# Patient Record
Sex: MALE | Race: WHITE | NOT HISPANIC OR LATINO
[De-identification: names, ages, dates, MRNs, and addresses within clinical notes are randomized per-mention and may not be internally consistent; named-entity substitution may affect disease eponyms.]

---

## 2017-09-20 ENCOUNTER — TRANSCRIPTION ENCOUNTER (OUTPATIENT)
Age: 57
End: 2017-09-20

## 2017-09-20 ENCOUNTER — INPATIENT (INPATIENT)
Facility: HOSPITAL | Age: 57
LOS: 0 days | Discharge: HOME | End: 2017-09-21
Attending: INTERNAL MEDICINE | Admitting: INTERNAL MEDICINE
Payer: MEDICARE

## 2017-09-20 VITALS
HEART RATE: 77 BPM | SYSTOLIC BLOOD PRESSURE: 158 MMHG | TEMPERATURE: 98 F | OXYGEN SATURATION: 98 % | DIASTOLIC BLOOD PRESSURE: 89 MMHG | RESPIRATION RATE: 17 BRPM

## 2017-09-20 DIAGNOSIS — Z95.1 PRESENCE OF AORTOCORONARY BYPASS GRAFT: Chronic | ICD-10-CM

## 2017-09-20 DIAGNOSIS — R94.39 ABNORMAL RESULT OF OTHER CARDIOVASCULAR FUNCTION STUDY: ICD-10-CM

## 2017-09-20 LAB
BUN SERPL-MCNC: 24 MG/DL — HIGH (ref 7–23)
CALCIUM SERPL-MCNC: 9.3 MG/DL — SIGNIFICANT CHANGE UP (ref 8.4–10.5)
CHLORIDE SERPL-SCNC: 97 MMOL/L — LOW (ref 98–107)
CO2 SERPL-SCNC: 23 MMOL/L — SIGNIFICANT CHANGE UP (ref 22–31)
CREAT SERPL-MCNC: 0.99 MG/DL — SIGNIFICANT CHANGE UP (ref 0.5–1.3)
GLUCOSE SERPL-MCNC: 475 MG/DL — CRITICAL HIGH (ref 70–99)
HBA1C BLD-MCNC: 6.8 % — HIGH (ref 4–5.6)
HCT VFR BLD CALC: 45.2 % — SIGNIFICANT CHANGE UP (ref 39–50)
HGB BLD-MCNC: 15.3 G/DL — SIGNIFICANT CHANGE UP (ref 13–17)
MCHC RBC-ENTMCNC: 30.3 PG — SIGNIFICANT CHANGE UP (ref 27–34)
MCHC RBC-ENTMCNC: 33.8 % — SIGNIFICANT CHANGE UP (ref 32–36)
MCV RBC AUTO: 89.5 FL — SIGNIFICANT CHANGE UP (ref 80–100)
NRBC # FLD: 0 — SIGNIFICANT CHANGE UP
PLATELET # BLD AUTO: 244 K/UL — SIGNIFICANT CHANGE UP (ref 150–400)
PMV BLD: 10.7 FL — SIGNIFICANT CHANGE UP (ref 7–13)
POTASSIUM SERPL-MCNC: 5.2 MMOL/L — SIGNIFICANT CHANGE UP (ref 3.5–5.3)
POTASSIUM SERPL-SCNC: 5.2 MMOL/L — SIGNIFICANT CHANGE UP (ref 3.5–5.3)
RBC # BLD: 5.05 M/UL — SIGNIFICANT CHANGE UP (ref 4.2–5.8)
RBC # FLD: 12.5 % — SIGNIFICANT CHANGE UP (ref 10.3–14.5)
SODIUM SERPL-SCNC: 136 MMOL/L — SIGNIFICANT CHANGE UP (ref 135–145)
WBC # BLD: 13.54 K/UL — HIGH (ref 3.8–10.5)
WBC # FLD AUTO: 13.54 K/UL — HIGH (ref 3.8–10.5)

## 2017-09-20 PROCEDURE — 93010 ELECTROCARDIOGRAM REPORT: CPT | Mod: 76

## 2017-09-20 RX ORDER — CLOPIDOGREL BISULFATE 75 MG/1
75 TABLET, FILM COATED ORAL DAILY
Qty: 0 | Refills: 0 | Status: DISCONTINUED | OUTPATIENT
Start: 2017-09-21 | End: 2017-09-21

## 2017-09-20 RX ORDER — CLONAZEPAM 1 MG
0.5 TABLET ORAL DAILY
Qty: 0 | Refills: 0 | Status: DISCONTINUED | OUTPATIENT
Start: 2017-09-20 | End: 2017-09-21

## 2017-09-20 RX ORDER — DEXTROSE 50 % IN WATER 50 %
25 SYRINGE (ML) INTRAVENOUS ONCE
Qty: 0 | Refills: 0 | Status: DISCONTINUED | OUTPATIENT
Start: 2017-09-20 | End: 2017-09-21

## 2017-09-20 RX ORDER — INSULIN LISPRO 100/ML
12 VIAL (ML) SUBCUTANEOUS ONCE
Qty: 0 | Refills: 0 | Status: COMPLETED | OUTPATIENT
Start: 2017-09-20 | End: 2017-09-20

## 2017-09-20 RX ORDER — LISINOPRIL 2.5 MG/1
40 TABLET ORAL
Qty: 0 | Refills: 0 | Status: DISCONTINUED | OUTPATIENT
Start: 2017-09-20 | End: 2017-09-21

## 2017-09-20 RX ORDER — SODIUM CHLORIDE 9 MG/ML
3 INJECTION INTRAMUSCULAR; INTRAVENOUS; SUBCUTANEOUS EVERY 8 HOURS
Qty: 0 | Refills: 0 | Status: DISCONTINUED | OUTPATIENT
Start: 2017-09-20 | End: 2017-09-21

## 2017-09-20 RX ORDER — ATORVASTATIN CALCIUM 80 MG/1
10 TABLET, FILM COATED ORAL AT BEDTIME
Qty: 0 | Refills: 0 | Status: DISCONTINUED | OUTPATIENT
Start: 2017-09-20 | End: 2017-09-21

## 2017-09-20 RX ORDER — INSULIN GLARGINE 100 [IU]/ML
17 INJECTION, SOLUTION SUBCUTANEOUS
Qty: 0 | Refills: 0 | Status: DISCONTINUED | OUTPATIENT
Start: 2017-09-20 | End: 2017-09-21

## 2017-09-20 RX ORDER — INSULIN LISPRO 100/ML
6 VIAL (ML) SUBCUTANEOUS ONCE
Qty: 0 | Refills: 0 | Status: COMPLETED | OUTPATIENT
Start: 2017-09-20 | End: 2017-09-20

## 2017-09-20 RX ORDER — CITALOPRAM 10 MG/1
10 TABLET, FILM COATED ORAL DAILY
Qty: 0 | Refills: 0 | Status: DISCONTINUED | OUTPATIENT
Start: 2017-09-20 | End: 2017-09-21

## 2017-09-20 RX ORDER — INSULIN LISPRO 100/ML
VIAL (ML) SUBCUTANEOUS
Qty: 0 | Refills: 0 | Status: DISCONTINUED | OUTPATIENT
Start: 2017-09-20 | End: 2017-09-21

## 2017-09-20 RX ORDER — GLUCAGON INJECTION, SOLUTION 0.5 MG/.1ML
1 INJECTION, SOLUTION SUBCUTANEOUS ONCE
Qty: 0 | Refills: 0 | Status: DISCONTINUED | OUTPATIENT
Start: 2017-09-20 | End: 2017-09-21

## 2017-09-20 RX ORDER — DEXTROSE 50 % IN WATER 50 %
12.5 SYRINGE (ML) INTRAVENOUS ONCE
Qty: 0 | Refills: 0 | Status: DISCONTINUED | OUTPATIENT
Start: 2017-09-20 | End: 2017-09-21

## 2017-09-20 RX ORDER — METOPROLOL TARTRATE 50 MG
100 TABLET ORAL DAILY
Qty: 0 | Refills: 0 | Status: DISCONTINUED | OUTPATIENT
Start: 2017-09-20 | End: 2017-09-21

## 2017-09-20 RX ORDER — INSULIN GLARGINE 100 [IU]/ML
19 INJECTION, SOLUTION SUBCUTANEOUS AT BEDTIME
Qty: 0 | Refills: 0 | Status: DISCONTINUED | OUTPATIENT
Start: 2017-09-20 | End: 2017-09-21

## 2017-09-20 RX ORDER — ASPIRIN/CALCIUM CARB/MAGNESIUM 324 MG
81 TABLET ORAL ONCE
Qty: 0 | Refills: 0 | Status: COMPLETED | OUTPATIENT
Start: 2017-09-20 | End: 2017-09-20

## 2017-09-20 RX ORDER — DEXTROSE 50 % IN WATER 50 %
1 SYRINGE (ML) INTRAVENOUS ONCE
Qty: 0 | Refills: 0 | Status: DISCONTINUED | OUTPATIENT
Start: 2017-09-20 | End: 2017-09-21

## 2017-09-20 RX ORDER — SODIUM CHLORIDE 9 MG/ML
1000 INJECTION, SOLUTION INTRAVENOUS
Qty: 0 | Refills: 0 | Status: DISCONTINUED | OUTPATIENT
Start: 2017-09-20 | End: 2017-09-21

## 2017-09-20 RX ORDER — NIFEDIPINE 30 MG
30 TABLET, EXTENDED RELEASE 24 HR ORAL DAILY
Qty: 0 | Refills: 0 | Status: DISCONTINUED | OUTPATIENT
Start: 2017-09-20 | End: 2017-09-21

## 2017-09-20 RX ORDER — INSULIN LISPRO 100/ML
5 VIAL (ML) SUBCUTANEOUS
Qty: 0 | Refills: 0 | Status: DISCONTINUED | OUTPATIENT
Start: 2017-09-20 | End: 2017-09-21

## 2017-09-20 RX ORDER — ASPIRIN/CALCIUM CARB/MAGNESIUM 324 MG
81 TABLET ORAL DAILY
Qty: 0 | Refills: 0 | Status: DISCONTINUED | OUTPATIENT
Start: 2017-09-20 | End: 2017-09-21

## 2017-09-20 RX ORDER — SODIUM CHLORIDE 9 MG/ML
500 INJECTION INTRAMUSCULAR; INTRAVENOUS; SUBCUTANEOUS
Qty: 0 | Refills: 0 | Status: COMPLETED | OUTPATIENT
Start: 2017-09-20 | End: 2017-09-20

## 2017-09-20 RX ADMIN — Medication 5 UNIT(S): at 17:49

## 2017-09-20 RX ADMIN — CITALOPRAM 10 MILLIGRAM(S): 10 TABLET, FILM COATED ORAL at 14:12

## 2017-09-20 RX ADMIN — LISINOPRIL 40 MILLIGRAM(S): 2.5 TABLET ORAL at 18:38

## 2017-09-20 RX ADMIN — SODIUM CHLORIDE 75 MILLILITER(S): 9 INJECTION INTRAMUSCULAR; INTRAVENOUS; SUBCUTANEOUS at 15:38

## 2017-09-20 RX ADMIN — SODIUM CHLORIDE 3 MILLILITER(S): 9 INJECTION INTRAMUSCULAR; INTRAVENOUS; SUBCUTANEOUS at 17:12

## 2017-09-20 RX ADMIN — INSULIN GLARGINE 19 UNIT(S): 100 INJECTION, SOLUTION SUBCUTANEOUS at 22:37

## 2017-09-20 RX ADMIN — Medication 81 MILLIGRAM(S): at 08:46

## 2017-09-20 RX ADMIN — Medication 4: at 17:49

## 2017-09-20 RX ADMIN — ATORVASTATIN CALCIUM 10 MILLIGRAM(S): 80 TABLET, FILM COATED ORAL at 22:37

## 2017-09-20 RX ADMIN — Medication 0.5 MILLIGRAM(S): at 14:11

## 2017-09-20 RX ADMIN — Medication 12 UNIT(S): at 08:46

## 2017-09-20 RX ADMIN — SODIUM CHLORIDE 3 MILLILITER(S): 9 INJECTION INTRAMUSCULAR; INTRAVENOUS; SUBCUTANEOUS at 22:38

## 2017-09-20 NOTE — DISCHARGE NOTE ADULT - CARE PLAN
Principal Discharge DX:	CAD (coronary artery disease)  Goal:	symptom resolution s/p cardiac catheterization with stent  Instructions for follow-up, activity and diet:	Low fat, Low cholesterol diet.  Take medications as prescribed. Follow up with PMD within 1 week of discharge.  Secondary Diagnosis:	DM (diabetes mellitus)  Goal:	Blood sugar control  Instructions for follow-up, activity and diet:	Monitor glucose levels daily with fingersticks, continue DM meds as prescribed, ADA diet, Follow up with PMD within 1 week of discharge.  Secondary Diagnosis:	HTN (hypertension)  Goal:	Blood pressure control  Instructions for follow-up, activity and diet:	Low salt, low fat diet.  Take blood pressure medications as prescribed.  Please follow up with your PMD in 1-2 weeks for further medical management  Secondary Diagnosis:	Hyperlipidemia  Goal:	maintain normal cholesterol levels  Instructions for follow-up, activity and diet:	Low fat, Low cholesterol diet.  Take medications as prescribed. Follow up with PMD within 1 week of discharge. Principal Discharge DX:	CAD (coronary artery disease)  Goal:	symptom resolution s/p cardiac catheterization with stent  Instructions for follow-up, activity and diet:	Low fat, Low cholesterol diet.  Take medications as prescribed. Follow up with PMD within 1 week of discharge. Follow up with Dr Price, please call an make an appointment  Secondary Diagnosis:	DM (diabetes mellitus)  Goal:	Blood sugar control  Instructions for follow-up, activity and diet:	Monitor glucose levels daily with fingersticks, continue diabetes medications as prescribed, ADA diet, Follow up with PMD within 1 week of discharge.  Secondary Diagnosis:	HTN (hypertension)  Goal:	Blood pressure control  Instructions for follow-up, activity and diet:	Low salt, low fat diet.  Take blood pressure medications as prescribed.  Please follow up with your PMD in 1-2 weeks for further medical management  Secondary Diagnosis:	Hyperlipidemia  Goal:	maintain normal cholesterol levels  Instructions for follow-up, activity and diet:	Low fat, Low cholesterol diet.  Take medications as prescribed. Follow up with PMD within 1 week of discharge. Principal Discharge DX:	CAD (coronary artery disease)  Goal:	symptom resolution s/p cardiac catheterization with stent  Instructions for follow-up, activity and diet:	Low fat, Low cholesterol diet. You need to take your medications as prescribed every day to prevent the stents in your coronary arteries from becoming blocked (Aspirin and Plavix). Please follow up with your cardiologist within 1 week.  Secondary Diagnosis:	DM (diabetes mellitus)  Goal:	Blood sugar control  Instructions for follow-up, activity and diet:	Monitor glucose levels daily with fingersticks, continue insulin regimen and oral medications as prescribed, to keep A1C <6. ADA diet, Follow up with PMD within 1 week of discharge.  Secondary Diagnosis:	HTN (hypertension)  Goal:	Blood pressure control, keep pressure <140/90  Instructions for follow-up, activity and diet:	Low salt, low fat diet.  Take blood pressure medications as prescribed.  Please follow up with your PMD in 1-2 weeks for further medical management  Secondary Diagnosis:	Hyperlipidemia  Goal:	maintain normal cholesterol levels  Instructions for follow-up, activity and diet:	Low fat, Low cholesterol diet. Take pravastatin as prescribed. Follow up with PMD within 1 week of discharge.

## 2017-09-20 NOTE — DISCHARGE NOTE ADULT - PATIENT PORTAL LINK FT
“You can access the Follow[facility] Patient Portal, offered by [facility], by registering with the following website: [email or web address]”

## 2017-09-20 NOTE — DISCHARGE NOTE ADULT - CARE PROVIDER_API CALL
Nicolas Price), Cardiovascular Disease; Internal Medicine; Nuclear Cardiology  [address] [place] [address]  Phone: [phone]  Fax: [phone]

## 2017-09-20 NOTE — H&P CARDIOLOGY - PSH
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 ago    History of Tonsillectomy    S/P CABG x 3  2014 with Dr Oshea

## 2017-09-20 NOTE — DISCHARGE NOTE ADULT - PLAN OF CARE
symptom resolution s/p cardiac catheterization with stent Low fat, Low cholesterol diet.  Take medications as prescribed. Follow up with PMD within 1 week of discharge. Blood sugar control Monitor glucose levels daily with fingersticks, continue DM meds as prescribed, ADA diet, Follow up with PMD within 1 week of discharge. Blood pressure control Low salt, low fat diet.  Take blood pressure medications as prescribed.  Please follow up with your PMD in 1-2 weeks for further medical management maintain normal cholesterol levels Low fat, Low cholesterol diet.  Take medications as prescribed. Follow up with PMD within 1 week of discharge. Follow up with Dr Price, please call an make an appointment Monitor glucose levels daily with fingersticks, continue diabetes medications as prescribed, ADA diet, Follow up with PMD within 1 week of discharge. Low fat, Low cholesterol diet. You need to take your medications as prescribed every day to prevent the stents in your coronary arteries from becoming blocked (Aspirin and Plavix). Please follow up with your cardiologist within 1 week. Monitor glucose levels daily with fingersticks, continue insulin regimen and oral medications as prescribed, to keep A1C <6. ADA diet, Follow up with PMD within 1 week of discharge. Blood pressure control, keep pressure <140/90 Low fat, Low cholesterol diet. Take pravastatin as prescribed. Follow up with PMD within 1 week of discharge.

## 2017-09-20 NOTE — H&P CARDIOLOGY - ATTENDING COMMENTS
pt seen and examined agree with plan above     1) CAD s/p CABG s/p PCI - s/p PCI of left mid LCX, ASHLEE, no complications, cont asa and plavix, d/c home tomorrow, all three grafts patent     2) DM2 - sugars elevated sec to prednisone, cont sliding scale    3) HTN -resume home meds

## 2017-09-20 NOTE — H&P CARDIOLOGY - FAMILY HISTORY
Father  Still living? No  Family history of acute myocardial infarction, Age at diagnosis: Age Unknown

## 2017-09-20 NOTE — DISCHARGE NOTE ADULT - ADDITIONAL INSTRUCTIONS
No heavy lifting greater than 5-10 pounds with wrist  procedure was performed on for one week after procedure. No strenuous activity x 3 weeks after procedure. Monitor site of procedure for any redness, swelling, discharge, if any of this occurs follow-up with PMD/Cardiologist or return to hospital immediately. No heavy lifting x one week after procedure. No strenuous activity x 3 weeks after procedure. No driving x 24 hours. You may shower but no baths or swimming x one week post procedure.  Monitor site of procedure for any redness, swelling, discharge, if any of this occurs follow-up with PMD/Cardiologist or return to hospital immediately.

## 2017-09-20 NOTE — DISCHARGE NOTE ADULT - MEDICATION SUMMARY - MEDICATIONS TO TAKE
I will START or STAY ON the medications listed below when I get home from the hospital:    Aspir 81 oral delayed release tablet  -- 1 tab(s) by mouth once a day  -- Indication: For CAD (coronary artery disease)    lisinopril 40 mg oral tablet  -- 1 tab(s) by mouth 2 times a day  -- Indication: For HTN (hypertension)    clonazePAM 0.5 mg oral tablet  -- 1 tab(s) by mouth once a day  -- Indication: For Anxiety    citalopram 10 mg oral tablet  -- 1 tab(s) by mouth once a day  -- Indication: For Anxiety    metFORMIN 500 mg oral tablet, extended release  -- 1 tab(s) by mouth once a day  -- Indication: For DM (diabetes mellitus)    HumaLOG 100 units/mL subcutaneous solution  -- 5 unit(s) subcutaneous 3 times a day (with meals) and sliding scale  -- Indication: For DM (diabetes mellitus)    Lantus 100 units/mL subcutaneous solution  -- 17 unit(s) subcutaneous once a day (in the morning)  -- Indication: For DM (diabetes mellitus)    Lantus 100 units/mL subcutaneous solution  -- 19 unit(s) subcutaneous once a day (at bedtime)  -- Indication: For DM (diabetes mellitus)    pravastatin 40 mg oral tablet  -- 1 tab(s) by mouth once a day  -- Indication: For Hyperlipidemia    clopidogrel 75 mg oral tablet  -- 1 tab(s) by mouth once a day  -- Indication: For CAD (coronary artery disease)    metoprolol succinate 100 mg oral tablet, extended release  -- 1 tab(s) by mouth once a day  -- Indication: For CAD (coronary artery disease)    Nifedical XL 30 mg oral tablet, extended release  -- 1 tab(s) by mouth once a day  -- Indication: For HTN (hypertension)    Multiple Vitamins oral capsule  -- 1 cap(s) by mouth once a day  -- Indication: For Nutirion    Vitamin D3  -- orally 2 times a week  -- Indication: For Nutrition    Vitamin C 250 mg oral tablet  -- 1 tab(s) by mouth once a day  -- Indication: For Nutrition

## 2017-09-20 NOTE — DISCHARGE NOTE ADULT - INSTRUCTIONS
Low cholesterol diet. Salt restriction. 1800Kcal diabetic diet with carb restriction. Take medication as prescribed, don't miss medication dose unless instructed by your provider. If you have an onset of chest pains, shortness of breath, dizziness, palpitations or other cardiac/respiratory symptoms please contact your provider or dial 911.

## 2017-09-20 NOTE — H&P CARDIOLOGY - COMMENTS
on arrival likely steroid induced hyperglycemia in the setting of DM-II  on arrival likely steroid induced hyperglycemia in the setting of DM-II as he took prednisone 50mg po [id number] for known idoine allergy (diabetes well controlled HgbA1c 6.8), will give Humalog 12units subcutaneously x1 and monitor FS closely   Will give ASA 81mg po x1 as patient did not take his morning dose today  patient advised of carotid bruit, admits to carotid duplex with PMD recently

## 2017-09-20 NOTE — H&P CARDIOLOGY - HISTORY OF PRESENT ILLNESS
57 year old male with HTN, hyperlipidemia, DM-II, known CAD with 3V CABG 2014 who presented to his Cardiologist for a routine follow up . Admits to increase in fatigue. Underwent a Nuclear Stress test   Admits to heaviness in his legs when walking, relieved with rest. Denies chest pain, dizziness, syncope, edema, orthopnea, PND, decrease in exercise tolerance.   In light of patients CAD, symptoms and abnormal noninvasive test findings there is high suspicion for CAD progression. Patient is now referred to [facility] for a cardiac catheterization with possible PTCA/stent. 57 year old male with HTN, hyperlipidemia, DM-II, known CAD with 3V CABG 2014 who presented to his Cardiologist for a routine follow up . Admits to increase in fatigue. Admits to heaviness in his legs when walking, relieved with rest. Denies chest pain, dizziness, syncope, edema, orthopnea, PND, decrease in exercise tolerance. Underwent a Nuclear Stress test 8/31/17 revealing EF 42%, moderate LV dilatation. , distal anteroseptal wall, apex and inferoapical region with mild jonn-infarct ischemia.   In light of patients CAD, symptoms and abnormal noninvasive test findings there is high suspicion for CAD progression. Patient is now referred to [facility] for a cardiac catheterization with possible PTCA/stent.

## 2017-09-20 NOTE — PATIENT PROFILE ADULT. - VISION (WITH CORRECTIVE LENSES IF THE PATIENT USUALLY WEARS THEM):
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

## 2017-09-20 NOTE — H&P CARDIOLOGY - PMH
Anxiety    BPH (benign prostatic hyperplasia)    CAD (coronary artery disease)    Coronary Atherosclerosis of Native Coronary Artery    DM (Diabetes Mellitus)    HTN (Hypertension)    Hyperlipidemia    Smoker

## 2017-09-20 NOTE — CHART NOTE - NSCHARTNOTEFT_GEN_A_CORE
Pt returned to floor s/p R angio. Right femoral site clean and dry, femoral pulse 2+, no evidence of active bleeding. DP and PT pulses intact.

## 2017-09-20 NOTE — DISCHARGE NOTE ADULT - HOSPITAL COURSE
57 year old male with HTN, hyperlipidemia, DM-II, known CAD with 3V CABG 2014 who presented to his Cardiologist for a routine follow up . Admits to increase in fatigue. Admits to heaviness in his legs when walking, relieved with rest. Denies chest pain, dizziness, syncope, edema, orthopnea, PND, decrease in exercise tolerance. Underwent a Nuclear Stress test 8/31/17 revealing EF 42%, moderate LV dilatation. , distal anteroseptal wall, apex and inferoapical region with mild jonn-infarct ischemia. In light of patients CAD, symptoms and abnormal noninvasive test findings there is high suspicion for CAD progression. Patient is now referred to [facility] for a cardiac catheterization with possible PTCA/stent.     On 9/20 patient underwent a cardiac catheterization via right radial access that revealed 90 % lesion in the mid circumflex, a Resolute Jose drug-eluting stent was placed. 57 year old male with HTN, hyperlipidemia, DM-II, known CAD with 3V CABG 2014 who presented to his Cardiologist for a routine follow up . Admits to increase in fatigue. Admits to heaviness in his legs when walking, relieved with rest. Denies chest pain, dizziness, syncope, edema, orthopnea, PND, decrease in exercise tolerance. Underwent a Nuclear Stress test 8/31/17 revealing EF 42%, moderate LV dilatation. , distal anteroseptal wall, apex and inferoapical region with mild jonn-infarct ischemia. In light of patients CAD, symptoms and abnormal noninvasive test findings there is high suspicion for CAD progression. Patient is now referred to [facility] for a cardiac catheterization with possible PTCA/stent.     On 9/20 patient underwent a cardiac catheterization via right femoral access that revealed 90 % lesion in the mid circumflex, a Resolute Jose drug-eluting stent was placed. 57 year old male with HTN, hyperlipidemia, DM-II, known CAD with 3V CABG 2014 who presented to his Cardiologist for a routine follow up. Admits to increase in fatigue. Admits to heaviness in his legs when walking, relieved with rest. Denies chest pain, dizziness, syncope, edema, orthopnea, PND, decrease in exercise tolerance. Underwent a Nuclear Stress test 8/31/17 revealing EF 42%, moderate LV dilatation. , distal anteroseptal wall, apex and inferoapical region with mild jonn-infarct ischemia. In light of patients CAD, symptoms and abnormal noninvasive test findings there is high suspicion for CAD progression. Patient is now referred to [facility] for a cardiac catheterization with possible PTCA/stent.     On 9/20 patient underwent a cardiac catheterization via right femoral access that revealed 90 % lesion in the mid circumflex, a Resolute Jose drug-eluting stent was placed. FS on admission was 423 likely secondary to prednisone which was given for iodine allergy. A16 is 6.8. 57 year old male with HTN, hyperlipidemia, DM-II, known CAD with 3V CABG 2014 who presented to his Cardiologist for a routine follow up. Admits to increase in fatigue. Admits to heaviness in his legs when walking, relieved with rest. Denies chest pain, dizziness, syncope, edema, orthopnea, PND, decrease in exercise tolerance. Underwent a Nuclear Stress test 8/31/17 revealing EF 42%, moderate LV dilatation. , distal anteroseptal wall, apex and inferoapical region with mild jonn-infarct ischemia. In light of patients CAD, symptoms and abnormal noninvasive test findings there is high suspicion for CAD progression. Patient is now referred to [facility] for a cardiac catheterization with possible PTCA/stent.     On 9/20 patient underwent a cardiac catheterization via right femoral access that revealed 90 % lesion in the mid circumflex, a Resolute Jose drug-eluting stent was placed. FS on admission was 423 likely secondary to prednisone which was given for iodine allergy. A16 is 6.8. Pt to continue with ASA and plavix. Case discussed with Albert Davis, pt is cleared for discharge with outpatient follow up. 57 year old male with HTN, hyperlipidemia, DM-II, known CAD with 3V CABG 2014 who presented to his Cardiologist for a routine follow up. Admits to increase in fatigue. Admits to heaviness in his legs when walking, relieved with rest. Denies chest pain, dizziness, syncope, edema, orthopnea, PND, decrease in exercise tolerance. Underwent a Nuclear Stress test 8/31/17 revealing EF 42%, moderate LV dilatation. , distal anteroseptal wall, apex and inferoapical region with mild jonn-infarct ischemia. In light of patients CAD, symptoms and abnormal noninvasive test findings there is high suspicion for CAD progression. Patient is now referred to [facility] for a cardiac catheterization with possible PTCA/stent.     On 9/20 patient underwent a cardiac catheterization via right femoral access that revealed 90 % lesion in the mid circumflex, a Resolute Jose drug-eluting stent was placed. FS on admission was 423 likely secondary to prednisone which was given for iodine allergy. A16 is 6.8. Pt to continue with ASA and plavix. Labs and vitals reviewed and discussed with Albert Davis, pt is cleared for discharge with outpatient follow up.

## 2017-09-21 VITALS
OXYGEN SATURATION: 98 % | SYSTOLIC BLOOD PRESSURE: 127 MMHG | RESPIRATION RATE: 18 BRPM | DIASTOLIC BLOOD PRESSURE: 63 MMHG | TEMPERATURE: 98 F | HEART RATE: 63 BPM

## 2017-09-21 LAB
BUN SERPL-MCNC: 25 MG/DL — HIGH (ref 7–23)
CALCIUM SERPL-MCNC: 8.8 MG/DL — SIGNIFICANT CHANGE UP (ref 8.4–10.5)
CHLORIDE SERPL-SCNC: 100 MMOL/L — SIGNIFICANT CHANGE UP (ref 98–107)
CO2 SERPL-SCNC: 21 MMOL/L — LOW (ref 22–31)
CREAT SERPL-MCNC: 0.92 MG/DL — SIGNIFICANT CHANGE UP (ref 0.5–1.3)
GLUCOSE SERPL-MCNC: 340 MG/DL — HIGH (ref 70–99)
HCT VFR BLD CALC: 43.3 % — SIGNIFICANT CHANGE UP (ref 39–50)
HGB BLD-MCNC: 15.1 G/DL — SIGNIFICANT CHANGE UP (ref 13–17)
MCHC RBC-ENTMCNC: 31.3 PG — SIGNIFICANT CHANGE UP (ref 27–34)
MCHC RBC-ENTMCNC: 34.9 % — SIGNIFICANT CHANGE UP (ref 32–36)
MCV RBC AUTO: 89.8 FL — SIGNIFICANT CHANGE UP (ref 80–100)
NRBC # FLD: 0 — SIGNIFICANT CHANGE UP
PLATELET # BLD AUTO: 258 K/UL — SIGNIFICANT CHANGE UP (ref 150–400)
PMV BLD: 10.7 FL — SIGNIFICANT CHANGE UP (ref 7–13)
POTASSIUM SERPL-MCNC: 4.4 MMOL/L — SIGNIFICANT CHANGE UP (ref 3.5–5.3)
POTASSIUM SERPL-SCNC: 4.4 MMOL/L — SIGNIFICANT CHANGE UP (ref 3.5–5.3)
RBC # BLD: 4.82 M/UL — SIGNIFICANT CHANGE UP (ref 4.2–5.8)
RBC # FLD: 12.7 % — SIGNIFICANT CHANGE UP (ref 10.3–14.5)
SODIUM SERPL-SCNC: 138 MMOL/L — SIGNIFICANT CHANGE UP (ref 135–145)
WBC # BLD: 22.33 K/UL — HIGH (ref 3.8–10.5)
WBC # FLD AUTO: 22.33 K/UL — HIGH (ref 3.8–10.5)

## 2017-09-21 RX ORDER — ASPIRIN/CALCIUM CARB/MAGNESIUM 324 MG
1 TABLET ORAL
Qty: 90 | Refills: 0 | OUTPATIENT
Start: 2017-09-21 | End: 2017-12-20

## 2017-09-21 RX ORDER — CLOPIDOGREL BISULFATE 75 MG/1
1 TABLET, FILM COATED ORAL
Qty: 90 | Refills: 3
Start: 2017-09-21 | End: 2018-09-15

## 2017-09-21 RX ORDER — ASPIRIN/CALCIUM CARB/MAGNESIUM 324 MG
1 TABLET ORAL
Qty: 0 | Refills: 0 | COMMUNITY

## 2017-09-21 RX ORDER — CLOPIDOGREL BISULFATE 75 MG/1
1 TABLET, FILM COATED ORAL
Qty: 90 | Refills: 3 | OUTPATIENT
Start: 2017-09-21 | End: 2018-09-15

## 2017-09-21 RX ORDER — CLOPIDOGREL BISULFATE 75 MG/1
1 TABLET, FILM COATED ORAL
Qty: 0 | Refills: 0 | COMMUNITY
Start: 2017-09-21

## 2017-09-21 RX ORDER — ASPIRIN/CALCIUM CARB/MAGNESIUM 324 MG
1 TABLET ORAL
Qty: 90 | Refills: 0
Start: 2017-09-21 | End: 2017-12-20

## 2017-09-21 RX ADMIN — Medication 81 MILLIGRAM(S): at 11:41

## 2017-09-21 RX ADMIN — Medication 5 UNIT(S): at 08:39

## 2017-09-21 RX ADMIN — SODIUM CHLORIDE 3 MILLILITER(S): 9 INJECTION INTRAMUSCULAR; INTRAVENOUS; SUBCUTANEOUS at 06:07

## 2017-09-21 RX ADMIN — Medication 5 UNIT(S): at 12:44

## 2017-09-21 RX ADMIN — INSULIN GLARGINE 17 UNIT(S): 100 INJECTION, SOLUTION SUBCUTANEOUS at 08:39

## 2017-09-21 RX ADMIN — CITALOPRAM 10 MILLIGRAM(S): 10 TABLET, FILM COATED ORAL at 11:41

## 2017-09-21 RX ADMIN — Medication 5: at 08:40

## 2017-09-21 RX ADMIN — SODIUM CHLORIDE 3 MILLILITER(S): 9 INJECTION INTRAMUSCULAR; INTRAVENOUS; SUBCUTANEOUS at 14:28

## 2017-09-21 RX ADMIN — Medication 100 MILLIGRAM(S): at 06:07

## 2017-09-21 RX ADMIN — CLOPIDOGREL BISULFATE 75 MILLIGRAM(S): 75 TABLET, FILM COATED ORAL at 11:41

## 2017-09-21 RX ADMIN — Medication 0.5 MILLIGRAM(S): at 11:41

## 2017-09-21 RX ADMIN — Medication 30 MILLIGRAM(S): at 06:07

## 2017-09-21 RX ADMIN — LISINOPRIL 40 MILLIGRAM(S): 2.5 TABLET ORAL at 06:07

## 2017-09-21 NOTE — PROGRESS NOTE ADULT - ASSESSMENT
pt seen and examined agree with plan above     1) CAD s/p CABG s/p PCI - s/p PCI of left mid LCX, ASHLEE, no complications, cont asa and plavix, d/c home today, all three grafts patent     2) DM2 - sugars elevated sec to prednisone, cont sliding scale    3) HTN -resume home meds

## 2017-09-21 NOTE — PROGRESS NOTE ADULT - SUBJECTIVE AND OBJECTIVE BOX
INTERVAL HISTORY: pt seen in am lying in bed comfortable, not in distress, no cp no SOB  	  MEDICATIONS:  aspirin enteric coated 81 milliGRAM(s) Oral daily  lisinopril 40 milliGRAM(s) Oral two times a day  metoprolol succinate  milliGRAM(s) Oral daily  NIFEdipine XL 30 milliGRAM(s) Oral daily  clopidogrel Tablet 75 milliGRAM(s) Oral daily        clonazePAM Tablet 0.5 milliGRAM(s) Oral daily  citalopram 10 milliGRAM(s) Oral daily      insulin lispro (HumaLOG) corrective regimen sliding scale   SubCutaneous three times a day before meals  dextrose Gel 1 Dose(s) Oral once PRN  dextrose 50% Injectable 12.5 Gram(s) IV Push once  dextrose 50% Injectable 25 Gram(s) IV Push once  dextrose 50% Injectable 25 Gram(s) IV Push once  glucagon  Injectable 1 milliGRAM(s) IntraMuscular once PRN  insulin lispro Injectable (HumaLOG) 5 Unit(s) SubCutaneous three times a day with meals  insulin glargine Injectable (LANTUS) 19 Unit(s) SubCutaneous at bedtime  insulin glargine Injectable (LANTUS) 17 Unit(s) SubCutaneous <User Schedule>  atorvastatin 10 milliGRAM(s) Oral at bedtime    dextrose 5%. 1000 milliLiter(s) IV Continuous <Continuous>      PHYSICAL EXAM:  T(C): 36.8 (09-21-17 @ 12:15), Max: 37.1 (09-20-17 @ 22:34)  HR: 63 (09-21-17 @ 12:15) (63 - 77)  BP: 127/63 (09-21-17 @ 12:15) (127/63 - 160/79)  RR: 18 (09-21-17 @ 12:15) (17 - 18)  SpO2: 98% (09-21-17 @ 12:15) (97% - 98%)  Wt(kg): --  I&O's Summary        Appearance: Normal	  HEENT:   Normal oral mucosa, PERRL, EOMI	  Cardiovascular: Normal S1 S2, No JVD, No murmurs, No edema  Respiratory: Lungs clear to auscultation	  Gastrointestinal:  Soft, Non-tender, + BS	  Extremities: Normal range of motion, No clubbing, cyanosis or edema                                    15.1   22.33 )-----------( 258      ( 21 Sep 2017 06:45 )             43.3     09-21    138  |  100  |  25<H>  ----------------------------<  340<H>  4.4   |  21<L>  |  0.92    Ca    8.8      21 Sep 2017 06:45      proBNP:   Lipid Profile:   HgA1c:   TSH:

## 2018-09-28 PROBLEM — I25.10 ATHEROSCLEROTIC HEART DISEASE OF NATIVE CORONARY ARTERY WITHOUT ANGINA PECTORIS: Chronic | Status: ACTIVE | Noted: 2017-09-20

## 2018-09-28 PROBLEM — N40.0 BENIGN PROSTATIC HYPERPLASIA WITHOUT LOWER URINARY TRACT SYMPTOMS: Chronic | Status: ACTIVE | Noted: 2017-09-20

## 2018-09-28 PROBLEM — F17.200 NICOTINE DEPENDENCE, UNSPECIFIED, UNCOMPLICATED: Chronic | Status: ACTIVE | Noted: 2017-09-20

## 2018-09-28 PROBLEM — F41.9 ANXIETY DISORDER, UNSPECIFIED: Chronic | Status: ACTIVE | Noted: 2017-09-20

## 2018-10-02 ENCOUNTER — OUTPATIENT (OUTPATIENT)
Dept: OUTPATIENT SERVICES | Facility: HOSPITAL | Age: 58
LOS: 1 days | Discharge: HOME | End: 2018-10-02

## 2018-10-02 DIAGNOSIS — Z95.1 PRESENCE OF AORTOCORONARY BYPASS GRAFT: Chronic | ICD-10-CM

## 2018-10-02 DIAGNOSIS — I70.718: ICD-10-CM

## 2018-10-02 LAB
BUN SERPL-MCNC: 15 MG/DL — SIGNIFICANT CHANGE UP (ref 7–23)
CALCIUM SERPL-MCNC: 8.7 MG/DL — SIGNIFICANT CHANGE UP (ref 8.4–10.5)
CHLORIDE SERPL-SCNC: 101 MMOL/L — SIGNIFICANT CHANGE UP (ref 98–107)
CO2 SERPL-SCNC: 22 MMOL/L — SIGNIFICANT CHANGE UP (ref 22–31)
CREAT SERPL-MCNC: 0.84 MG/DL — SIGNIFICANT CHANGE UP (ref 0.5–1.3)
GLUCOSE SERPL-MCNC: 265 MG/DL — HIGH (ref 70–99)
HCT VFR BLD CALC: 42.2 % — SIGNIFICANT CHANGE UP (ref 39–50)
HGB BLD-MCNC: 14.2 G/DL — SIGNIFICANT CHANGE UP (ref 13–17)
MCHC RBC-ENTMCNC: 30.7 PG — SIGNIFICANT CHANGE UP (ref 27–34)
MCHC RBC-ENTMCNC: 33.6 % — SIGNIFICANT CHANGE UP (ref 32–36)
MCV RBC AUTO: 91.3 FL — SIGNIFICANT CHANGE UP (ref 80–100)
NRBC # FLD: 0 — SIGNIFICANT CHANGE UP
PLATELET # BLD AUTO: 235 K/UL — SIGNIFICANT CHANGE UP (ref 150–400)
PMV BLD: 10.3 FL — SIGNIFICANT CHANGE UP (ref 7–13)
POTASSIUM SERPL-MCNC: 4.3 MMOL/L — SIGNIFICANT CHANGE UP (ref 3.5–5.3)
POTASSIUM SERPL-SCNC: 4.3 MMOL/L — SIGNIFICANT CHANGE UP (ref 3.5–5.3)
RBC # BLD: 4.62 M/UL — SIGNIFICANT CHANGE UP (ref 4.2–5.8)
RBC # FLD: 12.8 % — SIGNIFICANT CHANGE UP (ref 10.3–14.5)
SODIUM SERPL-SCNC: 137 MMOL/L — SIGNIFICANT CHANGE UP (ref 135–145)
WBC # BLD: 10.46 K/UL — SIGNIFICANT CHANGE UP (ref 3.8–10.5)
WBC # FLD AUTO: 10.46 K/UL — SIGNIFICANT CHANGE UP (ref 3.8–10.5)

## 2018-10-02 RX ORDER — DEXTROSE 50 % IN WATER 50 %
12.5 SYRINGE (ML) INTRAVENOUS ONCE
Qty: 0 | Refills: 0 | Status: DISCONTINUED | OUTPATIENT
Start: 2018-10-02 | End: 2018-10-17

## 2018-10-02 RX ORDER — SODIUM CHLORIDE 9 MG/ML
3 INJECTION INTRAMUSCULAR; INTRAVENOUS; SUBCUTANEOUS EVERY 8 HOURS
Qty: 0 | Refills: 0 | Status: DISCONTINUED | OUTPATIENT
Start: 2018-10-02 | End: 2018-10-17

## 2018-10-02 RX ORDER — INSULIN GLARGINE 100 [IU]/ML
17 INJECTION, SOLUTION SUBCUTANEOUS
Qty: 0 | Refills: 0 | COMMUNITY

## 2018-10-02 RX ORDER — DIPHENHYDRAMINE HCL 50 MG
50 CAPSULE ORAL ONCE
Qty: 0 | Refills: 0 | Status: COMPLETED | OUTPATIENT
Start: 2018-10-02 | End: 2018-10-02

## 2018-10-02 RX ORDER — DEXTROSE 50 % IN WATER 50 %
25 SYRINGE (ML) INTRAVENOUS ONCE
Qty: 0 | Refills: 0 | Status: DISCONTINUED | OUTPATIENT
Start: 2018-10-02 | End: 2018-10-17

## 2018-10-02 RX ORDER — SODIUM CHLORIDE 9 MG/ML
1000 INJECTION, SOLUTION INTRAVENOUS
Qty: 0 | Refills: 0 | Status: DISCONTINUED | OUTPATIENT
Start: 2018-10-02 | End: 2018-10-17

## 2018-10-02 RX ORDER — SODIUM CHLORIDE 9 MG/ML
500 INJECTION INTRAMUSCULAR; INTRAVENOUS; SUBCUTANEOUS ONCE
Qty: 0 | Refills: 0 | Status: DISCONTINUED | OUTPATIENT
Start: 2018-10-02 | End: 2018-10-17

## 2018-10-02 RX ORDER — GLUCAGON INJECTION, SOLUTION 0.5 MG/.1ML
1 INJECTION, SOLUTION SUBCUTANEOUS ONCE
Qty: 0 | Refills: 0 | Status: DISCONTINUED | OUTPATIENT
Start: 2018-10-02 | End: 2018-10-17

## 2018-10-02 RX ORDER — NIFEDIPINE 30 MG
1 TABLET, EXTENDED RELEASE 24 HR ORAL
Qty: 0 | Refills: 0 | COMMUNITY

## 2018-10-02 RX ORDER — INSULIN GLARGINE 100 [IU]/ML
19 INJECTION, SOLUTION SUBCUTANEOUS
Qty: 0 | Refills: 0 | COMMUNITY

## 2018-10-02 RX ORDER — CLONAZEPAM 1 MG
1 TABLET ORAL
Qty: 0 | Refills: 0 | COMMUNITY

## 2018-10-02 RX ORDER — DEXTROSE 50 % IN WATER 50 %
15 SYRINGE (ML) INTRAVENOUS ONCE
Qty: 0 | Refills: 0 | Status: DISCONTINUED | OUTPATIENT
Start: 2018-10-02 | End: 2018-10-17

## 2018-10-02 RX ORDER — METFORMIN HYDROCHLORIDE 850 MG/1
1 TABLET ORAL
Qty: 0 | Refills: 0 | COMMUNITY

## 2018-10-02 RX ORDER — CITALOPRAM 10 MG/1
1 TABLET, FILM COATED ORAL
Qty: 0 | Refills: 0 | COMMUNITY

## 2018-10-02 RX ORDER — ASCORBIC ACID 60 MG
1 TABLET,CHEWABLE ORAL
Qty: 0 | Refills: 0 | COMMUNITY

## 2018-10-02 RX ORDER — INSULIN LISPRO 100/ML
VIAL (ML) SUBCUTANEOUS
Qty: 0 | Refills: 0 | Status: DISCONTINUED | OUTPATIENT
Start: 2018-10-02 | End: 2018-10-17

## 2018-10-02 RX ADMIN — SODIUM CHLORIDE 3 MILLILITER(S): 9 INJECTION INTRAMUSCULAR; INTRAVENOUS; SUBCUTANEOUS at 14:23

## 2018-10-02 RX ADMIN — Medication 50 MILLIGRAM(S): at 14:23

## 2018-10-02 RX ADMIN — Medication 125 MILLIGRAM(S): at 14:22

## 2018-10-02 NOTE — H&P CARDIOLOGY - HISTORY OF PRESENT ILLNESS
59 y/o M M w/ HTN, hyperlipidemia, DM-II, known CAD with 3V CABG 2014 and Smoker presents for bilateral lower extremity angiogram. 59 y/o M w/ HTN, hyperlipidemia, DM-II, known CAD with 3V CABG 2014 and Smoker presents for elective bilateral lower extremity angiogram. Pt states that for the past 6-8 months he has been experiencing bilateral lower extremity claudication when walking. Of note patient has documented IV contrast allergy but was not premedicated with Prednisone. Pt will be given 125mg IV Solu-cortef and 50mg IV Benadryl prior to procedure. Pt denies N/V/D, fevers, chills, cough, palpitations, chest pain, substernal distress, syncope, dyspnea on exertion, orthopnea, nocturnal paroxysmal dyspnea, edema, cyanosis, heart murmurs.

## 2018-10-02 NOTE — H&P CARDIOLOGY - RS GEN PE MLT RESP DETAILS PC
no chest wall tenderness/breath sounds equal/clear to auscultation bilaterally/good air movement/respirations non-labored/airway patent

## 2018-10-02 NOTE — H&P CARDIOLOGY - NEGATIVE CARDIOVASCULAR SYMPTOMS
no paroxysmal nocturnal dyspnea/no peripheral edema/no chest pain/no orthopnea/no palpitations/no dyspnea on exertion

## 2018-10-02 NOTE — H&P CARDIOLOGY - ATTENDING COMMENTS
Pt seen and examined at bedside:    S/P peripheral angio   Lt. % in the mid vessel and reconstitutes at the LVL of adductor canal. AT patent with obstructive ds in the AT.  2 vessel infra POP runoff     Rt. SFA 80% Long segment calcified stenosis 3 vessel runoff,     C/W asa lipitor, will plan to stage anf fix both SFA     500 cc fluid over 2 hrs   D/C home today   F/U as outpatient

## 2018-10-16 ENCOUNTER — TRANSCRIPTION ENCOUNTER (OUTPATIENT)
Age: 58
End: 2018-10-16

## 2018-10-26 ENCOUNTER — OUTPATIENT (OUTPATIENT)
Dept: OUTPATIENT SERVICES | Facility: HOSPITAL | Age: 58
LOS: 1 days | Discharge: HOME | End: 2018-10-26

## 2018-10-26 DIAGNOSIS — I73.9 PERIPHERAL VASCULAR DISEASE, UNSPECIFIED: ICD-10-CM

## 2018-10-26 DIAGNOSIS — Z95.1 PRESENCE OF AORTOCORONARY BYPASS GRAFT: Chronic | ICD-10-CM

## 2018-10-26 LAB
BUN SERPL-MCNC: 11 MG/DL — SIGNIFICANT CHANGE UP (ref 7–23)
CALCIUM SERPL-MCNC: 9 MG/DL — SIGNIFICANT CHANGE UP (ref 8.4–10.5)
CHLORIDE SERPL-SCNC: 104 MMOL/L — SIGNIFICANT CHANGE UP (ref 98–107)
CO2 SERPL-SCNC: 24 MMOL/L — SIGNIFICANT CHANGE UP (ref 22–31)
CREAT SERPL-MCNC: 0.76 MG/DL — SIGNIFICANT CHANGE UP (ref 0.5–1.3)
GLUCOSE SERPL-MCNC: 162 MG/DL — HIGH (ref 70–99)
HCT VFR BLD CALC: 43.3 % — SIGNIFICANT CHANGE UP (ref 39–50)
HGB BLD-MCNC: 14.9 G/DL — SIGNIFICANT CHANGE UP (ref 13–17)
MCHC RBC-ENTMCNC: 31.4 PG — SIGNIFICANT CHANGE UP (ref 27–34)
MCHC RBC-ENTMCNC: 34.4 % — SIGNIFICANT CHANGE UP (ref 32–36)
MCV RBC AUTO: 91.4 FL — SIGNIFICANT CHANGE UP (ref 80–100)
NRBC # FLD: 0 — SIGNIFICANT CHANGE UP
PLATELET # BLD AUTO: 240 K/UL — SIGNIFICANT CHANGE UP (ref 150–400)
PMV BLD: 10.3 FL — SIGNIFICANT CHANGE UP (ref 7–13)
POTASSIUM SERPL-MCNC: 4.2 MMOL/L — SIGNIFICANT CHANGE UP (ref 3.5–5.3)
POTASSIUM SERPL-SCNC: 4.2 MMOL/L — SIGNIFICANT CHANGE UP (ref 3.5–5.3)
RBC # BLD: 4.74 M/UL — SIGNIFICANT CHANGE UP (ref 4.2–5.8)
RBC # FLD: 12.4 % — SIGNIFICANT CHANGE UP (ref 10.3–14.5)
SODIUM SERPL-SCNC: 142 MMOL/L — SIGNIFICANT CHANGE UP (ref 135–145)
WBC # BLD: 11.38 K/UL — HIGH (ref 3.8–10.5)
WBC # FLD AUTO: 11.38 K/UL — HIGH (ref 3.8–10.5)

## 2018-10-26 RX ORDER — SODIUM CHLORIDE 9 MG/ML
3 INJECTION INTRAMUSCULAR; INTRAVENOUS; SUBCUTANEOUS EVERY 8 HOURS
Qty: 0 | Refills: 0 | Status: DISCONTINUED | OUTPATIENT
Start: 2018-10-26 | End: 2018-11-10

## 2018-10-26 RX ORDER — HYDROCORTISONE 20 MG
125 TABLET ORAL ONCE
Qty: 0 | Refills: 0 | Status: DISCONTINUED | OUTPATIENT
Start: 2018-10-26 | End: 2018-10-26

## 2018-10-26 RX ORDER — HYDROCORTISONE 20 MG
100 TABLET ORAL ONCE
Qty: 0 | Refills: 0 | Status: COMPLETED | OUTPATIENT
Start: 2018-10-26 | End: 2018-10-26

## 2018-10-26 RX ORDER — DIPHENHYDRAMINE HCL 50 MG
50 CAPSULE ORAL ONCE
Qty: 0 | Refills: 0 | Status: COMPLETED | OUTPATIENT
Start: 2018-10-26 | End: 2018-10-26

## 2018-10-26 RX ORDER — SODIUM CHLORIDE 9 MG/ML
500 INJECTION INTRAMUSCULAR; INTRAVENOUS; SUBCUTANEOUS
Qty: 0 | Refills: 0 | Status: DISCONTINUED | OUTPATIENT
Start: 2018-10-26 | End: 2018-11-10

## 2018-10-26 RX ADMIN — SODIUM CHLORIDE 3 MILLILITER(S): 9 INJECTION INTRAMUSCULAR; INTRAVENOUS; SUBCUTANEOUS at 13:50

## 2018-10-26 RX ADMIN — Medication 100 MILLIGRAM(S): at 07:52

## 2018-10-26 RX ADMIN — SODIUM CHLORIDE 100 MILLILITER(S): 9 INJECTION INTRAMUSCULAR; INTRAVENOUS; SUBCUTANEOUS at 12:11

## 2018-10-26 RX ADMIN — Medication 50 MILLIGRAM(S): at 08:07

## 2018-10-26 NOTE — H&P CARDIOLOGY - NEGATIVE CARDIOVASCULAR SYMPTOMS
no chest pain/no orthopnea/no peripheral edema/no palpitations/no paroxysmal nocturnal dyspnea/no dyspnea on exertion

## 2018-10-26 NOTE — H&P CARDIOLOGY - HISTORY OF PRESENT ILLNESS
57 y/o M w/ HTN, hyperlipidemia, PAD, DM-II, known CAD with 3V CABG 2014 and Smoker presents for elective left lower extremity angiogram. Pt states that for the past 6-8 months he has been experiencing bilateral lower extremity claudication when walking. Pt found to have severe left SFA disease on 10/2/18 with plan for staged intervention. Of note patient has documented IV contrast allergy but was not premedicated with Prednisone. Pt will be given 125mg IV Solu-cortef and 50mg IV Benadryl prior to procedure. Pt denies N/V/D, fevers, chills, cough, palpitations, chest pain, substernal distress, syncope, dyspnea on exertion, orthopnea, nocturnal paroxysmal dyspnea, edema, cyanosis, heart murmurs.

## 2018-10-26 NOTE — H&P CARDIOLOGY - RS GEN PE MLT RESP DETAILS PC
airway patent/good air movement/clear to auscultation bilaterally/breath sounds equal/respirations non-labored/no chest wall tenderness

## 2018-10-26 NOTE — H&P CARDIOLOGY - ATTENDING COMMENTS
Assessment and Plan:  Pt presented for elective LLE SFA  angioplasty . Continues to have Hernando Class III symptoms of intermittent claudication.   S/P POBA/ Cutting Balloon/ DCB of Lt. SFA   Loaded with asa and Plavix  C/W asa and Plavix  Resume metformin tomorrow   F/u in the clinic Tuesday Oct 30th , for B/L LE Doppler and Plan for Rt. SFA angioplasty

## 2018-10-26 NOTE — H&P CARDIOLOGY - PMH
Anxiety    BPH (benign prostatic hyperplasia)    CAD (coronary artery disease)    Coronary Atherosclerosis of Native Coronary Artery    DM (Diabetes Mellitus)    HTN (Hypertension)    Hyperlipidemia    PAD (peripheral artery disease)    Smoker

## 2019-01-25 ENCOUNTER — OUTPATIENT (OUTPATIENT)
Dept: OUTPATIENT SERVICES | Facility: HOSPITAL | Age: 59
LOS: 1 days | Discharge: HOME | End: 2019-01-25

## 2019-01-25 DIAGNOSIS — Z95.1 PRESENCE OF AORTOCORONARY BYPASS GRAFT: Chronic | ICD-10-CM

## 2019-01-25 PROBLEM — I73.9 PERIPHERAL VASCULAR DISEASE, UNSPECIFIED: Chronic | Status: ACTIVE | Noted: 2018-10-26

## 2019-01-25 LAB
ANION GAP SERPL CALC-SCNC: 12 MMO/L — SIGNIFICANT CHANGE UP (ref 7–14)
BUN SERPL-MCNC: 16 MG/DL — SIGNIFICANT CHANGE UP (ref 7–23)
CALCIUM SERPL-MCNC: 9.6 MG/DL — SIGNIFICANT CHANGE UP (ref 8.4–10.5)
CHLORIDE SERPL-SCNC: 106 MMOL/L — SIGNIFICANT CHANGE UP (ref 98–107)
CO2 SERPL-SCNC: 27 MMOL/L — SIGNIFICANT CHANGE UP (ref 22–31)
CREAT SERPL-MCNC: 0.88 MG/DL — SIGNIFICANT CHANGE UP (ref 0.5–1.3)
GLUCOSE SERPL-MCNC: 66 MG/DL — LOW (ref 70–99)
HCT VFR BLD CALC: 46.4 % — SIGNIFICANT CHANGE UP (ref 39–50)
HGB BLD-MCNC: 15.1 G/DL — SIGNIFICANT CHANGE UP (ref 13–17)
MCHC RBC-ENTMCNC: 30.1 PG — SIGNIFICANT CHANGE UP (ref 27–34)
MCHC RBC-ENTMCNC: 32.5 % — SIGNIFICANT CHANGE UP (ref 32–36)
MCV RBC AUTO: 92.6 FL — SIGNIFICANT CHANGE UP (ref 80–100)
NRBC # FLD: 0 K/UL — LOW (ref 25–125)
PLATELET # BLD AUTO: 251 K/UL — SIGNIFICANT CHANGE UP (ref 150–400)
PMV BLD: 10.2 FL — SIGNIFICANT CHANGE UP (ref 7–13)
POTASSIUM SERPL-MCNC: 4.3 MMOL/L — SIGNIFICANT CHANGE UP (ref 3.5–5.3)
POTASSIUM SERPL-SCNC: 4.3 MMOL/L — SIGNIFICANT CHANGE UP (ref 3.5–5.3)
RBC # BLD: 5.01 M/UL — SIGNIFICANT CHANGE UP (ref 4.2–5.8)
RBC # FLD: 12.5 % — SIGNIFICANT CHANGE UP (ref 10.3–14.5)
SODIUM SERPL-SCNC: 145 MMOL/L — SIGNIFICANT CHANGE UP (ref 135–145)
WBC # BLD: 12.21 K/UL — HIGH (ref 3.8–10.5)
WBC # FLD AUTO: 12.21 K/UL — HIGH (ref 3.8–10.5)

## 2019-01-25 RX ORDER — DEXTROSE 50 % IN WATER 50 %
12.5 SYRINGE (ML) INTRAVENOUS ONCE
Qty: 0 | Refills: 0 | Status: DISCONTINUED | OUTPATIENT
Start: 2019-01-25 | End: 2019-02-09

## 2019-01-25 RX ORDER — SODIUM CHLORIDE 9 MG/ML
3 INJECTION INTRAMUSCULAR; INTRAVENOUS; SUBCUTANEOUS EVERY 8 HOURS
Qty: 0 | Refills: 0 | Status: DISCONTINUED | OUTPATIENT
Start: 2019-01-25 | End: 2019-02-09

## 2019-01-25 RX ORDER — INSULIN LISPRO 100/ML
VIAL (ML) SUBCUTANEOUS
Qty: 0 | Refills: 0 | Status: DISCONTINUED | OUTPATIENT
Start: 2019-01-25 | End: 2019-02-09

## 2019-01-25 RX ORDER — SODIUM CHLORIDE 9 MG/ML
500 INJECTION INTRAMUSCULAR; INTRAVENOUS; SUBCUTANEOUS
Qty: 0 | Refills: 0 | Status: DISCONTINUED | OUTPATIENT
Start: 2019-01-25 | End: 2019-02-09

## 2019-01-25 RX ORDER — DEXTROSE 50 % IN WATER 50 %
25 SYRINGE (ML) INTRAVENOUS ONCE
Qty: 0 | Refills: 0 | Status: DISCONTINUED | OUTPATIENT
Start: 2019-01-25 | End: 2019-02-09

## 2019-01-25 RX ORDER — DEXTROSE 50 % IN WATER 50 %
15 SYRINGE (ML) INTRAVENOUS ONCE
Qty: 0 | Refills: 0 | Status: DISCONTINUED | OUTPATIENT
Start: 2019-01-25 | End: 2019-02-09

## 2019-01-25 RX ORDER — SODIUM CHLORIDE 9 MG/ML
1000 INJECTION, SOLUTION INTRAVENOUS
Qty: 0 | Refills: 0 | Status: DISCONTINUED | OUTPATIENT
Start: 2019-01-25 | End: 2019-02-09

## 2019-01-25 RX ORDER — DEXTROSE 50 % IN WATER 50 %
25 SYRINGE (ML) INTRAVENOUS ONCE
Qty: 0 | Refills: 0 | Status: COMPLETED | OUTPATIENT
Start: 2019-01-25 | End: 2019-01-25

## 2019-01-25 RX ORDER — AMLODIPINE BESYLATE 2.5 MG/1
1 TABLET ORAL
Qty: 0 | Refills: 0 | COMMUNITY

## 2019-01-25 RX ORDER — GLUCAGON INJECTION, SOLUTION 0.5 MG/.1ML
1 INJECTION, SOLUTION SUBCUTANEOUS ONCE
Qty: 0 | Refills: 0 | Status: DISCONTINUED | OUTPATIENT
Start: 2019-01-25 | End: 2019-02-09

## 2019-01-25 RX ORDER — DIPHENHYDRAMINE HCL 50 MG
50 CAPSULE ORAL ONCE
Qty: 0 | Refills: 0 | Status: COMPLETED | OUTPATIENT
Start: 2019-01-25 | End: 2019-01-25

## 2019-01-25 RX ORDER — ISOSORBIDE MONONITRATE 60 MG/1
1 TABLET, EXTENDED RELEASE ORAL
Qty: 0 | Refills: 0 | COMMUNITY

## 2019-01-25 RX ORDER — HYDROCORTISONE 20 MG
100 TABLET ORAL ONCE
Qty: 0 | Refills: 0 | Status: COMPLETED | OUTPATIENT
Start: 2019-01-25 | End: 2019-01-25

## 2019-01-25 RX ADMIN — SODIUM CHLORIDE 100 MILLILITER(S): 9 INJECTION INTRAMUSCULAR; INTRAVENOUS; SUBCUTANEOUS at 12:39

## 2019-01-25 RX ADMIN — Medication 50 MILLIGRAM(S): at 07:44

## 2019-01-25 RX ADMIN — Medication 25 GRAM(S): at 07:37

## 2019-01-25 RX ADMIN — Medication 100 MILLIGRAM(S): at 07:55

## 2019-01-25 NOTE — H&P CARDIOLOGY - HISTORY OF PRESENT ILLNESS
59 y/o M w/ PMH of HTN, hyperlipidemia, PAD, DM-II, known CAD with 3V CABG 2014 and Smoker presents for right lower extremity angiogram. Pt was here in October and had an angioplasty to the left distal SFA. Pt was found to have severe right lower extremity SFA disease in October and is returning for a planned intervention. 59 y/o M w/ PMH of HTN, hyperlipidemia, PAD, DM-II, known CAD with 3V CABG 2014 and Smoker presents for right lower extremity angiogram. Pt was here in October and had an angioplasty to the left distal SFA. Pt was found to have severe right lower extremity SFA disease in October and is returning for a planned intervention. Pt states that his right leg claudication has improved but his right leg still feels heavy on exertion. Pt will be given 100mg IV Solu-cortef and 50mg IV Benadryl prior to procedure. Pt denies N/V/D, fevers, chills, cough, palpitations, chest pain, substernal distress, syncope, dyspnea on exertion, orthopnea, nocturnal paroxysmal dyspnea, edema, cyanosis, heart murmurs.

## 2019-01-25 NOTE — H&P CARDIOLOGY - NEGATIVE CARDIOVASCULAR SYMPTOMS
no palpitations/no chest pain/no dyspnea on exertion/no orthopnea/no paroxysmal nocturnal dyspnea/no peripheral edema

## 2019-01-25 NOTE — H&P CARDIOLOGY - ATTENDING COMMENTS
Pt seen and examined at bedside; agree with above assessment     Assessment and Plan     S/P B/L LE angiography, LT SFA patent   Rt SFA angioplasty and Zilver ASHLEE x 2   C/W  Asa / plavix   Counseled regarding smoking cessation   Mynx device   D/C later today

## 2021-02-02 ENCOUNTER — TRANSCRIPTION ENCOUNTER (OUTPATIENT)
Age: 61
End: 2021-02-02

## 2021-08-08 ENCOUNTER — INPATIENT (INPATIENT)
Facility: HOSPITAL | Age: 61
LOS: 2 days | Discharge: ANOTHER HOSPITAL | DRG: 305 | End: 2021-08-11
Attending: INTERNAL MEDICINE | Admitting: INTERNAL MEDICINE
Payer: COMMERCIAL

## 2021-08-08 VITALS
RESPIRATION RATE: 26 BRPM | HEART RATE: 124 BPM | DIASTOLIC BLOOD PRESSURE: 70 MMHG | HEIGHT: 69 IN | TEMPERATURE: 98 F | WEIGHT: 175.05 LBS | SYSTOLIC BLOOD PRESSURE: 120 MMHG | OXYGEN SATURATION: 93 %

## 2021-08-08 DIAGNOSIS — J96.01 ACUTE RESPIRATORY FAILURE WITH HYPOXIA: ICD-10-CM

## 2021-08-08 DIAGNOSIS — J18.9 PNEUMONIA, UNSPECIFIED ORGANISM: ICD-10-CM

## 2021-08-08 DIAGNOSIS — E78.5 HYPERLIPIDEMIA, UNSPECIFIED: ICD-10-CM

## 2021-08-08 DIAGNOSIS — I25.10 ATHEROSCLEROTIC HEART DISEASE OF NATIVE CORONARY ARTERY WITHOUT ANGINA PECTORIS: ICD-10-CM

## 2021-08-08 DIAGNOSIS — I10 ESSENTIAL (PRIMARY) HYPERTENSION: ICD-10-CM

## 2021-08-08 DIAGNOSIS — Z29.9 ENCOUNTER FOR PROPHYLACTIC MEASURES, UNSPECIFIED: ICD-10-CM

## 2021-08-08 DIAGNOSIS — Z95.1 PRESENCE OF AORTOCORONARY BYPASS GRAFT: Chronic | ICD-10-CM

## 2021-08-08 DIAGNOSIS — E11.9 TYPE 2 DIABETES MELLITUS WITHOUT COMPLICATIONS: ICD-10-CM

## 2021-08-08 LAB
ALBUMIN SERPL ELPH-MCNC: 3.6 G/DL — SIGNIFICANT CHANGE UP (ref 3.5–5)
ALP SERPL-CCNC: 84 U/L — SIGNIFICANT CHANGE UP (ref 40–120)
ALT FLD-CCNC: 44 U/L DA — SIGNIFICANT CHANGE UP (ref 10–60)
ANION GAP SERPL CALC-SCNC: 8 MMOL/L — SIGNIFICANT CHANGE UP (ref 5–17)
AST SERPL-CCNC: 38 U/L — SIGNIFICANT CHANGE UP (ref 10–40)
BASOPHILS # BLD AUTO: 0 K/UL — SIGNIFICANT CHANGE UP (ref 0–0.2)
BASOPHILS NFR BLD AUTO: 0 % — SIGNIFICANT CHANGE UP (ref 0–2)
BILIRUB SERPL-MCNC: 0.6 MG/DL — SIGNIFICANT CHANGE UP (ref 0.2–1.2)
BUN SERPL-MCNC: 20 MG/DL — HIGH (ref 7–18)
CALCIUM SERPL-MCNC: 9.9 MG/DL — SIGNIFICANT CHANGE UP (ref 8.4–10.5)
CHLORIDE SERPL-SCNC: 105 MMOL/L — SIGNIFICANT CHANGE UP (ref 96–108)
CO2 SERPL-SCNC: 25 MMOL/L — SIGNIFICANT CHANGE UP (ref 22–31)
CREAT SERPL-MCNC: 1.03 MG/DL — SIGNIFICANT CHANGE UP (ref 0.5–1.3)
D DIMER BLD IA.RAPID-MCNC: 288 NG/ML DDU — HIGH
EOSINOPHIL # BLD AUTO: 0.15 K/UL — SIGNIFICANT CHANGE UP (ref 0–0.5)
EOSINOPHIL NFR BLD AUTO: 1 % — SIGNIFICANT CHANGE UP (ref 0–6)
ETHANOL SERPL-MCNC: <3 MG/DL — SIGNIFICANT CHANGE UP (ref 0–10)
GLUCOSE SERPL-MCNC: 300 MG/DL — HIGH (ref 70–99)
HCT VFR BLD CALC: 44.5 % — SIGNIFICANT CHANGE UP (ref 39–50)
HGB BLD-MCNC: 15 G/DL — SIGNIFICANT CHANGE UP (ref 13–17)
HYPOSEGMENTATION: PRESENT — SIGNIFICANT CHANGE UP
LYMPHOCYTES # BLD AUTO: 20 % — SIGNIFICANT CHANGE UP (ref 13–44)
LYMPHOCYTES # BLD AUTO: 3.06 K/UL — SIGNIFICANT CHANGE UP (ref 1–3.3)
MANUAL SMEAR VERIFICATION: SIGNIFICANT CHANGE UP
MCHC RBC-ENTMCNC: 31.2 PG — SIGNIFICANT CHANGE UP (ref 27–34)
MCHC RBC-ENTMCNC: 33.7 GM/DL — SIGNIFICANT CHANGE UP (ref 32–36)
MCV RBC AUTO: 92.5 FL — SIGNIFICANT CHANGE UP (ref 80–100)
METAMYELOCYTES # FLD: 1 % — HIGH (ref 0–0)
MONOCYTES # BLD AUTO: 0.61 K/UL — SIGNIFICANT CHANGE UP (ref 0–0.9)
MONOCYTES NFR BLD AUTO: 4 % — SIGNIFICANT CHANGE UP (ref 2–14)
MYELOCYTES NFR BLD: 1 % — HIGH (ref 0–0)
NEUTROPHILS # BLD AUTO: 10.26 K/UL — HIGH (ref 1.8–7.4)
NEUTROPHILS NFR BLD AUTO: 66 % — SIGNIFICANT CHANGE UP (ref 43–77)
NEUTS BAND # BLD: 1 % — SIGNIFICANT CHANGE UP (ref 0–8)
NEUTS VAC BLD QL SMEAR: SLIGHT — SIGNIFICANT CHANGE UP
NRBC # BLD: 0 /100 — SIGNIFICANT CHANGE UP (ref 0–0)
NT-PROBNP SERPL-SCNC: 3195 PG/ML — HIGH (ref 0–125)
PLAT MORPH BLD: NORMAL — SIGNIFICANT CHANGE UP
PLATELET # BLD AUTO: 251 K/UL — SIGNIFICANT CHANGE UP (ref 150–400)
POTASSIUM SERPL-MCNC: 4.5 MMOL/L — SIGNIFICANT CHANGE UP (ref 3.5–5.3)
POTASSIUM SERPL-SCNC: 4.5 MMOL/L — SIGNIFICANT CHANGE UP (ref 3.5–5.3)
PROT SERPL-MCNC: 7.1 G/DL — SIGNIFICANT CHANGE UP (ref 6–8.3)
RBC # BLD: 4.81 M/UL — SIGNIFICANT CHANGE UP (ref 4.2–5.8)
RBC # FLD: 12.7 % — SIGNIFICANT CHANGE UP (ref 10.3–14.5)
RBC BLD AUTO: NORMAL — SIGNIFICANT CHANGE UP
SARS-COV-2 RNA SPEC QL NAA+PROBE: SIGNIFICANT CHANGE UP
SODIUM SERPL-SCNC: 138 MMOL/L — SIGNIFICANT CHANGE UP (ref 135–145)
TOXIC GRANULES BLD QL SMEAR: PRESENT — SIGNIFICANT CHANGE UP
TROPONIN I SERPL-MCNC: 0.97 NG/ML — HIGH (ref 0–0.04)
TROPONIN I SERPL-MCNC: 2.04 NG/ML — HIGH (ref 0–0.04)
TROPONIN I SERPL-MCNC: 3.59 NG/ML — HIGH (ref 0–0.04)
VARIANT LYMPHS # BLD: 6 % — SIGNIFICANT CHANGE UP (ref 0–6)
WBC # BLD: 15.31 K/UL — HIGH (ref 3.8–10.5)
WBC # FLD AUTO: 15.31 K/UL — HIGH (ref 3.8–10.5)

## 2021-08-08 PROCEDURE — 93010 ELECTROCARDIOGRAM REPORT: CPT | Mod: 76

## 2021-08-08 PROCEDURE — 99291 CRITICAL CARE FIRST HOUR: CPT | Mod: CS

## 2021-08-08 RX ORDER — IPRATROPIUM/ALBUTEROL SULFATE 18-103MCG
3 AEROSOL WITH ADAPTER (GRAM) INHALATION
Refills: 0 | Status: DISCONTINUED | OUTPATIENT
Start: 2021-08-08 | End: 2021-08-09

## 2021-08-08 RX ORDER — CLOPIDOGREL BISULFATE 75 MG/1
75 TABLET, FILM COATED ORAL DAILY
Refills: 0 | Status: DISCONTINUED | OUTPATIENT
Start: 2021-08-08 | End: 2021-08-09

## 2021-08-08 RX ORDER — BUDESONIDE AND FORMOTEROL FUMARATE DIHYDRATE 160; 4.5 UG/1; UG/1
2 AEROSOL RESPIRATORY (INHALATION)
Refills: 0 | Status: DISCONTINUED | OUTPATIENT
Start: 2021-08-08 | End: 2021-08-11

## 2021-08-08 RX ORDER — AZITHROMYCIN 500 MG/1
500 TABLET, FILM COATED ORAL EVERY 24 HOURS
Refills: 0 | Status: DISCONTINUED | OUTPATIENT
Start: 2021-08-08 | End: 2021-08-09

## 2021-08-08 RX ORDER — FUROSEMIDE 40 MG
40 TABLET ORAL ONCE
Refills: 0 | Status: COMPLETED | OUTPATIENT
Start: 2021-08-08 | End: 2021-08-08

## 2021-08-08 RX ORDER — METOPROLOL TARTRATE 50 MG
50 TABLET ORAL
Refills: 0 | Status: DISCONTINUED | OUTPATIENT
Start: 2021-08-08 | End: 2021-08-09

## 2021-08-08 RX ORDER — AZITHROMYCIN 500 MG/1
500 TABLET, FILM COATED ORAL ONCE
Refills: 0 | Status: COMPLETED | OUTPATIENT
Start: 2021-08-08 | End: 2021-08-08

## 2021-08-08 RX ORDER — ENOXAPARIN SODIUM 100 MG/ML
100 INJECTION SUBCUTANEOUS
Refills: 0 | Status: DISCONTINUED | OUTPATIENT
Start: 2021-08-08 | End: 2021-08-08

## 2021-08-08 RX ORDER — ASPIRIN/CALCIUM CARB/MAGNESIUM 324 MG
162 TABLET ORAL ONCE
Refills: 0 | Status: COMPLETED | OUTPATIENT
Start: 2021-08-08 | End: 2021-08-08

## 2021-08-08 RX ORDER — ENOXAPARIN SODIUM 100 MG/ML
100 INJECTION SUBCUTANEOUS ONCE
Refills: 0 | Status: COMPLETED | OUTPATIENT
Start: 2021-08-08 | End: 2021-08-08

## 2021-08-08 RX ORDER — LISINOPRIL 2.5 MG/1
40 TABLET ORAL DAILY
Refills: 0 | Status: DISCONTINUED | OUTPATIENT
Start: 2021-08-08 | End: 2021-08-11

## 2021-08-08 RX ORDER — ASPIRIN/CALCIUM CARB/MAGNESIUM 324 MG
81 TABLET ORAL DAILY
Refills: 0 | Status: DISCONTINUED | OUTPATIENT
Start: 2021-08-08 | End: 2021-08-11

## 2021-08-08 RX ORDER — INSULIN LISPRO 100/ML
VIAL (ML) SUBCUTANEOUS
Refills: 0 | Status: DISCONTINUED | OUTPATIENT
Start: 2021-08-08 | End: 2021-08-11

## 2021-08-08 RX ORDER — CEFTRIAXONE 500 MG/1
1000 INJECTION, POWDER, FOR SOLUTION INTRAMUSCULAR; INTRAVENOUS EVERY 24 HOURS
Refills: 0 | Status: DISCONTINUED | OUTPATIENT
Start: 2021-08-08 | End: 2021-08-11

## 2021-08-08 RX ORDER — ENOXAPARIN SODIUM 100 MG/ML
80 INJECTION SUBCUTANEOUS
Refills: 0 | Status: DISCONTINUED | OUTPATIENT
Start: 2021-08-08 | End: 2021-08-09

## 2021-08-08 RX ORDER — CEFTRIAXONE 500 MG/1
1000 INJECTION, POWDER, FOR SOLUTION INTRAMUSCULAR; INTRAVENOUS ONCE
Refills: 0 | Status: COMPLETED | OUTPATIENT
Start: 2021-08-08 | End: 2021-08-08

## 2021-08-08 RX ORDER — ALBUTEROL 90 UG/1
2 AEROSOL, METERED ORAL EVERY 6 HOURS
Refills: 0 | Status: DISCONTINUED | OUTPATIENT
Start: 2021-08-08 | End: 2021-08-11

## 2021-08-08 RX ADMIN — ENOXAPARIN SODIUM 80 MILLIGRAM(S): 100 INJECTION SUBCUTANEOUS at 20:11

## 2021-08-08 RX ADMIN — ENOXAPARIN SODIUM 100 MILLIGRAM(S): 100 INJECTION SUBCUTANEOUS at 10:48

## 2021-08-08 RX ADMIN — CEFTRIAXONE 100 MILLIGRAM(S): 500 INJECTION, POWDER, FOR SOLUTION INTRAMUSCULAR; INTRAVENOUS at 08:02

## 2021-08-08 RX ADMIN — Medication 40 MILLIGRAM(S): at 20:53

## 2021-08-08 RX ADMIN — Medication 162 MILLIGRAM(S): at 08:02

## 2021-08-08 RX ADMIN — AZITHROMYCIN 255 MILLIGRAM(S): 500 TABLET, FILM COATED ORAL at 08:10

## 2021-08-08 RX ADMIN — Medication 3 MILLILITER(S): at 14:03

## 2021-08-08 RX ADMIN — BUDESONIDE AND FORMOTEROL FUMARATE DIHYDRATE 2 PUFF(S): 160; 4.5 AEROSOL RESPIRATORY (INHALATION) at 22:07

## 2021-08-08 RX ADMIN — Medication 10: at 22:06

## 2021-08-08 NOTE — H&P ADULT - PROBLEM SELECTOR PLAN 4
Pt has hx of DM, takes Humalog as per sliding scale premeals and Toujeo as per sliding scale at bedtime Pt has hx of HLD, takes Pravastatin at home  C/w Atorvastatin 40 mg in patient

## 2021-08-08 NOTE — H&P ADULT - PROBLEM SELECTOR PLAN 1
Pt p/w AHRF    Dr Huerta consulted  Trop Pt p/w AHRF, saturating 87% RA--> was on 3 L NC now on 6 L NRB, with elevated BNP on labs  -Clinically, pt has decreased breathe sounds bilaterally, no crackles or lower extremity swelling  -Cxray showing RLL opacity, s/p 1 dose of Azithromycin and Rocephin, continuing with IV Rocephin and Azithromycin  -S/p 1 dose of IV Lasix given elevated BNP, interstitial pulm edema noted on CT Angio  -Trop elevated from 0.973-> 2.040, EKG no ischemic changes. CT Angio no pulm embolus  -Trend troponins, continue with full dose Lovenox 80 mg BID  -F/u Transthoracic echo, blood cultures, procalcitonin  Dr. Huerta consulted

## 2021-08-08 NOTE — H&P ADULT - PROBLEM SELECTOR PLAN 2
Pt has hx of HTN, takes Isosorbide mononitrate 60 mg daily, Metoprolol succinate 100 mg daily, Amlodipine 10 mg daily, Lisinopril 40 mg  Resumed Lopressor 50 mg BID, Lisinopril 40 mg BID, holding Isosorbide mononitrate 60 mg and Amlodipine 10 mg given soft BP 120s  Resume medications as necessary  Hold medications with parameter

## 2021-08-08 NOTE — H&P ADULT - PROBLEM SELECTOR PLAN 3
Pt has hx of HLD, takes Pravastatin at home  C/w Atorvastatin 40 mg in patient Pt has hx of CABG, takes ASA, Statin and Plavix daily  C/w home medications

## 2021-08-08 NOTE — ED ADULT TRIAGE NOTE - CHIEF COMPLAINT QUOTE
Patient states " I woke up I can't breath and pressure on my chest "  patient admits to having couple of beers and smoke last night.  per EMS 87% RA to 94% after combivent neb. X3  and 3liters/nasal

## 2021-08-08 NOTE — H&P ADULT - ASSESSMENT
62 y/o Male with medical hx significant for HTN, HLD, DM, CAD with triple vessel bypass, and active smoker presenting to the ED due to shortness of breath since early this morning, admitted for Acute Hypoxic Resp Failure 2/2 Pulm edema vs ?COPD exacerbation due Pneumonia 60 y/o Male with medical hx significant for HTN, HLD, DM, CAD with triple vessel bypass, and active smoker presenting to the ED due to shortness of breath since early this morning, admitted for Acute Hypoxic Resp Failure 2/2 Pulm edema vs underlying Pneumonia

## 2021-08-08 NOTE — ED PROVIDER NOTE - PROGRESS NOTE DETAILS
canas: cxr possible right lower lobe pna. pt sat 995 3 L NC. pt currently asx. pending labs but will cover with azithsheila and rocephin and admit. s/o to day team Ayanna- not able to lie flat during CTA. placed on NRB with improvement of sats. CTA done now with no evidence of PE. Stable with improved vitals. Will admit for PNA with desat.

## 2021-08-08 NOTE — ED PROVIDER NOTE - CLINICAL SUMMARY MEDICAL DECISION MAKING FREE TEXT BOX
61 yr old male with hx of HTN, HLD, DM, CABG and active smoker presents to ed c/o sob and substernal cp pta. pt was sleeping and woke up with it. no n/v, no fever, no cough, no diaphoresis, no drug use. pt did drink and smoke cigarettes throughout the day but last intake 10p. no syncope, no abd pain or dysuria    cp and sob high cardiac risk r/o pe vs acs vs pna (smoker)- pt without wheezing unlikely copd exacerbation. labs, cxr, ekg, cardiac monitor, asa, covid swab, admit

## 2021-08-08 NOTE — ED PROVIDER NOTE - NEUROLOGICAL, MLM
Alert and oriented, no focal deficits, no motor or sensory deficits.
You can access the FollowMyHealth Patient Portal offered by [facility] by registering at the following website: [email or web address]. By joining [employer]’s FollowMyHealth portal, you will also be able to view your health information using other applications (apps) compatible with our system.

## 2021-08-08 NOTE — PROGRESS NOTE ADULT - ASSESSMENT
seen and examined  brought in sob  worsening from 3-4 days   was able to walk 2 blocks  used to get tired and sob.  no cp  had cabg in 2012   and being followed up by cardiologist periodically  and recently was okayed    with sob pt had lt sided mild tightness of chest but non radiating  no palp, no sob, no sweating   no back pain  dm, htn, hld, cabg  vsstable   on VM  comfortable not in any distress  Sat over 90  lung a/e slightly decreased b/l   no added sounds  heart abd benign  cns alert awake non focal             15.0   15.31 )-----------( 251      ( 08 Aug 2021 07:21 )             44.5     08-08    138  |  105  |  20<H>  ----------------------------<  300<H>  4.5   |  25  |  1.03  trop 2.1    a/p sob possible mixed of CAD/ CHF AND ?COPD   COVID NEG  AND PT HAD NO EXPOSURE TO COVID POS PERSON/PATIENT  CXR MILD FAILURE    SOB  chf/ CAD / COPD    LASIX, BRONCHODILATERS.  CARDIOLOGY, PULMONARY  2 D ECHO  lovenox or iv heparin  presently pain free if recures  or Trop going very high call ICU

## 2021-08-08 NOTE — H&P ADULT - NSHPPHYSICALEXAM_GEN_ALL_CORE
PHYSICAL EXAM:  GENERAL: Middle aged man sitting upright, using NRB, mildly tachypneic  HEAD:  Atraumatic, Normocephalic  EYES: EOMI, PERRLA, conjunctiva and sclera clear  NECK: Supple, No JVD  CHEST/LUNG: Decreased breath sounds bilateral bases, no wheezing or crackles  HEART: Regular rate and rhythm; S1+ S2+  ABDOMEN: Soft, Nontender, Nondistended; Bowel sounds present  EXTREMITIES:, No clubbing, cyanosis, or edema  NEUROLOGY: AAOx3 non-focal  SKIN: No rashes or lesions

## 2021-08-08 NOTE — H&P ADULT - PROBLEM SELECTOR PLAN 5
Pt has hx of DM, takes Humalog as per sliding scale premeals and Toujeo as per sliding scale at bedtime  Started on moderate dose sliding scale  Monitor blood sugars

## 2021-08-08 NOTE — ED PROVIDER NOTE - CARE PLAN
Principal Discharge DX:	Pneumonia of right lung due to infectious organism, unspecified part of lung

## 2021-08-08 NOTE — ED PROVIDER NOTE - OBJECTIVE STATEMENT
61 yr old male with hx of HTN, HLD, DM, CABG and active smoker presents to ed c/o sob and substernal cp pta. pt was sleeping and woke up with it. no n/v, no fever, no cough, no diaphoresis, no drug use. pt did drink and smoke cigarettes throughout the day but last intake 10p. no syncope, no abd pain or dysuria

## 2021-08-08 NOTE — H&P ADULT - HISTORY OF PRESENT ILLNESS
Patient is a 60 y/o Male with medical hx significant for HTN, HLD, DM, CAD with triple vessel bypass, and active smoker presenting to the ED due to shortness of breath since early this morning. He had an episode of shortness of breath on Tuesday morning, pt and pt's wife attributed it to pt's hx of Anxiety. However, today morning, he woke up around 4 am saying "I can't breath" prompting ED admission. Associated with cough and minimal sputum production  Positive cardiac hx in pt's father who passed away due to MI.    In the ED, pt's Cxray showed evidence of increased markings in RLL  CT Angio showing moderate emphysema, moderate pulm HTN   No pulm embolus   Trop 0.973-> 2.040, no ischemic changes  S/p Full dose Lovenox Patient is a 60 y/o Male with medical hx significant for HTN, HLD, DM, CAD with triple vessel bypass, PAD s/o Right LE stent, and Active smoker presenting to the ED due to shortness of breath since early this morning. He had an episode of shortness of breath on Tuesday morning, pt and pt's wife attributed it to pt's hx of Anxiety. However, today morning, he woke up around 4 am saying "I can't breath" prompting ED admission. Pt also associated with cough and minimal sputum production (clear or white in color, not blood tinged or streaked). Pt complains of chest tightness, not chest pain. Pt and pt's wife attended a gathering with about 30 people in close proximity yesterday, both pt and pt's wife are vaccinated. No hx of sick contacts, fevers, leg swelling, palpitations.  Positive cardiac hx in pt's father who passed away due to MI. Pt is able to ambulate about 4-5 blocks at baseline, however, now able to walk around the house but with difficulty.    In the ED, pt's Cxray showed evidence of increased markings in RLL  CT Angio showing moderate emphysema, moderate pulm HTN   No pulm embolus   Trop 0.973-> 2.040, no ischemic changes  S/p Full dose Lovenox

## 2021-08-08 NOTE — PROGRESS NOTE ADULT - SUBJECTIVE AND OBJECTIVE BOX
HPI:  · Chief Complaint: The patient is a 61y Male complaining of shortness of breath.  · HPI Objective Statement: 61 yr old male with hx of HTN, HLD, DM, CABG and active smoker presents to ed c/o sob and substernal cp pta. pt was sleeping and woke up with it. no n/v, no fever, no cough, no diaphoresis, no drug use. pt did drink and smoke cigarettes throughout the day but last intake 10p. no syncope, no abd pain or dysuria        Patient is a 61y old  Male who presents with a chief complaint of     INTERVAL HPI/OVERNIGHT EVENTS:  T(C): 36.8 (08-08-21 @ 15:47), Max: 36.8 (08-08-21 @ 06:01)  HR: 81 (08-08-21 @ 15:47) (74 - 124)  BP: 120/58 (08-08-21 @ 15:47) (101/61 - 124/62)  RR: 25 (08-08-21 @ 15:47) (20 - 26)  SpO2: 99% (08-08-21 @ 15:47) (93% - 100%)  Wt(kg): --  I&O's Summary      REVIEW OF SYSTEMS: denies fever, chills, SOB, palpitations, chest pain, abdominal pain, nausea, vomitting, diarrhea, constipation, dizziness    MEDICATIONS  (STANDING):  albuterol/ipratropium for Nebulization.. 3 milliLiter(s) Nebulizer every 20 minutes  aspirin enteric coated 81 milliGRAM(s) Oral daily  clopidogrel Tablet 75 milliGRAM(s) Oral daily  insulin lispro (ADMELOG) corrective regimen sliding scale   SubCutaneous Before meals and at bedtime  lisinopril 40 milliGRAM(s) Oral daily    MEDICATIONS  (PRN):      PHYSICAL EXAM:  GENERAL: NAD, well-groomed, well-developed  HEAD:  Atraumatic, Normocephalic  EYES: EOMI, PERRLA, conjunctiva and sclera clear  ENMT: No tonsillar erythema, exudates, or enlargement; Moist mucous membranes, Good dentition, No lesions  NECK: Supple, No JVD, Normal thyroid  NERVOUS SYSTEM:  Alert & Oriented X3, Good concentration; Motor Strength 5/5 B/L upper and lower extremities; DTRs 2+ intact and symmetric  CHEST/LUNG: Clear to percussion bilaterally; No rales, rhonchi, wheezing, or rubs  HEART: Regular rate and rhythm; No murmurs, rubs, or gallops  ABDOMEN: Soft, Nontender, Nondistended; Bowel sounds present  EXTREMITIES:  2+ Peripheral Pulses, No clubbing, cyanosis, or edema  LYMPH: No lymphadenopathy noted  SKIN: No rashes or lesions  LABS:                        15.0   15.31 )-----------( 251      ( 08 Aug 2021 07:21 )             44.5     08-08    138  |  105  |  20<H>  ----------------------------<  300<H>  4.5   |  25  |  1.03    Ca    9.9      08 Aug 2021 07:21    TPro  7.1  /  Alb  3.6  /  TBili  0.6  /  DBili  x   /  AST  38  /  ALT  44  /  AlkPhos  84  08-08        CAPILLARY BLOOD GLUCOSE      POCT Blood Glucose.: 305 mg/dL (08 Aug 2021 06:09)

## 2021-08-09 DIAGNOSIS — I10 ESSENTIAL (PRIMARY) HYPERTENSION: ICD-10-CM

## 2021-08-09 DIAGNOSIS — F17.200 NICOTINE DEPENDENCE, UNSPECIFIED, UNCOMPLICATED: ICD-10-CM

## 2021-08-09 DIAGNOSIS — J18.9 PNEUMONIA, UNSPECIFIED ORGANISM: ICD-10-CM

## 2021-08-09 DIAGNOSIS — J44.1 CHRONIC OBSTRUCTIVE PULMONARY DISEASE WITH (ACUTE) EXACERBATION: ICD-10-CM

## 2021-08-09 DIAGNOSIS — R77.8 OTHER SPECIFIED ABNORMALITIES OF PLASMA PROTEINS: ICD-10-CM

## 2021-08-09 DIAGNOSIS — E11.9 TYPE 2 DIABETES MELLITUS WITHOUT COMPLICATIONS: ICD-10-CM

## 2021-08-09 LAB
A1C WITH ESTIMATED AVERAGE GLUCOSE RESULT: 7.7 % — HIGH (ref 4–5.6)
ANION GAP SERPL CALC-SCNC: 10 MMOL/L — SIGNIFICANT CHANGE UP (ref 5–17)
BASE EXCESS BLDA CALC-SCNC: -1.6 MMOL/L — SIGNIFICANT CHANGE UP (ref -2–3)
BASOPHILS # BLD AUTO: 0.06 K/UL — SIGNIFICANT CHANGE UP (ref 0–0.2)
BASOPHILS NFR BLD AUTO: 0.4 % — SIGNIFICANT CHANGE UP (ref 0–2)
BLOOD GAS COMMENTS ARTERIAL: SIGNIFICANT CHANGE UP
BUN SERPL-MCNC: 26 MG/DL — HIGH (ref 7–18)
CALCIUM SERPL-MCNC: 9.4 MG/DL — SIGNIFICANT CHANGE UP (ref 8.4–10.5)
CHLORIDE SERPL-SCNC: 104 MMOL/L — SIGNIFICANT CHANGE UP (ref 96–108)
CK MB BLD-MCNC: 18.1 % — HIGH (ref 0–3.5)
CK MB BLD-MCNC: 21.4 % — HIGH (ref 0–3.5)
CK MB CFR SERPL CALC: 17.2 NG/ML — HIGH (ref 0–3.6)
CK MB CFR SERPL CALC: 17.3 NG/ML — HIGH (ref 0–3.6)
CK SERPL-CCNC: 81 U/L — SIGNIFICANT CHANGE UP (ref 35–232)
CK SERPL-CCNC: 95 U/L — SIGNIFICANT CHANGE UP (ref 35–232)
CO2 SERPL-SCNC: 25 MMOL/L — SIGNIFICANT CHANGE UP (ref 22–31)
COVID-19 SPIKE DOMAIN AB INTERP: POSITIVE
COVID-19 SPIKE DOMAIN ANTIBODY RESULT: >250 U/ML — HIGH
CREAT SERPL-MCNC: 1.15 MG/DL — SIGNIFICANT CHANGE UP (ref 0.5–1.3)
EOSINOPHIL # BLD AUTO: 0.02 K/UL — SIGNIFICANT CHANGE UP (ref 0–0.5)
EOSINOPHIL NFR BLD AUTO: 0.1 % — SIGNIFICANT CHANGE UP (ref 0–6)
ESTIMATED AVERAGE GLUCOSE: 174 MG/DL — HIGH (ref 68–114)
GLUCOSE BLDC GLUCOMTR-MCNC: 260 MG/DL — HIGH (ref 70–99)
GLUCOSE BLDC GLUCOMTR-MCNC: 266 MG/DL — HIGH (ref 70–99)
GLUCOSE BLDC GLUCOMTR-MCNC: 303 MG/DL — HIGH (ref 70–99)
GLUCOSE BLDC GLUCOMTR-MCNC: 310 MG/DL — HIGH (ref 70–99)
GLUCOSE BLDC GLUCOMTR-MCNC: 378 MG/DL — HIGH (ref 70–99)
GLUCOSE SERPL-MCNC: 294 MG/DL — HIGH (ref 70–99)
GRAM STN FLD: SIGNIFICANT CHANGE UP
HCO3 BLDA-SCNC: 22 MMOL/L — SIGNIFICANT CHANGE UP (ref 21–28)
HCT VFR BLD CALC: 44.3 % — SIGNIFICANT CHANGE UP (ref 39–50)
HCV AB S/CO SERPL IA: 0.11 S/CO — SIGNIFICANT CHANGE UP (ref 0–0.99)
HCV AB SERPL-IMP: SIGNIFICANT CHANGE UP
HGB BLD-MCNC: 14.7 G/DL — SIGNIFICANT CHANGE UP (ref 13–17)
HOROWITZ INDEX BLDA+IHG-RTO: 28 — SIGNIFICANT CHANGE UP
IMM GRANULOCYTES NFR BLD AUTO: 0.6 % — SIGNIFICANT CHANGE UP (ref 0–1.5)
LYMPHOCYTES # BLD AUTO: 21.7 % — SIGNIFICANT CHANGE UP (ref 13–44)
LYMPHOCYTES # BLD AUTO: 3.37 K/UL — HIGH (ref 1–3.3)
MCHC RBC-ENTMCNC: 30.8 PG — SIGNIFICANT CHANGE UP (ref 27–34)
MCHC RBC-ENTMCNC: 33.2 GM/DL — SIGNIFICANT CHANGE UP (ref 32–36)
MCV RBC AUTO: 92.7 FL — SIGNIFICANT CHANGE UP (ref 80–100)
MONOCYTES # BLD AUTO: 1.02 K/UL — HIGH (ref 0–0.9)
MONOCYTES NFR BLD AUTO: 6.6 % — SIGNIFICANT CHANGE UP (ref 2–14)
NEUTROPHILS # BLD AUTO: 10.96 K/UL — HIGH (ref 1.8–7.4)
NEUTROPHILS NFR BLD AUTO: 70.6 % — SIGNIFICANT CHANGE UP (ref 43–77)
NRBC # BLD: 0 /100 WBCS — SIGNIFICANT CHANGE UP (ref 0–0)
PCO2 BLDA: 31 MMHG — LOW (ref 35–48)
PH BLDA: 7.45 — SIGNIFICANT CHANGE UP (ref 7.35–7.45)
PLATELET # BLD AUTO: 259 K/UL — SIGNIFICANT CHANGE UP (ref 150–400)
PO2 BLDA: 62 MMHG — LOW (ref 83–108)
POTASSIUM SERPL-MCNC: 4.9 MMOL/L — SIGNIFICANT CHANGE UP (ref 3.5–5.3)
POTASSIUM SERPL-SCNC: 4.9 MMOL/L — SIGNIFICANT CHANGE UP (ref 3.5–5.3)
PROCALCITONIN SERPL-MCNC: 0.11 NG/ML — HIGH (ref 0.02–0.1)
RBC # BLD: 4.78 M/UL — SIGNIFICANT CHANGE UP (ref 4.2–5.8)
RBC # FLD: 12.9 % — SIGNIFICANT CHANGE UP (ref 10.3–14.5)
SAO2 % BLDA: 94 % — SIGNIFICANT CHANGE UP
SARS-COV-2 IGG+IGM SERPL QL IA: >250 U/ML — HIGH
SARS-COV-2 IGG+IGM SERPL QL IA: POSITIVE
SODIUM SERPL-SCNC: 139 MMOL/L — SIGNIFICANT CHANGE UP (ref 135–145)
SPECIMEN SOURCE: SIGNIFICANT CHANGE UP
TROPONIN I SERPL-MCNC: 4.91 NG/ML — HIGH (ref 0–0.04)
TROPONIN I SERPL-MCNC: 5.36 NG/ML — HIGH (ref 0–0.04)
TROPONIN I SERPL-MCNC: 5.37 NG/ML — HIGH (ref 0–0.04)
WBC # BLD: 15.53 K/UL — HIGH (ref 3.8–10.5)
WBC # FLD AUTO: 15.53 K/UL — HIGH (ref 3.8–10.5)

## 2021-08-09 RX ORDER — CLOPIDOGREL BISULFATE 75 MG/1
75 TABLET, FILM COATED ORAL DAILY
Refills: 0 | Status: DISCONTINUED | OUTPATIENT
Start: 2021-08-09 | End: 2021-08-11

## 2021-08-09 RX ORDER — CEFTRIAXONE 500 MG/1
1 INJECTION, POWDER, FOR SOLUTION INTRAMUSCULAR; INTRAVENOUS
Qty: 0 | Refills: 0 | DISCHARGE
Start: 2021-08-09 | End: 2021-08-13

## 2021-08-09 RX ORDER — METOPROLOL TARTRATE 50 MG
25 TABLET ORAL
Refills: 0 | Status: DISCONTINUED | OUTPATIENT
Start: 2021-08-09 | End: 2021-08-11

## 2021-08-09 RX ORDER — HEPARIN SODIUM 5000 [USP'U]/ML
25000 INJECTION INTRAVENOUS; SUBCUTANEOUS
Qty: 0 | Refills: 0 | DISCHARGE
Start: 2021-08-09

## 2021-08-09 RX ORDER — BUDESONIDE AND FORMOTEROL FUMARATE DIHYDRATE 160; 4.5 UG/1; UG/1
4.5 AEROSOL RESPIRATORY (INHALATION)
Qty: 0 | Refills: 0 | DISCHARGE
Start: 2021-08-09

## 2021-08-09 RX ORDER — AZITHROMYCIN 500 MG/1
500 TABLET, FILM COATED ORAL
Qty: 0 | Refills: 0 | DISCHARGE
Start: 2021-08-09 | End: 2021-08-15

## 2021-08-09 RX ORDER — INSULIN GLARGINE 100 [IU]/ML
10 INJECTION, SOLUTION SUBCUTANEOUS AT BEDTIME
Refills: 0 | Status: DISCONTINUED | OUTPATIENT
Start: 2021-08-09 | End: 2021-08-10

## 2021-08-09 RX ORDER — NICOTINE POLACRILEX 2 MG
1 GUM BUCCAL
Qty: 0 | Refills: 0 | DISCHARGE
Start: 2021-08-09

## 2021-08-09 RX ORDER — INSULIN GLARGINE 100 [IU]/ML
0 INJECTION, SOLUTION SUBCUTANEOUS
Qty: 0 | Refills: 0 | DISCHARGE

## 2021-08-09 RX ORDER — ALBUTEROL 90 UG/1
1 AEROSOL, METERED ORAL EVERY 4 HOURS
Refills: 0 | Status: DISCONTINUED | OUTPATIENT
Start: 2021-08-09 | End: 2021-08-09

## 2021-08-09 RX ORDER — HEPARIN SODIUM 5000 [USP'U]/ML
INJECTION INTRAVENOUS; SUBCUTANEOUS
Qty: 25000 | Refills: 0 | Status: DISCONTINUED | OUTPATIENT
Start: 2021-08-09 | End: 2021-08-11

## 2021-08-09 RX ORDER — INSULIN LISPRO 100/ML
0 VIAL (ML) SUBCUTANEOUS
Qty: 0 | Refills: 0 | DISCHARGE

## 2021-08-09 RX ORDER — AZITHROMYCIN 500 MG/1
500 TABLET, FILM COATED ORAL EVERY 24 HOURS
Refills: 0 | Status: DISCONTINUED | OUTPATIENT
Start: 2021-08-09 | End: 2021-08-11

## 2021-08-09 RX ORDER — PANTOPRAZOLE SODIUM 20 MG/1
40 TABLET, DELAYED RELEASE ORAL
Refills: 0 | Status: DISCONTINUED | OUTPATIENT
Start: 2021-08-09 | End: 2021-08-11

## 2021-08-09 RX ORDER — METOPROLOL TARTRATE 50 MG
1 TABLET ORAL
Qty: 0 | Refills: 0 | DISCHARGE

## 2021-08-09 RX ORDER — LISINOPRIL 2.5 MG/1
1 TABLET ORAL
Qty: 0 | Refills: 0 | DISCHARGE

## 2021-08-09 RX ORDER — IPRATROPIUM/ALBUTEROL SULFATE 18-103MCG
3 AEROSOL WITH ADAPTER (GRAM) INHALATION EVERY 6 HOURS
Refills: 0 | Status: DISCONTINUED | OUTPATIENT
Start: 2021-08-09 | End: 2021-08-11

## 2021-08-09 RX ORDER — AZITHROMYCIN 500 MG/1
500 TABLET, FILM COATED ORAL
Qty: 0 | Refills: 0 | DISCHARGE
Start: 2021-08-09

## 2021-08-09 RX ORDER — CHOLECALCIFEROL (VITAMIN D3) 125 MCG
1 CAPSULE ORAL
Qty: 0 | Refills: 0 | DISCHARGE

## 2021-08-09 RX ORDER — INSULIN LISPRO 100/ML
3 VIAL (ML) SUBCUTANEOUS
Refills: 0 | Status: DISCONTINUED | OUTPATIENT
Start: 2021-08-09 | End: 2021-08-10

## 2021-08-09 RX ORDER — INSULIN LISPRO 100/ML
VIAL (ML) SUBCUTANEOUS AT BEDTIME
Refills: 0 | Status: DISCONTINUED | OUTPATIENT
Start: 2021-08-09 | End: 2021-08-11

## 2021-08-09 RX ORDER — ISOSORBIDE MONONITRATE 60 MG/1
30 TABLET, EXTENDED RELEASE ORAL DAILY
Refills: 0 | Status: DISCONTINUED | OUTPATIENT
Start: 2021-08-09 | End: 2021-08-11

## 2021-08-09 RX ORDER — NICOTINE POLACRILEX 2 MG
1 GUM BUCCAL DAILY
Refills: 0 | Status: DISCONTINUED | OUTPATIENT
Start: 2021-08-09 | End: 2021-08-11

## 2021-08-09 RX ORDER — AMLODIPINE BESYLATE 2.5 MG/1
1 TABLET ORAL
Qty: 0 | Refills: 0 | DISCHARGE

## 2021-08-09 RX ADMIN — Medication 40 MILLIGRAM(S): at 17:13

## 2021-08-09 RX ADMIN — Medication 3 MILLILITER(S): at 20:09

## 2021-08-09 RX ADMIN — CEFTRIAXONE 100 MILLIGRAM(S): 500 INJECTION, POWDER, FOR SOLUTION INTRAMUSCULAR; INTRAVENOUS at 10:49

## 2021-08-09 RX ADMIN — CLOPIDOGREL BISULFATE 75 MILLIGRAM(S): 75 TABLET, FILM COATED ORAL at 12:01

## 2021-08-09 RX ADMIN — Medication 1 PATCH: at 12:42

## 2021-08-09 RX ADMIN — ISOSORBIDE MONONITRATE 30 MILLIGRAM(S): 60 TABLET, EXTENDED RELEASE ORAL at 12:01

## 2021-08-09 RX ADMIN — AZITHROMYCIN 255 MILLIGRAM(S): 500 TABLET, FILM COATED ORAL at 10:49

## 2021-08-09 RX ADMIN — Medication 1 PATCH: at 21:24

## 2021-08-09 RX ADMIN — BUDESONIDE AND FORMOTEROL FUMARATE DIHYDRATE 2 PUFF(S): 160; 4.5 AEROSOL RESPIRATORY (INHALATION) at 10:49

## 2021-08-09 RX ADMIN — Medication 3 UNIT(S): at 12:02

## 2021-08-09 RX ADMIN — Medication 6: at 17:13

## 2021-08-09 RX ADMIN — BUDESONIDE AND FORMOTEROL FUMARATE DIHYDRATE 2 PUFF(S): 160; 4.5 AEROSOL RESPIRATORY (INHALATION) at 21:44

## 2021-08-09 RX ADMIN — Medication 3 UNIT(S): at 17:14

## 2021-08-09 RX ADMIN — Medication 50 MILLIGRAM(S): at 06:11

## 2021-08-09 RX ADMIN — HEPARIN SODIUM 950 UNIT(S)/HR: 5000 INJECTION INTRAVENOUS; SUBCUTANEOUS at 17:14

## 2021-08-09 RX ADMIN — Medication 8: at 08:07

## 2021-08-09 RX ADMIN — Medication 25 MILLIGRAM(S): at 17:14

## 2021-08-09 RX ADMIN — Medication 10: at 21:44

## 2021-08-09 RX ADMIN — Medication 3 MILLILITER(S): at 14:31

## 2021-08-09 RX ADMIN — Medication 81 MILLIGRAM(S): at 12:01

## 2021-08-09 RX ADMIN — Medication 6: at 12:02

## 2021-08-09 RX ADMIN — ENOXAPARIN SODIUM 80 MILLIGRAM(S): 100 INJECTION SUBCUTANEOUS at 06:11

## 2021-08-09 RX ADMIN — LISINOPRIL 40 MILLIGRAM(S): 2.5 TABLET ORAL at 06:11

## 2021-08-09 RX ADMIN — INSULIN GLARGINE 10 UNIT(S): 100 INJECTION, SOLUTION SUBCUTANEOUS at 21:43

## 2021-08-09 RX ADMIN — Medication 3 MILLILITER(S): at 09:38

## 2021-08-09 NOTE — DISCHARGE NOTE PROVIDER - CARE PROVIDER_API CALL
Oswald Pulido)  Internal Medicine  [address], [address]  [place] [address]  Phone: [phone]  Fax: [phone]  Follow Up Time:    Oswald Pulido)  Internal Medicine  [address] [facility], [address]  [place] [address]  Phone: [phone]  Fax: [phone]  Follow Up Time:     Albert Valenzuela  Phone: [phone]  Fax: (   )    -  Follow Up Time:     albert valenzuela  [address]  Phone: [phone]  Fax: (   )    -  Follow Up Time:

## 2021-08-09 NOTE — DISCHARGE NOTE PROVIDER - NSDCMRMEDTOKEN_GEN_ALL_CORE_FT
amLODIPine 10 mg oral tablet: 1 tab(s) orally once a day  amLODIPine 5 mg oral tablet: 1 tab(s) orally once a day  Aspir 81 oral delayed release tablet: 1 tab(s) orally once a day  Aspir 81 oral delayed release tablet: 1 tab(s) orally once a day  clopidogrel 75 mg oral tablet: 1 tab(s) orally once a day  Discharge Instructions: Follow up with Dr. Price in 1-2 weeks  HumaLOG 100 units/mL subcutaneous solution: 5 unit(s) subcutaneous 3 times a day (with meals) and sliding scale  HumaLOG 100 units/mL subcutaneous solution:   isosorbide mononitrate 60 mg oral tablet, extended release: 1 tab(s) orally once a day (in the morning)  lisinopril 40 mg oral tablet: 1 tab(s) orally 2 times a day  lisinopril 40 mg oral tablet: 1 tab(s) orally once a day  metoprolol succinate 100 mg oral tablet, extended release: 1 tab(s) orally once a day  metoprolol succinate 100 mg oral tablet, extended release: 1 tab(s) orally once a day  Plavix 75 mg oral tablet: 1 tab(s) orally once a day  pravastatin 40 mg oral tablet: 1 tab(s) orally once a day  pravastatin 40 mg oral tablet: 1 tab(s) orally once a day  Toujeo SoloStar 300 units/mL subcutaneous solution: 18 unit(s) subcutaneous once a day (at bedtime)  Toujeo SoloStar 300 units/mL subcutaneous solution:   Vitamin C 1000 mg oral tablet: 1 tab(s) orally once a day  Vitamin D3: orally 2 times a week  Vitamin D3 125 mcg (5000 intl units) oral tablet: 1 tab(s) orally once a day   amLODIPine 10 mg oral tablet: 1 tab(s) orally once a day  amLODIPine 5 mg oral tablet: 1 tab(s) orally once a day  Aspir 81 oral delayed release tablet: 1 tab(s) orally once a day  Aspir 81 oral delayed release tablet: 1 tab(s) orally once a day  azithromycin 500 mg intravenous injection: 500 milligram(s) intravenous every 24 hours  budesonide-formoterol 80 mcg-4.5 mcg/inh inhalation aerosol: 4.5 microgram(s) inhaled prn  cefTRIAXone: 1 gram(s) intravenous once a day  clopidogrel 75 mg oral tablet: 1 tab(s) orally once a day  Discharge Instructions: Follow up with Dr. Price in 1-2 weeks  heparin 100 units/mL-D5% intravenous solution: 14081 unit(s) by continuous intravenous infusion    9.5ml/h  HumaLOG 100 units/mL subcutaneous solution:   HumaLOG 100 units/mL subcutaneous solution: 5 unit(s) subcutaneous 3 times a day (with meals) and sliding scale  isosorbide mononitrate 60 mg oral tablet, extended release: 1 tab(s) orally once a day (in the morning)  lisinopril 40 mg oral tablet: 1 tab(s) orally 2 times a day  lisinopril 40 mg oral tablet: 1 tab(s) orally once a day  metoprolol succinate 100 mg oral tablet, extended release: 1 tab(s) orally once a day  metoprolol succinate 100 mg oral tablet, extended release: 1 tab(s) orally once a day  nicotine 14 mg/24 hr transdermal film, extended release: 1 patch transdermal once a day  Plavix 75 mg oral tablet: 1 tab(s) orally once a day  pravastatin 40 mg oral tablet: 1 tab(s) orally once a day  pravastatin 40 mg oral tablet: 1 tab(s) orally once a day  Toujeo SoloStar 300 units/mL subcutaneous solution: 18 unit(s) subcutaneous once a day (at bedtime)  Toujeo SoloStar 300 units/mL subcutaneous solution:   Vitamin C 1000 mg oral tablet: 1 tab(s) orally once a day  Vitamin D3: orally 2 times a week  Vitamin D3 125 mcg (5000 intl units) oral tablet: 1 tab(s) orally once a day   amLODIPine 10 mg oral tablet: 1 tab(s) orally once a day  Aspir 81 oral delayed release tablet: 1 tab(s) orally once a day  azithromycin 500 mg intravenous injection: 500 milligram(s) intravenous every 24 hours  azithromycin 500 mg intravenous injection: 500 milligram(s) intravenous every 24 hours  budesonide-formoterol 80 mcg-4.5 mcg/inh inhalation aerosol: 4.5 microgram(s) inhaled prn  cefTRIAXone: 1 gram(s) intravenous once a day  clopidogrel 75 mg oral tablet: 1 tab(s) orally once a day  Discharge Instructions: Follow up with Dr. Price in 1-2 weeks  heparin 100 units/mL-D5% intravenous solution: 45580 unit(s) by continuous intravenous infusion    9.5ml/h  HumaLOG 100 units/mL subcutaneous solution: 5 unit(s) subcutaneous 3 times a day (with meals) and sliding scale  isosorbide mononitrate 60 mg oral tablet, extended release: 1 tab(s) orally once a day (in the morning)  lisinopril 40 mg oral tablet: 1 tab(s) orally once a day  metoprolol succinate 100 mg oral tablet, extended release: 1 tab(s) orally once a day  nicotine 14 mg/24 hr transdermal film, extended release: 1 patch transdermal once a day  Plavix 75 mg oral tablet: 1 tab(s) orally once a day  pravastatin 40 mg oral tablet: 1 tab(s) orally once a day  Toujeo SoloStar 300 units/mL subcutaneous solution: 18 unit(s) subcutaneous once a day (at bedtime)  Vitamin C 1000 mg oral tablet: 1 tab(s) orally once a day  Vitamin D3 125 mcg (5000 intl units) oral tablet: 1 tab(s) orally once a day

## 2021-08-09 NOTE — CONSULT NOTE ADULT - SUBJECTIVE AND OBJECTIVE BOX
CHIEF COMPLAINT:Patient is a 61y old  Male who presents with a chief complaint of sob,chest tightness.    HPI:  Patient is a 60 y/o Male with medical hx significant for HTN, HLD, DM, CAD with triple vessel bypass, PAD s/o Right LE stent, and Active smoker presenting to the ED due to shortness of breath since early this morning. He had an episode of shortness of breath on Tuesday morning, pt and pt's wife attributed it to pt's hx of Anxiety. However, today morning, he woke up around 4 am saying "I can't breath" prompting ED admission. Pt also associated with cough and minimal sputum production (clear or white in color, not blood tinged or streaked). Pt complains of chest tightness, not chest pain. Pt and pt's wife attended a gathering with about 30 people in close proximity yesterday, both pt and pt's wife are vaccinated. No hx of sick contacts, fevers, leg swelling, palpitations.  Positive cardiac hx in pt's father who passed away due to MI. Pt is able to ambulate about 4-5 blocks at baseline, however, now able to walk around the house but with difficulty.    In the ED, pt's Cxray showed evidence of increased markings in RLL  CT Angio showing moderate emphysema, moderate pulm HTN   No pulm embolus   Trop 0.973-> 2.040, no ischemic changes  S/p Full dose Lovenox (08 Aug 2021 20:08)      PAST MEDICAL & SURGICAL HISTORY:  HTN (hypertension)  CAD  S?P CABG  PAD  DM  HTN  Lipid d/o  Emphysema      MEDICATIONS  (STANDING):  albuterol/ipratropium for Nebulization.. 3 milliLiter(s) Nebulizer every 20 minutes  aspirin enteric coated 81 milliGRAM(s) Oral daily  azithromycin  IVPB 500 milliGRAM(s) IV Intermittent every 24 hours  budesonide  80 MICROgram(s)/formoterol 4.5 MICROgram(s) Inhaler 2 Puff(s) Inhalation two times a day  cefTRIAXone   IVPB 1000 milliGRAM(s) IV Intermittent every 24 hours  clopidogrel Tablet 75 milliGRAM(s) Oral daily  enoxaparin Injectable 80 milliGRAM(s) SubCutaneous two times a day  insulin lispro (ADMELOG) corrective regimen sliding scale   SubCutaneous Before meals and at bedtime  lisinopril 40 milliGRAM(s) Oral daily  metoprolol tartrate 50 milliGRAM(s) Oral two times a day  pantoprazole    Tablet 40 milliGRAM(s) Oral before breakfast    MEDICATIONS  (PRN):  ALBUTerol    90 MICROgram(s) HFA Inhaler 2 Puff(s) Inhalation every 6 hours PRN Shortness of Breath and/or Wheezing      FAMILY HISTORY:No hx of CAD      SOCIAL HISTORY:    [x ] Smoker-active  [ x] Alcohol-denies    Allergies    penicillin (Short breath; Rash)    Intolerances    	    REVIEW OF SYSTEMS:  CONSTITUTIONAL: No fever, weight loss, or fatigue  EYES: No eye pain, visual disturbances, or discharge  ENT:  No difficulty hearing, tinnitus, vertigo; No sinus or throat pain  NECK: No pain or stiffness  RESPIRATORY: No cough, wheezing, chills or hemoptysis; + Shortness of Breath  CARDIOVASCULAR: No chest pain, palpitations, passing out, dizziness, or leg swelling  GASTROINTESTINAL: No abdominal or epigastric pain. No nausea, vomiting, or hematemesis; No diarrhea or constipation. No melena or hematochezia.  GENITOURINARY: No dysuria, frequency, hematuria, or incontinence  NEUROLOGICAL: No headaches, memory loss, loss of strength, numbness, or tremors  SKIN: No itching, burning, rashes, or lesions   LYMPH Nodes: No enlarged glands  ENDOCRINE: No heat or cold intolerance; No hair loss  MUSCULOSKELETAL: No joint pain or swelling; No muscle, back, or extremity pain  PSYCHIATRIC: No depression, anxiety, mood swings, or difficulty sleeping  HEME/LYMPH: No easy bruising, or bleeding gums  ALLERGY AND IMMUNOLOGIC: No hives or eczema	      PHYSICAL EXAM:  T(C): 37 (08-09-21 @ 04:40), Max: 37.2 (08-08-21 @ 23:00)  HR: 90 (08-09-21 @ 04:40) (74 - 92)  BP: 132/59 (08-09-21 @ 04:40) (101/61 - 132/59)  RR: 22 (08-09-21 @ 04:40) (20 - 26)  SpO2: 95% (08-09-21 @ 07:27) (95% - 100%)  Wt(kg): --  I&O's Summary    08 Aug 2021 07:01  -  09 Aug 2021 07:00  --------------------------------------------------------  IN: 0 mL / OUT: 125 mL / NET: -125 mL        Appearance: Normal	  HEENT:   Normal oral mucosa, PERRL, EOMI	  Lymphatic: No lymphadenopathy  Cardiovascular: Normal S1 S2, No JVD, No murmurs, No edema  Respiratory: Lungs clear to auscultation	  Psychiatry: A & O x 3, Mood & affect appropriate  Gastrointestinal:  Soft, Non-tender, + BS	  Skin: No rashes, No ecchymoses, No cyanosis	  Neurologic: Non-focal  Extremities: Normal range of motion, No clubbing, cyanosis or edema  Vascular: Peripheral pulses palpable 2+ bilaterally      ECG:  	NSR with frequent pvc's.IVCD,poor r wave progression  	  	  LABS:	 	    CARDIAC MARKERS ( 09 Aug 2021 01:04 )  5.370 ng/mL / x     / 81 U/L / x     / 17.3 ng/mL  CARDIAC MARKERS ( 08 Aug 2021 20:23 )  3.590 ng/mL / x     / x     / x     / x      CARDIAC MARKERS ( 08 Aug 2021 14:16 )  2.040 ng/mL / x     / x     / x     / x      CARDIAC MARKERS ( 08 Aug 2021 07:21 )  0.973 ng/mL / x     / x     / x     / x                                  15.0   15.31 )-----------( 251      ( 08 Aug 2021 07:21 )             44.5     08-08    138  |  105  |  20<H>  ----------------------------<  300<H>  4.5   |  25  |  1.03    Ca    9.9      08 Aug 2021 07:21    TPro  7.1  /  Alb  3.6  /  TBili  0.6  /  DBili  x   /  AST  38  /  ALT  44  /  AlkPhos  84  08-08      r< from: CT Angio Chest PE Protocol w/ IV Cont (08.08.21 @ 13:23) >  EXAM:  CT ANGIO CHEST PULM [facility]                            PROCEDURE DATE:  08/08/2021          INTERPRETATION:  CLINICAL INFORMATION: 61-year-old man with shortness of breath and substernal chest pain. History of CABG and smoking.    COMPARISON: None.    CONTRAST/COMPLICATIONS:  IV Contrast: Omnipaque 350  50 cc administered   50 cc discarded  Oral Contrast: NONE  Complications: None reported at time of study completion    PROCEDURE:  CT Angiography of the Chest.  Sagittal and coronal reformats were performed as well as 3D (MIP) reconstructions.    FINDINGS:    LUNGS AND AIRWAYS: Patent central airways.  Moderately severe emphysema. Moderately extensive right lower lobe atelectasis. Subsegmental left lower lobe atelectasis. Interlobular septal thickening which may be secondary to pulmonary interstitial edema  PLEURA: Small bilateral pleural effusions.  MEDIASTINUM AND TREY: AP window lymph node 19 x 11 mm. Several additional small lymph nodes.  VESSELS: No pulmonary embolus. Main pulmonary artery dilated to 3.6 cm suggesting pulmonary arterial hypertension.  HEART: Heart size is borderline. No pericardial effusion. Coronary artery calcification.  CHEST WALL AND LOWER NECK: Within normal limits.  VISUALIZED UPPER ABDOMEN: 12 mmirregular hypodensity posterior liver dome.  BONES: Within normal limits.    IMPRESSION:  No pulmonary embolus.  Moderately severe emphysema.  Enlarged main pulmonary artery suggesting pulmonary arterial hypertension.  Appearance suggesting pulmonaryinterstitial edema          VANIA ARAYA MD; Attending Radiologist  This document has been electronically signed. Aug  8 2021  2:02PM

## 2021-08-09 NOTE — CHART NOTE - NSCHARTNOTEFT_GEN_A_CORE
Patient had elevated troponin levels, repeat EKG was similar to previous. Likely demand ischemia, follow up repeat troponin in AM. Currently on full dose Lovenox.

## 2021-08-09 NOTE — CONSULT NOTE ADULT - ASSESSMENT
60 y/o Male with medical hx significant for HTN, HLD, DM, CAD with triple vessel bypass, PAD s/o Right LE stent, and Active smoker presenting to the ED due to shortness of breath.  1.Tele monitoring.  2.Echocardiogram.  3.CE q 8 until peak.  4.NSTEMI-asa,plavix,lovenox.  5.DM-Insulin.  6.CAD-asa,plavix,b blocker,add imdur 30mg qd.  7.HTN-Ace.  8.COPD-MDI.  9.Smoking cessation.  10.GI and DVT prophylaxis.   62 y/o Male with medical hx significant for HTN, HLD, DM, CAD with triple vessel bypass, PAD s/o Right LE stent, and Active smoker presenting to the ED due to shortness of breath.  1.Tele monitoring.  2.Echocardiogram.  3.CE q 8 until peak.  4.NSTEMI-asa,plavix,lovenox to be changed to heparin drip.D/W outpatient cardiologist Dr. Nicolas Price will set up cardiac cath for AM at [facility].  5.DM-Insulin.  6.CAD-asa,plavix,b blocker, imdur 30mg qd.  7.HTN-Ace.  8.COPD-MDI.  9.Smoking cessation.  10.GI and DVT prophylaxis.

## 2021-08-09 NOTE — PROGRESS NOTE ADULT - ASSESSMENT
seen and examined  on bed with nasal canula  denies cp or palp.  sob is unchanged rr 20  no abd pain  lungs a/e slightly decreased b/l  no added sounds  labs noted  trop over 5  a/p NON STT MI vs demand ischemia sec to copd excerbration  pt is on lovenox, , asa plavix b blockers  imdur  to be transferred for angiogram in am HPI:  Patient is a 62 y/o Male with medical hx significant for HTN, HLD, DM, CAD with triple vessel bypass, PAD s/o Right LE stent, and Active smoker presenting to the ED due to shortness of breath since early this morning. Pt also associated with cough and minimal sputum production (clear or white in color, not blood tinged or streaked). Pt complains of chest tightness, not chest pain. No hx of sick contacts, fevers, leg swelling, palpitations.      In the ED, pt's Cxray showed evidence of increased markings in RLL  CT Angio showing moderate emphysema, moderate pulm HTN   Trop 0.973->5 Possible ischemia in inferior leads     Overnight situation unchanged    Exam: 90% sats on 5 L, expiratory rhonchi and accessory muscle use, Trop uptrended to 5.3

## 2021-08-09 NOTE — DISCHARGE NOTE PROVIDER - HOSPITAL COURSE
Patient is  a 61 Y o male hx if HTN, HLD, DM, CAD tripple vessel  CABG, PAD LE stents, and a current smoker who came in to the ED with SOB, cough and white sputum and some chest tightness.     He was Afebrile with, however his O2 sats were 87% and required NRB mask   CXR showed  some RLL interstitial infiltrates   CT- showed emphysema and some pulm htn  Trops trended up from 0.9-5 with negative EKG findings for any ST elevations or ischemia  Labs had elevated BNP    He was admitted 8/8/2021 for respiratory failure 2/2 to RLL PNA, and ACS rule out     Overnight he has had no sleep due to active work ups  Has had some nausea and post prandial emesis of stomach contents  Exam   HR 90, BP 130s/59 RR 22 and Sats 96% on 5L NC  Using accessory muscles of respiration  Coarse expiratory rhonchi    Labs  ABG    He is therefore being treated with rocephin and Azithromycin for PNA    And pending Echo   and possible cath in [facility] Patient is  a 61 Y o male hx if HTN, HLD, DM, CAD tripple vessel  CABG, PAD LE stents, and a current smoker who came in to the ED with SOB, cough and white sputum and some chest tightness.     He was Afebrile with, however his O2 sats were 87% and required NRB mask   CXR showed  some RLL interstitial infiltrates   CT- showed emphysema and some pulm htn  Trops trended up from 0.9-5 with negative EKG findings for any ST elevations or but possible depressions in inferior leads  Labs had elevated BNP    He was admitted 8/8/2021 for respiratory failure 2/2 to RLL PNA, and ACS rule out     Overnight he has had no sleep due to active work ups  Has had some nausea and post prandial emesis of stomach contents  Exam   HR 90, BP 130s/59 RR 22 and Sats 96% on 5L NC  Using accessory muscles of respiration  Coarse expiratory rhonchi    Labs  ABG    He is therefore being treated with rocephin and Azithromycin for PNA    And pending Echo     Cardiac cath in [facility]   Patient is  a 61 Y o male hx if HTN, HLD, DM, CAD tripple vessel  CABG, PAD LE stents, and a current smoker who came in to the ED with SOB, cough and white sputum and some chest tightness.     He was Afebrile with, however his O2 sats were 87% and required NRB mask   CXR showed  some RLL interstitial infiltrates   CT- showed emphysema and some pulm htn  Trops trended up from 0.9-5 with negative EKG findings for any ST elevations or but possible depressions in inferior leads  Labs had elevated BNP    He was admitted 8/8/2021 for respiratory failure 2/2 to RLL PNA, and ACS rule out     Overnight he has had no sleep due to active work ups  Has had some nausea and post prandial emesis of stomach contents  Exam   HR 90, BP 130s/59 RR 22 and Sats 96% on 5L NC  Using accessory muscles of respiration  Coarse expiratory rhonchi    Labs unremarkable other than trop  ABG STAT 7.45 CO2 30 PO2 60s now satting 94 on 4 L    He is therefore being treated with rocephin stop date 9/13 and Azithromycin to end on 9/15 for PNA  Given Duoneb q6 for the rhonchi has emphysema. Also given solumedrol   And pending Echo   Cardiac cath in [facility]

## 2021-08-09 NOTE — DISCHARGE NOTE PROVIDER - PROVIDER TOKENS
PROVIDER:[TOKEN:[[id number]]] PROVIDER:[TOKEN:[[id number]]],FREE:[LAST:[Nicolas],FIRST:[Albert],PHONE:[phone]:[(   )    -]],FREE:[LAST:[nicolas],FIRST:[albert],PHONE:[phone]],FAX:[(   )    -],ADDRESS:[[address]]]

## 2021-08-09 NOTE — PROGRESS NOTE ADULT - SUBJECTIVE AND OBJECTIVE BOX
HPI:  Patient is a 62 y/o Male with medical hx significant for HTN, HLD, DM, CAD with triple vessel bypass, PAD s/o Right LE stent, and Active smoker presenting to the ED due to shortness of breath since early this morning. He had an episode of shortness of breath on Tuesday morning, pt and pt's wife attributed it to pt's hx of Anxiety. However, today morning, he woke up around 4 am saying "I can't breath" prompting ED admission. Pt also associated with cough and minimal sputum production (clear or white in color, not blood tinged or streaked). Pt complains of chest tightness, not chest pain. Pt and pt's wife attended a gathering with about 30 people in close proximity yesterday, both pt and pt's wife are vaccinated. No hx of sick contacts, fevers, leg swelling, palpitations.  Positive cardiac hx in pt's father who passed away due to MI. Pt is able to ambulate about 4-5 blocks at baseline, however, now able to walk around the house but with difficulty.    In the ED, pt's Cxray showed evidence of increased markings in RLL  CT Angio showing moderate emphysema, moderate pulm HTN   No pulm embolus   Trop 0.973-> 2.040, no ischemic changes  S/p Full dose Lovenox (08 Aug 2021 20:08)      Patient is a 61y old  Male who presents with a chief complaint of SOB (09 Aug 2021 12:10)      INTERVAL HPI/OVERNIGHT EVENTS:  T(C): 37.2 (08-09-21 @ 15:17), Max: 37.7 (08-09-21 @ 07:28)  HR: 89 (08-09-21 @ 15:17) (72 - 90)  BP: 124/67 (08-09-21 @ 15:17) (114/59 - 134/60)  RR: 18 (08-09-21 @ 15:17) (18 - 24)  SpO2: 95% (08-09-21 @ 15:17) (94% - 99%)  Wt(kg): --  I&O's Summary    08 Aug 2021 07:01  -  09 Aug 2021 07:00  --------------------------------------------------------  IN: 0 mL / OUT: 125 mL / NET: -125 mL        REVIEW OF SYSTEMS: denies fever, chills, SOB, palpitations, chest pain, abdominal pain, nausea, vomitting, diarrhea, constipation, dizziness    MEDICATIONS  (STANDING):  albuterol/ipratropium for Nebulization 3 milliLiter(s) Nebulizer every 6 hours  aspirin enteric coated 81 milliGRAM(s) Oral daily  azithromycin  IVPB 500 milliGRAM(s) IV Intermittent every 24 hours  budesonide  80 MICROgram(s)/formoterol 4.5 MICROgram(s) Inhaler 2 Puff(s) Inhalation two times a day  cefTRIAXone   IVPB 1000 milliGRAM(s) IV Intermittent every 24 hours  clopidogrel Tablet 75 milliGRAM(s) Oral daily  heparin  Infusion.  Unit(s)/Hr (9.5 mL/Hr) IV Continuous <Continuous>  insulin glargine Injectable (LANTUS) 10 Unit(s) SubCutaneous at bedtime  insulin lispro (ADMELOG) corrective regimen sliding scale   SubCutaneous at bedtime  insulin lispro (ADMELOG) corrective regimen sliding scale   SubCutaneous Before meals and at bedtime  insulin lispro Injectable (ADMELOG) 3 Unit(s) SubCutaneous three times a day before meals  isosorbide   mononitrate ER Tablet (IMDUR) 30 milliGRAM(s) Oral daily  lisinopril 40 milliGRAM(s) Oral daily  methylPREDNISolone sodium succinate Injectable 40 milliGRAM(s) IV Push two times a day  metoprolol tartrate 25 milliGRAM(s) Oral two times a day  nicotine -  14 mG/24Hr(s) Patch 1 patch Transdermal daily  pantoprazole    Tablet 40 milliGRAM(s) Oral before breakfast    MEDICATIONS  (PRN):  ALBUTerol    90 MICROgram(s) HFA Inhaler 2 Puff(s) Inhalation every 6 hours PRN Shortness of Breath and/or Wheezing      PHYSICAL EXAM:  GENERAL: NAD, well-groomed, well-developed  HEAD:  Atraumatic, Normocephalic  EYES: EOMI, PERRLA, conjunctiva and sclera clear  ENMT: No tonsillar erythema, exudates, or enlargement; Moist mucous membranes, Good dentition, No lesions  NECK: Supple, No JVD, Normal thyroid  NERVOUS SYSTEM:  Alert & Oriented X3, Good concentration; Motor Strength 5/5 B/L upper and lower extremities; DTRs 2+ intact and symmetric  CHEST/LUNG: Clear to percussion bilaterally; No rales, rhonchi, wheezing, or rubs  HEART: Regular rate and rhythm; No murmurs, rubs, or gallops  ABDOMEN: Soft, Nontender, Nondistended; Bowel sounds present  EXTREMITIES:  2+ Peripheral Pulses, No clubbing, cyanosis, or edema  LYMPH: No lymphadenopathy noted  SKIN: No rashes or lesions  LABS:                        14.7   15.53 )-----------( 259      ( 09 Aug 2021 08:47 )             44.3     08-09    139  |  104  |  26<H>  ----------------------------<  294<H>  4.9   |  25  |  1.15    Ca    9.4      09 Aug 2021 08:47    TPro  7.1  /  Alb  3.6  /  TBili  0.6  /  DBili  x   /  AST  38  /  ALT  44  /  AlkPhos  84  08-08        CAPILLARY BLOOD GLUCOSE  373 (08 Aug 2021 20:16)      POCT Blood Glucose.: 260 mg/dL (09 Aug 2021 16:39)  POCT Blood Glucose.: 303 mg/dL (09 Aug 2021 14:04)  POCT Blood Glucose.: 266 mg/dL (09 Aug 2021 11:52)  POCT Blood Glucose.: 310 mg/dL (09 Aug 2021 07:30)      ABG - ( 09 Aug 2021 11:34 )  pH, Arterial: 7.45  pH, Blood: x     /  pCO2: 31    /  pO2: 62    / HCO3: 22    / Base Excess: -1.6  /  SaO2: 94                 HPI:  Patient is a 60 y/o Male with medical hx significant for HTN, HLD, DM, CAD with triple vessel bypass, PAD s/o Right LE stent, and Active smoker presenting to the ED due to shortness of breath since early this morning. Pt also associated with cough and minimal sputum production (clear or white in color, not blood tinged or streaked). Pt complains of chest tightness, not chest pain. No hx of sick contacts, fevers, leg swelling, palpitations.      In the ED, pt's Cxray showed evidence of increased markings in RLL  CT Angio showing moderate emphysema, moderate pulm HTN   Trop 0.973->5 Possible ischemia in inferior leads   S/p Full dose Lovenox (08 Aug 2021 20:08)      Patient is a 61y old  Male who presents with a chief complaint of SOB (09 Aug 2021 12:10)      INTERVAL HPI/OVERNIGHT EVENTS:    Situation is unchanged    T(C): 37.2 (08-09-21 @ 15:17), Max: 37.7 (08-09-21 @ 07:28)  HR: 89 (08-09-21 @ 15:17) (72 - 90)  BP: 124/67 (08-09-21 @ 15:17) (114/59 - 134/60)  RR: 18 (08-09-21 @ 15:17) (18 - 24)  SpO2: 95% (08-09-21 @ 15:17) (94% - 99%)  Wt(kg): --  I&O's Summary    08 Aug 2021 07:01  -  09 Aug 2021 07:00  --------------------------------------------------------  IN: 0 mL / OUT: 125 mL / NET: -125 mL        REVIEW OF SYSTEMS: denies fever, chills, SOB, palpitations, chest pain, abdominal pain, nausea, vomitting, diarrhea, constipation, dizziness    MEDICATIONS  (STANDING):  albuterol/ipratropium for Nebulization 3 milliLiter(s) Nebulizer every 6 hours  aspirin enteric coated 81 milliGRAM(s) Oral daily  azithromycin  IVPB 500 milliGRAM(s) IV Intermittent every 24 hours  budesonide  80 MICROgram(s)/formoterol 4.5 MICROgram(s) Inhaler 2 Puff(s) Inhalation two times a day  cefTRIAXone   IVPB 1000 milliGRAM(s) IV Intermittent every 24 hours  clopidogrel Tablet 75 milliGRAM(s) Oral daily  heparin  Infusion.  Unit(s)/Hr (9.5 mL/Hr) IV Continuous <Continuous>  insulin glargine Injectable (LANTUS) 10 Unit(s) SubCutaneous at bedtime  insulin lispro (ADMELOG) corrective regimen sliding scale   SubCutaneous at bedtime  insulin lispro (ADMELOG) corrective regimen sliding scale   SubCutaneous Before meals and at bedtime  insulin lispro Injectable (ADMELOG) 3 Unit(s) SubCutaneous three times a day before meals  isosorbide   mononitrate ER Tablet (IMDUR) 30 milliGRAM(s) Oral daily  lisinopril 40 milliGRAM(s) Oral daily  methylPREDNISolone sodium succinate Injectable 40 milliGRAM(s) IV Push two times a day  metoprolol tartrate 25 milliGRAM(s) Oral two times a day  nicotine -  14 mG/24Hr(s) Patch 1 patch Transdermal daily  pantoprazole    Tablet 40 milliGRAM(s) Oral before breakfast    MEDICATIONS  (PRN):  ALBUTerol    90 MICROgram(s) HFA Inhaler 2 Puff(s) Inhalation every 6 hours PRN Shortness of Breath and/or Wheezing      PHYSICAL EXAM:  GENERAL: NAD, well-groomed, well-developed  HEAD:  Atraumatic, Normocephalic  NECK: Supple, No JVD, Normal thyroid  NERVOUS SYSTEM:  Alert & Oriented X3, Good concentration; Motor Strength 5/5 B/L upper and lower extremities; DTRs 2+ intact and symmetric  CHEST/LUNG: wheezing and expiratory rhonchi with accessory muscle use  HEART: Regular rate and rhythm; No murmurs, rubs, or gallops  ABDOMEN: Soft, Nontender, Nondistended; Bowel sounds present  EXTREMITIES:  2+ Peripheral Pulses, No clubbing, cyanosis, or edema    LABS:                        14.7   15.53 )-----------( 259      ( 09 Aug 2021 08:47 )             44.3     08-09    139  |  104  |  26<H>  ----------------------------<  294<H>  4.9   |  25  |  1.15    Ca    9.4      09 Aug 2021 08:47    TPro  7.1  /  Alb  3.6  /  TBili  0.6  /  DBili  x   /  AST  38  /  ALT  44  /  AlkPhos  84  08-08        CAPILLARY BLOOD GLUCOSE  373 (08 Aug 2021 20:16)      POCT Blood Glucose.: 260 mg/dL (09 Aug 2021 16:39)  POCT Blood Glucose.: 303 mg/dL (09 Aug 2021 14:04)  POCT Blood Glucose.: 266 mg/dL (09 Aug 2021 11:52)  POCT Blood Glucose.: 310 mg/dL (09 Aug 2021 07:30)      ABG - ( 09 Aug 2021 11:34 )  pH, Arterial: 7.45  pH, Blood: x     /  pCO2: 31    /  pO2: 62    / HCO3: 22    / Base Excess: -1.6  /  SaO2: 94

## 2021-08-09 NOTE — DISCHARGE NOTE PROVIDER - NSDCCPCAREPLAN_GEN_ALL_CORE_FT
PRINCIPAL DISCHARGE DIAGNOSIS  Diagnosis: Pneumonia of right lung due to infectious organism, unspecified part of lung  Assessment and Plan of Treatment: You came in with SOB at rest with some swelling in the legs, CXR  RLL  so for that you are on antibiotics for   -ceftriaxone 5 days  -Azythromycin 7 days   -ending on 9/13/ 21 and 9/15 respectively  -They can continue at [facility]      SECONDARY DISCHARGE DIAGNOSES  Diagnosis: COPD exacerbation  Assessment and Plan of Treatment: You are 61y o male, who is currently smoking. You came in with shortness of breath at rest, your oxygen was low on exam and your ABGs have low o2 content. Also you have a super imposed Pneumonia. You also have emphysema in the CT.   In addition you have some chest tightness with elevated tropoins and possible inferior ischemia on EKG, Meaning your right heart is straining. The CT also showed some pulmonary HTN.   -You have used Duonebs every 6h  - You need an inhaler of albuterol or symbicort  -You need to go to pulmonology  -you need to stop smoking

## 2021-08-10 LAB
-  COAGULASE NEGATIVE STAPHYLOCOCCUS: SIGNIFICANT CHANGE UP
A1C WITH ESTIMATED AVERAGE GLUCOSE RESULT: 7.7 % — HIGH (ref 4–5.6)
APTT BLD: 40.2 SEC — HIGH (ref 27.5–35.5)
APTT BLD: 43.1 SEC — HIGH (ref 27.5–35.5)
APTT BLD: 51.9 SEC — HIGH (ref 27.5–35.5)
CULTURE RESULTS: SIGNIFICANT CHANGE UP
ESTIMATED AVERAGE GLUCOSE: 174 MG/DL — HIGH (ref 68–114)
GLUCOSE BLDC GLUCOMTR-MCNC: 331 MG/DL — HIGH (ref 70–99)
GLUCOSE BLDC GLUCOMTR-MCNC: 353 MG/DL — HIGH (ref 70–99)
GLUCOSE BLDC GLUCOMTR-MCNC: 373 MG/DL — HIGH (ref 70–99)
GLUCOSE BLDC GLUCOMTR-MCNC: 397 MG/DL — HIGH (ref 70–99)
GLUCOSE BLDC GLUCOMTR-MCNC: 431 MG/DL — HIGH (ref 70–99)
GLUCOSE BLDC GLUCOMTR-MCNC: 435 MG/DL — HIGH (ref 70–99)
GLUCOSE BLDC GLUCOMTR-MCNC: 477 MG/DL — CRITICAL HIGH (ref 70–99)
HCT VFR BLD CALC: 38.3 % — LOW (ref 39–50)
HCT VFR BLD CALC: 41.1 % — SIGNIFICANT CHANGE UP (ref 39–50)
HGB BLD-MCNC: 13.2 G/DL — SIGNIFICANT CHANGE UP (ref 13–17)
HGB BLD-MCNC: 13.7 G/DL — SIGNIFICANT CHANGE UP (ref 13–17)
MCHC RBC-ENTMCNC: 30.6 PG — SIGNIFICANT CHANGE UP (ref 27–34)
MCHC RBC-ENTMCNC: 31.5 PG — SIGNIFICANT CHANGE UP (ref 27–34)
MCHC RBC-ENTMCNC: 33.3 GM/DL — SIGNIFICANT CHANGE UP (ref 32–36)
MCHC RBC-ENTMCNC: 34.5 GM/DL — SIGNIFICANT CHANGE UP (ref 32–36)
MCV RBC AUTO: 91.4 FL — SIGNIFICANT CHANGE UP (ref 80–100)
MCV RBC AUTO: 91.9 FL — SIGNIFICANT CHANGE UP (ref 80–100)
METHOD TYPE: SIGNIFICANT CHANGE UP
NRBC # BLD: 0 /100 WBCS — SIGNIFICANT CHANGE UP (ref 0–0)
NRBC # BLD: 0 /100 WBCS — SIGNIFICANT CHANGE UP (ref 0–0)
ORGANISM # SPEC MICROSCOPIC CNT: SIGNIFICANT CHANGE UP
ORGANISM # SPEC MICROSCOPIC CNT: SIGNIFICANT CHANGE UP
PLATELET # BLD AUTO: 209 K/UL — SIGNIFICANT CHANGE UP (ref 150–400)
PLATELET # BLD AUTO: 238 K/UL — SIGNIFICANT CHANGE UP (ref 150–400)
RBC # BLD: 4.19 M/UL — LOW (ref 4.2–5.8)
RBC # BLD: 4.47 M/UL — SIGNIFICANT CHANGE UP (ref 4.2–5.8)
RBC # FLD: 13 % — SIGNIFICANT CHANGE UP (ref 10.3–14.5)
RBC # FLD: 13.1 % — SIGNIFICANT CHANGE UP (ref 10.3–14.5)
SPECIMEN SOURCE: SIGNIFICANT CHANGE UP
TROPONIN I SERPL-MCNC: 3.35 NG/ML — HIGH (ref 0–0.04)
WBC # BLD: 11.35 K/UL — HIGH (ref 3.8–10.5)
WBC # BLD: 8.55 K/UL — SIGNIFICANT CHANGE UP (ref 3.8–10.5)
WBC # FLD AUTO: 11.35 K/UL — HIGH (ref 3.8–10.5)
WBC # FLD AUTO: 8.55 K/UL — SIGNIFICANT CHANGE UP (ref 3.8–10.5)

## 2021-08-10 RX ORDER — INSULIN GLARGINE 100 [IU]/ML
22 INJECTION, SOLUTION SUBCUTANEOUS AT BEDTIME
Refills: 0 | Status: DISCONTINUED | OUTPATIENT
Start: 2021-08-10 | End: 2021-08-11

## 2021-08-10 RX ORDER — DIPHENHYDRAMINE HCL 50 MG
50 CAPSULE ORAL ONCE
Refills: 0 | Status: COMPLETED | OUTPATIENT
Start: 2021-08-11 | End: 2021-08-11

## 2021-08-10 RX ORDER — FUROSEMIDE 40 MG
20 TABLET ORAL ONCE
Refills: 0 | Status: COMPLETED | OUTPATIENT
Start: 2021-08-10 | End: 2021-08-10

## 2021-08-10 RX ORDER — INSULIN LISPRO 100/ML
10 VIAL (ML) SUBCUTANEOUS
Refills: 0 | Status: DISCONTINUED | OUTPATIENT
Start: 2021-08-10 | End: 2021-08-11

## 2021-08-10 RX ADMIN — Medication 50 MILLIGRAM(S): at 18:03

## 2021-08-10 RX ADMIN — Medication 10 UNIT(S): at 17:25

## 2021-08-10 RX ADMIN — BUDESONIDE AND FORMOTEROL FUMARATE DIHYDRATE 2 PUFF(S): 160; 4.5 AEROSOL RESPIRATORY (INHALATION) at 21:09

## 2021-08-10 RX ADMIN — Medication 3 MILLILITER(S): at 14:29

## 2021-08-10 RX ADMIN — Medication 1 PATCH: at 12:59

## 2021-08-10 RX ADMIN — Medication 12: at 08:17

## 2021-08-10 RX ADMIN — Medication 4: at 21:09

## 2021-08-10 RX ADMIN — HEPARIN SODIUM 1250 UNIT(S)/HR: 5000 INJECTION INTRAVENOUS; SUBCUTANEOUS at 07:49

## 2021-08-10 RX ADMIN — Medication 12: at 17:25

## 2021-08-10 RX ADMIN — Medication 25 MILLIGRAM(S): at 17:26

## 2021-08-10 RX ADMIN — Medication 1 PATCH: at 19:33

## 2021-08-10 RX ADMIN — PANTOPRAZOLE SODIUM 40 MILLIGRAM(S): 20 TABLET, DELAYED RELEASE ORAL at 06:06

## 2021-08-10 RX ADMIN — Medication 25 MILLIGRAM(S): at 06:06

## 2021-08-10 RX ADMIN — CEFTRIAXONE 100 MILLIGRAM(S): 500 INJECTION, POWDER, FOR SOLUTION INTRAMUSCULAR; INTRAVENOUS at 12:10

## 2021-08-10 RX ADMIN — Medication 10 UNIT(S): at 12:09

## 2021-08-10 RX ADMIN — LISINOPRIL 40 MILLIGRAM(S): 2.5 TABLET ORAL at 06:06

## 2021-08-10 RX ADMIN — CLOPIDOGREL BISULFATE 75 MILLIGRAM(S): 75 TABLET, FILM COATED ORAL at 12:08

## 2021-08-10 RX ADMIN — BUDESONIDE AND FORMOTEROL FUMARATE DIHYDRATE 2 PUFF(S): 160; 4.5 AEROSOL RESPIRATORY (INHALATION) at 12:58

## 2021-08-10 RX ADMIN — Medication 40 MILLIGRAM(S): at 06:06

## 2021-08-10 RX ADMIN — Medication 3 MILLILITER(S): at 08:44

## 2021-08-10 RX ADMIN — Medication 3 UNIT(S): at 08:18

## 2021-08-10 RX ADMIN — Medication 10: at 12:09

## 2021-08-10 RX ADMIN — Medication 20 MILLIGRAM(S): at 08:46

## 2021-08-10 RX ADMIN — INSULIN GLARGINE 22 UNIT(S): 100 INJECTION, SOLUTION SUBCUTANEOUS at 21:04

## 2021-08-10 RX ADMIN — AZITHROMYCIN 255 MILLIGRAM(S): 500 TABLET, FILM COATED ORAL at 12:10

## 2021-08-10 RX ADMIN — ALBUTEROL 2 PUFF(S): 90 AEROSOL, METERED ORAL at 12:09

## 2021-08-10 RX ADMIN — Medication 1 PATCH: at 13:10

## 2021-08-10 RX ADMIN — Medication 1 PATCH: at 06:06

## 2021-08-10 RX ADMIN — ISOSORBIDE MONONITRATE 30 MILLIGRAM(S): 60 TABLET, EXTENDED RELEASE ORAL at 12:08

## 2021-08-10 RX ADMIN — Medication 81 MILLIGRAM(S): at 12:08

## 2021-08-10 RX ADMIN — HEPARIN SODIUM 1400 UNIT(S)/HR: 5000 INJECTION INTRAVENOUS; SUBCUTANEOUS at 21:20

## 2021-08-10 RX ADMIN — Medication 3 MILLILITER(S): at 20:31

## 2021-08-10 RX ADMIN — HEPARIN SODIUM 1100 UNIT(S)/HR: 5000 INJECTION INTRAVENOUS; SUBCUTANEOUS at 00:42

## 2021-08-10 NOTE — PROGRESS NOTE ADULT - PROBLEM SELECTOR PLAN 3
Patient is a 60 y/o Male  and Active smoker presenting to the ED due to shortness of breath since early this morning. Pt also associated with cough and minimal sputum production  Exam: 90% sats on 5 L, expiratory rhonchi and accessory muscle use, Trop uptrended to 5.3    Situation improved after duoneb and steroid    -treat COPD and PNA
Patient is a 60 y/o Male  and Active smoker presenting to the ED due to shortness of breath since early this morning. Pt also associated with cough and minimal sputum production  Exam: 90% sats on 5 L, expiratory rhonchi and accessory muscle use, Trop uptrended to 5.3    Situation improved after duoneb and steroid    -treat COPD and PNA

## 2021-08-10 NOTE — PROGRESS NOTE ADULT - PROBLEM SELECTOR PLAN 7
Uses insulin at home, on insulin in patient    -c/w sliding scale  -may need more per steroid use
Uses insulin at home, on insulin in patient    -c/w sliding scale  -may need more per steroid use  -Sliding scale readjusted

## 2021-08-10 NOTE — PROGRESS NOTE ADULT - PROBLEM SELECTOR PLAN 1
Patient is a 60 y/o Male , and Active smoker presenting to the ED due to shortness of breath since early this morning. Pt also associated with cough and minimal sputum production. CT Angio showing moderate emphysema, moderate pulm HTN   Trop 0.973->5 Possible ischemia in inferior leads  Exam: 90% sats on 5 L, expiratory rhonchi and accessory muscle use, Trop uptrended to 5.3 abG has CO2 30 and PO2 of 60    -started duoneb q6  -Solumedrol  -Will need symbicort and steroid taper  -F/u with pulm  -May need desat study to determine 02 for home if needed
Patient is a 62 y/o Male , and Active smoker presenting to the ED due to shortness of breath since early this morning. Pt also associated with cough and minimal sputum production. CT Angio showing moderate emphysema, moderate pulm HTN   Trop 0.973->5 this admission and now down trended to 3. Possible ischemia in inferior leads on EKG on admission.  Exam: 95% sats on 3 L, expiratory rhonchi-decreased, Trop uptrended to 5.3 now down trended     -c/w duoneb q6  -PO pred  -Will need symbicort and steroid taper for home  -F/u with pulm  -May need desat study to determine 02 for home if needed

## 2021-08-10 NOTE — PROGRESS NOTE ADULT - ASSESSMENT
seen and examined  on chair comfortable  not in any distress.  no cp or sobor palp.    as per him breathing much better  vsstable afebrile  lungs  a/e little better  labs noted  trop trend down wbc 11.35  a/p  NSTTMI  was supposed to be transferred to [facility] today  but pt had some minimal issues with iv dye   so being medicated   on iv heparin  transfer in am Patient is a 62 y/o Male with medical hx significant for HTN, HLD, DM, CAD with triple vessel bypass, PAD s/o Right LE stent, and Active smoker who came in for SOB on ADLs. Cxray showed evidence of increased markings in RLL, CT Angio showing moderate emphysema, moderate pulm HTN. Patient is being treated for new COPD with exacerbation, and Respiratory status has improved with Duoneb and steroids.  Better, now on sats now 95 on 3 L Physical exam shows some mild wheezes, Patient has MDI.  He is aggreable to smoking cessation  No new complaints     Trop on admission 0.973-5 now downtrended to 3, no ST elevations on admission.  S/p Full dose Lovenox  and then heparin drip with the plan to transfer to [facility] for cath        However, BCx positve for gram positive cocci in clusters- coagulase negative.

## 2021-08-10 NOTE — PROGRESS NOTE ADULT - PROBLEM SELECTOR PLAN 2
Patient is a 60 y/o Male  and Active smoker presenting to the ED due to shortness of breath since early this morning. Pt also associated with cough and minimal sputum production  In the ED, pt's Cxray showed evidence of increased markings in RLL. Respiratory status has improved, however BCx 8/8/2021 on admission, now positive for gram positive cocci in clusters. coagulase negative- likely contaminated.    -ID consulted to rule out true bacteremia  - Azithro for 7 days finish 9/15  -Ceftriaxone 5 days finish 9/13  -repeat Bcx
Patient is a 60 y/o Male  and Active smoker presenting to the ED due to shortness of breath since early this morning. Pt also associated with cough and minimal sputum production  In the ED, pt's Cxray showed evidence of increased markings in RLL  Exam: 90% sats on 5 L, expiratory rhonchi and accessory muscle use, Trop uptrended to 5.3    - Azithro for 7 days finish 9/15  -Ceftriaxone 5 days finish 9/13

## 2021-08-10 NOTE — ACUTE INTERFACILITY TRANSFER NOTE - PLAN OF CARE
This is a 61 y o male who came in for SOB and some "chest tightness" EKG is shows possible inferior ischemia but now ST elevations, the Troponin trended up to from 0.9- 5 this admission. CT showed ephysema and possible pHTN, while CXR showed RLL infiltrate. You came in with SOB and some "chest tightness" EKG showed possible inferior ischemia but no ST elevations. Your cardiac enzymes were trended up  from 0.9- 5 this admission.   You are getting transferred to [facility] for cardiac cath.

## 2021-08-10 NOTE — PROGRESS NOTE ADULT - PROBLEM SELECTOR PLAN 8
RISK                                                          Points  [] Previous VTE                                           3  [] Thrombophilia                                        2  [] Lower limb paralysis                              2   [] Current Cancer                                       2   [x] Immobilization > 24 hrs                        1  [] ICU/CCU stay > 24 hours                       1  [x] Age > 60                                                   1
RISK                                                          Points  [] Previous VTE                                           3  [] Thrombophilia                                        2  [] Lower limb paralysis                              2   [] Current Cancer                                       2   [x] Immobilization > 24 hrs                        1  [] ICU/CCU stay > 24 hours                       1  [x] Age > 60                                                   1    on heparin

## 2021-08-10 NOTE — CONSULT NOTE ADULT - SUBJECTIVE AND OBJECTIVE BOX
Patient is a 61y old  Male who presents wit Male with medical hx significant for HTN, HLD, DM, CAD with triple vessel bypass, PAD s/o Right LE stent, and Active smoker,  presents to the ER for evaluation of chest tightness and shortness of breath. Initially Patient attributed it to his hx of Anxiety. He and his wife attended a gathering with about 30 people in close proximity , both are vaccinated. No hx of sick contacts, fevers, leg swelling, palpitations. In the ER, the Cxray shows evidence of increased markings in RLL, and CT Angio shows moderate emphysema, pulm HTN and elevated Trop 0.973-> 2.040, no ischemic changes. He is S/p Full dose Lovenox  and then heparin drip with the plan to transfer to [facility] for cardiac cath. Today, The ID consult requested after blood culture came back positive, to assist with further evaluation and antibiotic management.       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 ago  S/P CABG x 3  2014 with Dr Oshea        SOCIAL HISTORY  Alcohol: Does not drink  Tobacco: Does not smoke  Illicit substance use: None      FAMILY HISTORY: Non contributory to the present illness        iodinated radiocontrast agents (Rhinitis)  penicillin (Short breath; Rash)  penicillins (Anaphylaxis)        Vital Signs Last 24 Hrs  T(C): 36.8 (10 Aug 2021 15:48), Max: 37.8 (09 Aug 2021 19:23)  T(F): 98.3 (10 Aug 2021 15:48), Max: 100.1 (09 Aug 2021 19:23)  HR: 83 (10 Aug 2021 15:48) (74 - 92)  BP: 120/47 (10 Aug 2021 15:48) (120/47 - 132/63)  BP(mean): --  RR: 18 (10 Aug 2021 15:48) (17 - 18)  SpO2: 97% (10 Aug 2021 15:48) (92% - 100%)      PHYSICAL EXAM:  GENERAL: Not in distress   CHEST/LUNG: Not using accessory muscles   HEART: s1 and s2 present  ABDOMEN:  Nontender and  Nondistended  EXTREMITIES: No pedal  edema  CNS: Awake and Alert      LABS:                        13.7   11.35 )-----------( 238      ( 10 Aug 2021 07:21 )             41.1       08-09    139  |  104  |  26<H>  ----------------------------<  294<H>  4.9   |  25  |  1.15    Ca    9.4      09 Aug 2021 08:47    PTT - ( 10 Aug 2021 14:14 )  PTT:51.9 sec        CAPILLARY BLOOD GLUCOSE  POCT Blood Glucose.: 477 mg/dL (10 Aug 2021 16:30)  POCT Blood Glucose.: 397 mg/dL (10 Aug 2021 11:53)  POCT Blood Glucose.: 431 mg/dL (10 Aug 2021 08:01)  POCT Blood Glucose.: 435 mg/dL (10 Aug 2021 05:20)  POCT Blood Glucose.: 378 mg/dL (09 Aug 2021 21:00)    ABG - ( 09 Aug 2021 11:34 )  pH, Arterial: 7.45  pH, Blood: x     /  pCO2: 31    /  pO2: 62    / HCO3: 22    / Base Excess: -1.6  /  SaO2: 94          MEDICATIONS  (STANDING):  albuterol/ipratropium for Nebulization 3 milliLiter(s) Nebulizer every 6 hours  aspirin enteric coated 81 milliGRAM(s) Oral daily  azithromycin  IVPB 500 milliGRAM(s) IV Intermittent every 24 hours  budesonide  80 MICROgram(s)/formoterol 4.5 MICROgram(s) Inhaler 2 Puff(s) Inhalation two times a day  cefTRIAXone   IVPB 1000 milliGRAM(s) IV Intermittent every 24 hours  clopidogrel Tablet 75 milliGRAM(s) Oral daily  heparin  Infusion.  Unit(s)/Hr (9.5 mL/Hr) IV Continuous <Continuous>  insulin glargine Injectable (LANTUS) 22 Unit(s) SubCutaneous at bedtime  insulin lispro (ADMELOG) corrective regimen sliding scale   SubCutaneous at bedtime  insulin lispro (ADMELOG) corrective regimen sliding scale   SubCutaneous Before meals and at bedtime  insulin lispro Injectable (ADMELOG) 10 Unit(s) SubCutaneous three times a day before meals  isosorbide   mononitrate ER Tablet (IMDUR) 30 milliGRAM(s) Oral daily  lisinopril 40 milliGRAM(s) Oral daily  metoprolol tartrate 25 milliGRAM(s) Oral two times a day  nicotine -  14 mG/24Hr(s) Patch 1 patch Transdermal daily  pantoprazole    Tablet 40 milliGRAM(s) Oral before breakfast  predniSONE   Tablet 50 milliGRAM(s) Oral once  predniSONE   Tablet 50 milliGRAM(s) Oral once    MEDICATIONS  (PRN):  ALBUTerol    90 MICROgram(s) HFA Inhaler 2 Puff(s) Inhalation every 6 hours PRN Shortness of Breath and/or Wheezing        RADIOLOGY & ADDITIONAL TESTS:    8/8/21: CT Angio Chest PE Protocol w/ IV Cont (08.08.21 @ 13:23) No pulmonary embolus. Moderately severe emphysema.  Enlarged main pulmonary artery suggesting pulmonary arterial hypertension.  Appearance suggesting pulmonaryinterstitial edema        MICROBIOLOGY DATA:    COVID-19 Hamilton Domain Antibody (08.09.21 @ 13:59)   COVID-19 Hamilton Domain Antibody Result: >250.00:    Culture - Blood (08.09.21 @ 06:35)   Specimen Source: .Blood Blood-Peripheral   Culture Results:   No growth to date.     Culture - Blood (08.09.21 @ 06:35)   Gram Stain:   Growth in aerobic bottle: Gram Positive Cocci in Clusters   - Coagulase negative Staphylococcus: Detec   Specimen Source: .Blood Blood-Peripheral   Organism: Blood Culture PCR   Culture Results:   Growth in aerobic bottle: Gram Positive Cocci in Clusters                Patient is a 61y old  Male who presents wit Male with medical hx significant for HTN, HLD, DM, CAD with triple vessel bypass, PAD s/o Right LE stent, and Active smoker,  presents to the ER for evaluation of chest tightness and shortness of breath. Initially Patient attributed it to his hx of Anxiety. He and his wife attended a gathering with about 30 people in close proximity , both are vaccinated. No hx of sick contacts, fevers, leg swelling, palpitations. In the ER, the Cxray shows evidence of increased markings in RLL, and CT Angio shows moderate emphysema, pulm HTN and elevated Trop 0.973-> 2.040, no ischemic changes. He is S/p Full dose Lovenox  and then heparin drip with the plan to transfer to [facility] for cardiac cath. Today, The ID consult requested after blood culture came back positive, to assist with further evaluation and antibiotic management.       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 ago  S/P CABG x 3  2014 with Dr Oshea        SOCIAL HISTORY  Alcohol: Does not drink  Tobacco: Does not smoke  Illicit substance use: None      FAMILY HISTORY: Non contributory to the present illness      iodinated radiocontrast agents (Rhinitis)  penicillin (Short breath; Rash)  penicillins (Anaphylaxis)      Vital Signs Last 24 Hrs  T(C): 36.8 (10 Aug 2021 15:48), Max: 37.8 (09 Aug 2021 19:23)  T(F): 98.3 (10 Aug 2021 15:48), Max: 100.1 (09 Aug 2021 19:23)  HR: 83 (10 Aug 2021 15:48) (74 - 92)  BP: 120/47 (10 Aug 2021 15:48) (120/47 - 132/63)  BP(mean): --  RR: 18 (10 Aug 2021 15:48) (17 - 18)  SpO2: 97% (10 Aug 2021 15:48) (92% - 100%)      PHYSICAL EXAM:  GENERAL: Not in distress, on oxygen via NC  CHEST/LUNG: Not using accessory muscles   HEART: s1 and s2 present  ABDOMEN:  Nontender and  Nondistended  EXTREMITIES: No pedal  edema  CNS: Awake and Alert      LABS:                        13.7   11.35 )-----------( 238      ( 10 Aug 2021 07:21 )             41.1       08-09    139  |  104  |  26<H>  ----------------------------<  294<H>  4.9   |  25  |  1.15    Ca    9.4      09 Aug 2021 08:47    PTT - ( 10 Aug 2021 14:14 )  PTT:51.9 sec        CAPILLARY BLOOD GLUCOSE  POCT Blood Glucose.: 477 mg/dL (10 Aug 2021 16:30)  POCT Blood Glucose.: 397 mg/dL (10 Aug 2021 11:53)  POCT Blood Glucose.: 431 mg/dL (10 Aug 2021 08:01)  POCT Blood Glucose.: 435 mg/dL (10 Aug 2021 05:20)  POCT Blood Glucose.: 378 mg/dL (09 Aug 2021 21:00)    ABG - ( 09 Aug 2021 11:34 )  pH, Arterial: 7.45  pH, Blood: x     /  pCO2: 31    /  pO2: 62    / HCO3: 22    / Base Excess: -1.6  /  SaO2: 94          MEDICATIONS  (STANDING):  albuterol/ipratropium for Nebulization 3 milliLiter(s) Nebulizer every 6 hours  aspirin enteric coated 81 milliGRAM(s) Oral daily  azithromycin  IVPB 500 milliGRAM(s) IV Intermittent every 24 hours  budesonide  80 MICROgram(s)/formoterol 4.5 MICROgram(s) Inhaler 2 Puff(s) Inhalation two times a day  cefTRIAXone   IVPB 1000 milliGRAM(s) IV Intermittent every 24 hours  clopidogrel Tablet 75 milliGRAM(s) Oral daily  heparin  Infusion.  Unit(s)/Hr (9.5 mL/Hr) IV Continuous <Continuous>  insulin glargine Injectable (LANTUS) 22 Unit(s) SubCutaneous at bedtime  insulin lispro (ADMELOG) corrective regimen sliding scale   SubCutaneous at bedtime  insulin lispro (ADMELOG) corrective regimen sliding scale   SubCutaneous Before meals and at bedtime  insulin lispro Injectable (ADMELOG) 10 Unit(s) SubCutaneous three times a day before meals  isosorbide   mononitrate ER Tablet (IMDUR) 30 milliGRAM(s) Oral daily  lisinopril 40 milliGRAM(s) Oral daily  metoprolol tartrate 25 milliGRAM(s) Oral two times a day  nicotine -  14 mG/24Hr(s) Patch 1 patch Transdermal daily  pantoprazole    Tablet 40 milliGRAM(s) Oral before breakfast  predniSONE   Tablet 50 milliGRAM(s) Oral once  predniSONE   Tablet 50 milliGRAM(s) Oral once    MEDICATIONS  (PRN):  ALBUTerol    90 MICROgram(s) HFA Inhaler 2 Puff(s) Inhalation every 6 hours PRN Shortness of Breath and/or Wheezing        RADIOLOGY & ADDITIONAL TESTS:    8/8/21: CT Angio Chest PE Protocol w/ IV Cont (08.08.21 @ 13:23) No pulmonary embolus. Moderately severe emphysema.  Enlarged main pulmonary artery suggesting pulmonary arterial hypertension.  Appearance suggesting pulmonaryinterstitial edema        MICROBIOLOGY DATA:    COVID-19 Hamilton Domain Antibody (08.09.21 @ 13:59)   COVID-19 Hamilton Domain Antibody Result: >250.00:    Culture - Blood (08.09.21 @ 06:35)   Specimen Source: .Blood Blood-Peripheral   Culture Results:   No growth to date.     Culture - Blood (08.09.21 @ 06:35)   Gram Stain:   Growth in aerobic bottle: Gram Positive Cocci in Clusters   - Coagulase negative Staphylococcus: Detec   Specimen Source: .Blood Blood-Peripheral   Organism: Blood Culture PCR   Culture Results:   Growth in aerobic bottle: Gram Positive Cocci in Clusters

## 2021-08-10 NOTE — PROGRESS NOTE ADULT - SUBJECTIVE AND OBJECTIVE BOX
CHIEF COMPLAINT:Patient is a 61y old  Male who presents with a chief complaint of SOB.Pt feeling better.    	  REVIEW OF SYSTEMS:  CONSTITUTIONAL: No fever, weight loss, or fatigue  EYES: No eye pain, visual disturbances, or discharge  ENT:  No difficulty hearing, tinnitus, vertigo; No sinus or throat pain  NECK: No pain or stiffness  RESPIRATORY: No cough, wheezing, chills or hemoptysis; No Shortness of Breath  CARDIOVASCULAR: No chest pain, palpitations, passing out, dizziness, or leg swelling  GASTROINTESTINAL: No abdominal or epigastric pain. No nausea, vomiting, or hematemesis; No diarrhea or constipation. No melena or hematochezia.  GENITOURINARY: No dysuria, frequency, hematuria, or incontinence  NEUROLOGICAL: No headaches, memory loss, loss of strength, numbness, or tremors  SKIN: No itching, burning, rashes, or lesions   LYMPH Nodes: No enlarged glands  ENDOCRINE: No heat or cold intolerance; No hair loss  MUSCULOSKELETAL: No joint pain or swelling; No muscle, back, or extremity pain  PSYCHIATRIC: No depression, anxiety, mood swings, or difficulty sleeping  HEME/LYMPH: No easy bruising, or bleeding gums  ALLERGY AND IMMUNOLOGIC: No hives or eczema	      PHYSICAL EXAM:  T(C): 36.4 (08-10-21 @ 05:27), Max: 37.8 (08-09-21 @ 19:23)  HR: 92 (08-10-21 @ 05:27) (77 - 92)  BP: 131/60 (08-10-21 @ 05:27) (120/54 - 131/60)  RR: 18 (08-10-21 @ 04:38) (17 - 18)  SpO2: 97% (08-10-21 @ 05:27) (92% - 100%)  Wt(kg): --  I&O's Summary    09 Aug 2021 07:01  -  10 Aug 2021 07:00  --------------------------------------------------------  IN: 126 mL / OUT: 0 mL / NET: 126 mL        Appearance: Normal	  HEENT:   Normal oral mucosa, PERRL, EOMI	  Lymphatic: No lymphadenopathy  Cardiovascular: Normal S1 S2, No JVD, No murmurs, No edema  Respiratory: B/L [facility]  Psychiatry: A & O x 3, Mood & affect appropriate  Gastrointestinal:  Soft, Non-tender, + BS	  Skin: No rashes, No ecchymoses, No cyanosis	  Neurologic: Non-focal  Extremities: Normal range of motion, No clubbing, cyanosis or edema  Vascular: Peripheral pulses palpable 2+ bilaterally    MEDICATIONS  (STANDING):  albuterol/ipratropium for Nebulization 3 milliLiter(s) Nebulizer every 6 hours  aspirin enteric coated 81 milliGRAM(s) Oral daily  azithromycin  IVPB 500 milliGRAM(s) IV Intermittent every 24 hours  budesonide  80 MICROgram(s)/formoterol 4.5 MICROgram(s) Inhaler 2 Puff(s) Inhalation two times a day  cefTRIAXone   IVPB 1000 milliGRAM(s) IV Intermittent every 24 hours  clopidogrel Tablet 75 milliGRAM(s) Oral daily  furosemide   Injectable 20 milliGRAM(s) IV Push once  heparin  Infusion.  Unit(s)/Hr (9.5 mL/Hr) IV Continuous <Continuous>  insulin glargine Injectable (LANTUS) 10 Unit(s) SubCutaneous at bedtime  insulin lispro (ADMELOG) corrective regimen sliding scale   SubCutaneous at bedtime  insulin lispro (ADMELOG) corrective regimen sliding scale   SubCutaneous Before meals and at bedtime  insulin lispro Injectable (ADMELOG) 3 Unit(s) SubCutaneous three times a day before meals  isosorbide   mononitrate ER Tablet (IMDUR) 30 milliGRAM(s) Oral daily  lisinopril 40 milliGRAM(s) Oral daily  metoprolol tartrate 25 milliGRAM(s) Oral two times a day  nicotine -  14 mG/24Hr(s) Patch 1 patch Transdermal daily  pantoprazole    Tablet 40 milliGRAM(s) Oral before breakfast  predniSONE   Tablet 50 milliGRAM(s) Oral once  predniSONE   Tablet 50 milliGRAM(s) Oral once      TELEMETRY: 	 nsr,NSVT   	  	  LABS:	 	    CARDIAC MARKERS ( 10 Aug 2021 00:16 )  3.350 ng/mL / x     / x     / x     / x      CARDIAC MARKERS ( 09 Aug 2021 20:16 )  4.910 ng/mL / x     / x     / x     / x      CARDIAC MARKERS ( 09 Aug 2021 08:47 )  5.360 ng/mL / x     / 95 U/L / x     / 17.2 ng/mL  CARDIAC MARKERS ( 09 Aug 2021 01:04 )  5.370 ng/mL / x     / 81 U/L / x     / 17.3 ng/mL  CARDIAC MARKERS ( 08 Aug 2021 20:23 )  3.590 ng/mL / x     / x     / x     / x      CARDIAC MARKERS ( 08 Aug 2021 14:16 )  2.040 ng/mL / x     / x     / x     / x                                    13.7   11.35 )-----------( 238      ( 10 Aug 2021 07:21 )             41.1     08-09    139  |  104  |  26<H>  ----------------------------<  294<H>  4.9   |  25  |  1.15    Ca    9.4      09 Aug 2021 08:47      proBNP: Serum Pro-Brain Natriuretic Peptide: 3195 pg/mL (08-08 @ 07:21)    PTT - ( 10 Aug 2021 07:21 )  PTT:40.2 sec    	      ch< from: Transthoracic Echocardiogram (08.09.21 @ 07:12) >  OBSERVATIONS:  Mitral Valve: Mitral annular calcification. Mild mitral  regurgitation.  Aortic Root: Aortic Root: 3.3 cm.    Aortic Valve: Normal trileaflet aortic valve.  Left Atrium: Mildly dilated left atrium.  LA volume index =  37 cc/m2.  Left Ventricle: Moderate segmental left ventricular  systolic dysfunction.The distal anterior wall,apex and  distal septum are hypokinetic EF=40-45%. Normal left  ventricular internal dimensions and wall thicknesses. Grade  II diastolic dysfunction.  Right Heart: Normal right atrium. Normal right ventricular  size with decreased RV systolic function(Tapse 1.6cm,tissue  doppler .08m/s). There is trace tricuspid regurgitation.  There is moderate pulmonic regurgitation.  Pericardium/PleuraNormal pericardium with no pericardial  effusion.  Hemodynamic: RA Pressure is 8 mm Hg. RV systolic pressure  is normal at  33 mm Hg.

## 2021-08-10 NOTE — PROGRESS NOTE ADULT - PROBLEM SELECTOR PLAN 5
On amlodipine at home, today its 130s/90s before solumedrol.     -Monitor BP  -restart as needed
On amlodipine at home, today its 130s/90s before solumedrol.     -Monitor BP  -restart as needed

## 2021-08-10 NOTE — PROGRESS NOTE ADULT - PROBLEM SELECTOR PLAN 4
Patient complained of some chest tightness on presentation which has been minimal compared to the SOB, however the troponin is uptrended to 5    - monitor vitals and symptoms  - cardiac cath at [facility]  -heparin drip  -Trend trops q8
Patient complained of some chest tightness on presentation which has been minimal compared to the SOB, however the troponin is uptrended to 5 and then down trended to 3.3.    - monitor vitals and symptoms  - cardiac cath at [facility]  -heparin drip completed  -Trend trops q8

## 2021-08-10 NOTE — ACUTE INTERFACILITY TRANSFER NOTE - HOSPITAL COURSE
This is a 61 y o male who came in for SOB and some "chest tightness" EKG is shows possible inferior ischemia but now ST elevations, the Troponin trended up to from 0.9- 5 this admission. CT showed emphysema and possible pHTN, while CXR showed RLL infiltrate.  Patient is being transferred to [facility] for cardiac cath.

## 2021-08-10 NOTE — PROGRESS NOTE ADULT - ASSESSMENT
62 y/o Male with medical hx significant for HTN, HLD, DM, CAD with triple vessel bypass, PAD s/o Right LE stent, and Active smoker presenting to the ED due to shortness of breath,NSTEMI-Type I MI,NSVT.  1.Tele monitoring.  2.Echocardiogram shows SWMA and LV dysfunction.Will give lasix 20mg ivx1.  3.?contrast allergy will need premed with prednisone,cath in am.  4.NSTEMI,Type I MI-asa,plavix, heparin drip.D/W outpatient cardiologist Dr. Nicolas Price,? contrast allergy,will need premed, will set up cardiac cath for AM at [facility].  5.DM-Insulin.  6.CAD-asa,plavix,b blocker, imdur 30mg qd.  7.HTN-Ace.  8.COPD-MDI.  9.Smoking cessation.  10.GI and DVT prophylaxis.

## 2021-08-10 NOTE — PROGRESS NOTE ADULT - PROBLEM SELECTOR PROBLEM 2
Pneumonia of right lung due to infectious organism, unspecified part of lung
Pneumonia of right lung due to infectious organism, unspecified part of lung

## 2021-08-10 NOTE — PROGRESS NOTE ADULT - SUBJECTIVE AND OBJECTIVE BOX
HPI:  Patient is a 60 y/o Male with medical hx significant for HTN, HLD, DM, CAD with triple vessel bypass, PAD s/o Right LE stent, and Active smoker presenting to the ED due to shortness of breath since early this morning. He had an episode of shortness of breath on Tuesday morning, pt and pt's wife attributed it to pt's hx of Anxiety. However, today morning, he woke up around 4 am saying "I can't breath" prompting ED admission. Pt also associated with cough and minimal sputum production (clear or white in color, not blood tinged or streaked). Pt complains of chest tightness, not chest pain. Pt and pt's wife attended a gathering with about 30 people in close proximity yesterday, both pt and pt's wife are vaccinated. No hx of sick contacts, fevers, leg swelling, palpitations.  Positive cardiac hx in pt's father who passed away due to MI. Pt is able to ambulate about 4-5 blocks at baseline, however, now able to walk around the house but with difficulty.    In the ED, pt's Cxray showed evidence of increased markings in RLL  CT Angio showing moderate emphysema, moderate pulm HTN   No pulm embolus   Trop 0.973-> 2.040, no ischemic changes  S/p Full dose Lovenox (08 Aug 2021 20:08)      Patient is a 61y old  Male who presents with a chief complaint of NSTEMI (10 Aug 2021 08:17)      INTERVAL HPI/OVERNIGHT EVENTS:  T(C): 37 (08-10-21 @ 11:04), Max: 37.8 (08-09-21 @ 19:23)  HR: 76 (08-10-21 @ 11:04) (74 - 92)  BP: 132/63 (08-10-21 @ 11:04) (120/54 - 132/63)  RR: 18 (08-10-21 @ 11:04) (17 - 18)  SpO2: 95% (08-10-21 @ 11:04) (92% - 100%)  Wt(kg): --  I&O's Summary    09 Aug 2021 07:01  -  10 Aug 2021 07:00  --------------------------------------------------------  IN: 126 mL / OUT: 0 mL / NET: 126 mL        REVIEW OF SYSTEMS: denies fever, chills, SOB, palpitations, chest pain, abdominal pain, nausea, vomitting, diarrhea, constipation, dizziness    MEDICATIONS  (STANDING):  albuterol/ipratropium for Nebulization 3 milliLiter(s) Nebulizer every 6 hours  aspirin enteric coated 81 milliGRAM(s) Oral daily  azithromycin  IVPB 500 milliGRAM(s) IV Intermittent every 24 hours  budesonide  80 MICROgram(s)/formoterol 4.5 MICROgram(s) Inhaler 2 Puff(s) Inhalation two times a day  cefTRIAXone   IVPB 1000 milliGRAM(s) IV Intermittent every 24 hours  clopidogrel Tablet 75 milliGRAM(s) Oral daily  heparin  Infusion.  Unit(s)/Hr (9.5 mL/Hr) IV Continuous <Continuous>  insulin glargine Injectable (LANTUS) 22 Unit(s) SubCutaneous at bedtime  insulin lispro (ADMELOG) corrective regimen sliding scale   SubCutaneous at bedtime  insulin lispro (ADMELOG) corrective regimen sliding scale   SubCutaneous Before meals and at bedtime  insulin lispro Injectable (ADMELOG) 10 Unit(s) SubCutaneous three times a day before meals  isosorbide   mononitrate ER Tablet (IMDUR) 30 milliGRAM(s) Oral daily  lisinopril 40 milliGRAM(s) Oral daily  metoprolol tartrate 25 milliGRAM(s) Oral two times a day  nicotine -  14 mG/24Hr(s) Patch 1 patch Transdermal daily  pantoprazole    Tablet 40 milliGRAM(s) Oral before breakfast  predniSONE   Tablet 50 milliGRAM(s) Oral once  predniSONE   Tablet 50 milliGRAM(s) Oral once    MEDICATIONS  (PRN):  ALBUTerol    90 MICROgram(s) HFA Inhaler 2 Puff(s) Inhalation every 6 hours PRN Shortness of Breath and/or Wheezing      PHYSICAL EXAM:  GENERAL: NAD, well-groomed, well-developed  HEAD:  Atraumatic, Normocephalic  EYES: EOMI, PERRLA, conjunctiva and sclera clear  ENMT: No tonsillar erythema, exudates, or enlargement; Moist mucous membranes, Good dentition, No lesions  NECK: Supple, No JVD, Normal thyroid  NERVOUS SYSTEM:  Alert & Oriented X3, Good concentration; Motor Strength 5/5 B/L upper and lower extremities; DTRs 2+ intact and symmetric  CHEST/LUNG: Clear to percussion bilaterally; No rales, rhonchi, wheezing, or rubs  HEART: Regular rate and rhythm; No murmurs, rubs, or gallops  ABDOMEN: Soft, Nontender, Nondistended; Bowel sounds present  EXTREMITIES:  2+ Peripheral Pulses, No clubbing, cyanosis, or edema  LYMPH: No lymphadenopathy noted  SKIN: No rashes or lesions  LABS:                        13.7   11.35 )-----------( 238      ( 10 Aug 2021 07:21 )             41.1     08-09    139  |  104  |  26<H>  ----------------------------<  294<H>  4.9   |  25  |  1.15    Ca    9.4      09 Aug 2021 08:47      PTT - ( 10 Aug 2021 07:21 )  PTT:40.2 sec    CAPILLARY BLOOD GLUCOSE      POCT Blood Glucose.: 397 mg/dL (10 Aug 2021 11:53)  POCT Blood Glucose.: 431 mg/dL (10 Aug 2021 08:01)  POCT Blood Glucose.: 435 mg/dL (10 Aug 2021 05:20)  POCT Blood Glucose.: 378 mg/dL (09 Aug 2021 21:00)  POCT Blood Glucose.: 260 mg/dL (09 Aug 2021 16:39)  POCT Blood Glucose.: 303 mg/dL (09 Aug 2021 14:04)      ABG - ( 09 Aug 2021 11:34 )  pH, Arterial: 7.45  pH, Blood: x     /  pCO2: 31    /  pO2: 62    / HCO3: 22    / Base Excess: -1.6  /  SaO2: 94                 PGY-1 Progress Note discussed with attending    PAGER #: [--------] TILL 5:00 PM  PLEASE CONTACT ON CALL TEAM:  - On Call Team (Please refer to Levon) FROM 5:00 PM - 8:30PM  - Nightfloat Team FROM 8:30 -7:30 AM    CHIEF COMPLAINT & BRIEF HOSPITAL COURSE:    Patient is a 60 y/o Male with medical hx significant for HTN, HLD, DM, CAD with triple vessel bypass, PAD s/o Right LE stent, and Active smoker presenting to the ED due to shortness of breath since early this morning. He had an episode of shortness of breath on Tuesday morning, pt and pt's wife attributed it to pt's hx of Anxiety. Also Pt complained of chest tightness, not chest pain. Pt and pt's wife attended a gathering with about 30 people in close proximity yesterday, both pt and pt's wife are vaccinated. No hx of sick contacts, fevers, leg swelling, palpitations.  Positive cardiac hx in pt's father who passed away due to MI. Pt is able to ambulate about 4-5 blocks at baseline, however, now able to walk around the house but with difficulty.    In the ED, pt's Cxray showed evidence of increased markings in RLL  CT Angio showing moderate emphysema, moderate pulm HTN   No pulm embolus   Trop 0.973-> 2.040, no ischemic changes  S/p Full dose Lovenox (08 Aug 2021 20:08)      Patient is a 61y old  Male who presents with a chief complaint of NSTEMI (10 Aug 2021 08:17)    INTERVAL HPI/OVERNIGHT EVENTS:   MEDICATIONS  (STANDING):  albuterol/ipratropium for Nebulization 3 milliLiter(s) Nebulizer every 6 hours  aspirin enteric coated 81 milliGRAM(s) Oral daily  azithromycin  IVPB 500 milliGRAM(s) IV Intermittent every 24 hours  budesonide  80 MICROgram(s)/formoterol 4.5 MICROgram(s) Inhaler 2 Puff(s) Inhalation two times a day  cefTRIAXone   IVPB 1000 milliGRAM(s) IV Intermittent every 24 hours  clopidogrel Tablet 75 milliGRAM(s) Oral daily  heparin  Infusion.  Unit(s)/Hr (9.5 mL/Hr) IV Continuous <Continuous>  insulin glargine Injectable (LANTUS) 22 Unit(s) SubCutaneous at bedtime  insulin lispro (ADMELOG) corrective regimen sliding scale   SubCutaneous at bedtime  insulin lispro (ADMELOG) corrective regimen sliding scale   SubCutaneous Before meals and at bedtime  insulin lispro Injectable (ADMELOG) 10 Unit(s) SubCutaneous three times a day before meals  isosorbide   mononitrate ER Tablet (IMDUR) 30 milliGRAM(s) Oral daily  lisinopril 40 milliGRAM(s) Oral daily  metoprolol tartrate 25 milliGRAM(s) Oral two times a day  nicotine -  14 mG/24Hr(s) Patch 1 patch Transdermal daily  pantoprazole    Tablet 40 milliGRAM(s) Oral before breakfast  predniSONE   Tablet 50 milliGRAM(s) Oral once  predniSONE   Tablet 50 milliGRAM(s) Oral once    MEDICATIONS  (PRN):  ALBUTerol    90 MICROgram(s) HFA Inhaler 2 Puff(s) Inhalation every 6 hours PRN Shortness of Breath and/or Wheezing      REVIEW OF SYSTEMS:  CONSTITUTIONAL: No fever, weight loss, or fatigue  RESPIRATORY: No cough, wheezing, chills or hemoptysis; No shortness of breath  CARDIOVASCULAR: No chest pain, palpitations, dizziness, or leg swelling  GASTROINTESTINAL: No abdominal pain. No nausea, vomiting, or hematemesis; No diarrhea or constipation. No melena or hematochezia.  GENITOURINARY: No dysuria or hematuria, urinary frequency  NEUROLOGICAL: No headaches, memory loss, loss of strength, numbness, or tremors  SKIN: No itching, burning, rashes, or lesions     Vital Signs Last 24 Hrs  T(C): 37 (10 Aug 2021 11:04), Max: 37.8 (09 Aug 2021 19:23)  T(F): 98.6 (10 Aug 2021 11:04), Max: 100.1 (09 Aug 2021 19:23)  HR: 76 (10 Aug 2021 11:04) (74 - 92)  BP: 132/63 (10 Aug 2021 11:04) (120/54 - 132/63)  BP(mean): --  RR: 18 (10 Aug 2021 11:04) (17 - 18)  SpO2: 95% (10 Aug 2021 11:04) (92% - 100%)    PHYSICAL EXAMINATION:  GENERAL: NAD, well built  HEAD:  Atraumatic, Normocephalic  EYES:  conjunctiva and sclera clear  NECK: Supple, No JVD, Normal thyroid  CHEST/LUNG: Clear to auscultation. Clear to percussion bilaterally; No rales, rhonchi, wheezing, or rubs  HEART: Regular rate and rhythm; No murmurs, rubs, or gallops  ABDOMEN: Soft, Nontender, Nondistended; Bowel sounds present  NERVOUS SYSTEM:  Alert & Oriented X3,    EXTREMITIES:  2+ Peripheral Pulses, No clubbing, cyanosis, or edema  SKIN: warm dry                          13.7   11.35 )-----------( 238      ( 10 Aug 2021 07:21 )             41.1     08-09    139  |  104  |  26<H>  ----------------------------<  294<H>  4.9   |  25  |  1.15    Ca    9.4      09 Aug 2021 08:47        CARDIAC MARKERS ( 10 Aug 2021 00:16 )  3.350 ng/mL / x     / x     / x     / x      CARDIAC MARKERS ( 09 Aug 2021 20:16 )  4.910 ng/mL / x     / x     / x     / x      CARDIAC MARKERS ( 09 Aug 2021 08:47 )  5.360 ng/mL / x     / 95 U/L / x     / 17.2 ng/mL  CARDIAC MARKERS ( 09 Aug 2021 01:04 )  5.370 ng/mL / x     / 81 U/L / x     / 17.3 ng/mL  CARDIAC MARKERS ( 08 Aug 2021 20:23 )  3.590 ng/mL / x     / x     / x     / x          PTT - ( 10 Aug 2021 14:14 )  PTT:51.9 sec    CAPILLARY BLOOD GLUCOSE      RADIOLOGY & ADDITIONAL TESTS:                        INTERVAL HPI/OVERNIGHT EVENTS:  T(C): 37 (08-10-21 @ 11:04), Max: 37.8 (08-09-21 @ 19:23)  HR: 76 (08-10-21 @ 11:04) (74 - 92)  BP: 132/63 (08-10-21 @ 11:04) (120/54 - 132/63)  RR: 18 (08-10-21 @ 11:04) (17 - 18)  SpO2: 95% (08-10-21 @ 11:04) (92% - 100%)  Wt(kg): --  I&O's Summary    09 Aug 2021 07:01  -  10 Aug 2021 07:00  --------------------------------------------------------  IN: 126 mL / OUT: 0 mL / NET: 126 mL        REVIEW OF SYSTEMS: denies fever, chills, SOB, palpitations, chest pain, abdominal pain, nausea, vomitting, diarrhea, constipation, dizziness    MEDICATIONS  (STANDING):  albuterol/ipratropium for Nebulization 3 milliLiter(s) Nebulizer every 6 hours  aspirin enteric coated 81 milliGRAM(s) Oral daily  azithromycin  IVPB 500 milliGRAM(s) IV Intermittent every 24 hours  budesonide  80 MICROgram(s)/formoterol 4.5 MICROgram(s) Inhaler 2 Puff(s) Inhalation two times a day  cefTRIAXone   IVPB 1000 milliGRAM(s) IV Intermittent every 24 hours  clopidogrel Tablet 75 milliGRAM(s) Oral daily  heparin  Infusion.  Unit(s)/Hr (9.5 mL/Hr) IV Continuous <Continuous>  insulin glargine Injectable (LANTUS) 22 Unit(s) SubCutaneous at bedtime  insulin lispro (ADMELOG) corrective regimen sliding scale   SubCutaneous at bedtime  insulin lispro (ADMELOG) corrective regimen sliding scale   SubCutaneous Before meals and at bedtime  insulin lispro Injectable (ADMELOG) 10 Unit(s) SubCutaneous three times a day before meals  isosorbide   mononitrate ER Tablet (IMDUR) 30 milliGRAM(s) Oral daily  lisinopril 40 milliGRAM(s) Oral daily  metoprolol tartrate 25 milliGRAM(s) Oral two times a day  nicotine -  14 mG/24Hr(s) Patch 1 patch Transdermal daily  pantoprazole    Tablet 40 milliGRAM(s) Oral before breakfast  predniSONE   Tablet 50 milliGRAM(s) Oral once  predniSONE   Tablet 50 milliGRAM(s) Oral once    MEDICATIONS  (PRN):  ALBUTerol    90 MICROgram(s) HFA Inhaler 2 Puff(s) Inhalation every 6 hours PRN Shortness of Breath and/or Wheezing      PHYSICAL EXAM:  GENERAL: NAD, well-groomed, well-developed  HEAD:  Atraumatic, Normocephalic  EYES: EOMI, PERRLA, conjunctiva and sclera clear  ENMT: No tonsillar erythema, exudates, or enlargement; Moist mucous membranes, Good dentition, No lesions  NECK: Supple, No JVD, Normal thyroid  NERVOUS SYSTEM:  Alert & Oriented X3, Good concentration; Motor Strength 5/5 B/L upper and lower extremities; DTRs 2+ intact and symmetric  CHEST/LUNG: Clear to percussion bilaterally; No rales, rhonchi, wheezing, or rubs  HEART: Regular rate and rhythm; No murmurs, rubs, or gallops  ABDOMEN: Soft, Nontender, Nondistended; Bowel sounds present  EXTREMITIES:  2+ Peripheral Pulses, No clubbing, cyanosis, or edema  LYMPH: No lymphadenopathy noted  SKIN: No rashes or lesions  LABS:                        13.7   11.35 )-----------( 238      ( 10 Aug 2021 07:21 )             41.1     08-09    139  |  104  |  26<H>  ----------------------------<  294<H>  4.9   |  25  |  1.15    Ca    9.4      09 Aug 2021 08:47      PTT - ( 10 Aug 2021 07:21 )  PTT:40.2 sec    CAPILLARY BLOOD GLUCOSE      POCT Blood Glucose.: 397 mg/dL (10 Aug 2021 11:53)  POCT Blood Glucose.: 431 mg/dL (10 Aug 2021 08:01)  POCT Blood Glucose.: 435 mg/dL (10 Aug 2021 05:20)  POCT Blood Glucose.: 378 mg/dL (09 Aug 2021 21:00)  POCT Blood Glucose.: 260 mg/dL (09 Aug 2021 16:39)  POCT Blood Glucose.: 303 mg/dL (09 Aug 2021 14:04)      ABG - ( 09 Aug 2021 11:34 )  pH, Arterial: 7.45  pH, Blood: x     /  pCO2: 31    /  pO2: 62    / HCO3: 22    / Base Excess: -1.6  /  SaO2: 94                 PGY-1 Progress Note discussed with attending    PAGER #: [--------] TILL 5:00 PM  PLEASE CONTACT ON CALL TEAM:  - On Call Team (Please refer to Levon) FROM 5:00 PM - 8:30PM  - Nightfloat Team FROM 8:30 -7:30 AM    CHIEF COMPLAINT & BRIEF HOSPITAL COURSE:    Patient is a 62 y/o Male with medical hx significant for HTN, HLD, DM, CAD with triple vessel bypass, PAD s/o Right LE stent, and Active smoker presenting to the ED due to shortness of breath since early this morning. He had an episode of shortness of breath on Tuesday morning, pt and pt's wife attributed it to pt's hx of Anxiety. Also Pt complained of chest tightness, not chest pain. Pt and pt's wife attended a gathering with about 30 people in close proximity yesterday, both pt and pt's wife are vaccinated. No hx of sick contacts, fevers, leg swelling, palpitations.    In the ED, pt's Cxray showed evidence of increased markings in RLL  CT Angio showing moderate emphysema, moderate pulm HTN   Trop 0.973-> 2.040, no ischemic changes  S/p Full dose Lovenox  and then heparin drip with the plan to transfer [facility]    Patient is being treated for new COPD with exacerbation, and Respiratory status has improved with Duonebs and steroids.    INTERVAL HPI/OVERNIGHT EVENTS:   Better, now on sats now 95 on 3 L  He is aggreable to smoking cessation  No new complaints  However, BCx positve for gram positive cocci in clusters- coagulase negative.    MEDICATIONS  (STANDING):  albuterol/ipratropium for Nebulization 3 milliLiter(s) Nebulizer every 6 hours  aspirin enteric coated 81 milliGRAM(s) Oral daily  azithromycin  IVPB 500 milliGRAM(s) IV Intermittent every 24 hours  budesonide  80 MICROgram(s)/formoterol 4.5 MICROgram(s) Inhaler 2 Puff(s) Inhalation two times a day  cefTRIAXone   IVPB 1000 milliGRAM(s) IV Intermittent every 24 hours  clopidogrel Tablet 75 milliGRAM(s) Oral daily  heparin  Infusion.  Unit(s)/Hr (9.5 mL/Hr) IV Continuous <Continuous>  insulin glargine Injectable (LANTUS) 22 Unit(s) SubCutaneous at bedtime  insulin lispro (ADMELOG) corrective regimen sliding scale   SubCutaneous at bedtime  insulin lispro (ADMELOG) corrective regimen sliding scale   SubCutaneous Before meals and at bedtime  insulin lispro Injectable (ADMELOG) 10 Unit(s) SubCutaneous three times a day before meals  isosorbide   mononitrate ER Tablet (IMDUR) 30 milliGRAM(s) Oral daily  lisinopril 40 milliGRAM(s) Oral daily  metoprolol tartrate 25 milliGRAM(s) Oral two times a day  nicotine -  14 mG/24Hr(s) Patch 1 patch Transdermal daily  pantoprazole    Tablet 40 milliGRAM(s) Oral before breakfast  predniSONE   Tablet 50 milliGRAM(s) Oral once  predniSONE   Tablet 50 milliGRAM(s) Oral once    MEDICATIONS  (PRN):  ALBUTerol    90 MICROgram(s) HFA Inhaler 2 Puff(s) Inhalation every 6 hours PRN Shortness of Breath and/or Wheezing      REVIEW OF SYSTEMS:  CONSTITUTIONAL: No fever, weight loss, or fatigue  RESPIRATORY: No cough, wheezing, chills or hemoptysis; No shortness of breath  CARDIOVASCULAR: No chest pain, palpitations, dizziness, or leg swelling  GASTROINTESTINAL: No abdominal pain. No nausea, vomiting, or hematemesis; No diarrhea or constipation. No melena or hematochezia.  GENITOURINARY: No dysuria or hematuria, urinary frequency  NEUROLOGICAL: No headaches, memory loss, loss of strength, numbness, or tremors  SKIN: No itching, burning, rashes, or lesions     Vital Signs Last 24 Hrs  T(C): 37 (10 Aug 2021 11:04), Max: 37.8 (09 Aug 2021 19:23)  T(F): 98.6 (10 Aug 2021 11:04), Max: 100.1 (09 Aug 2021 19:23)  HR: 76 (10 Aug 2021 11:04) (74 - 92)  BP: 132/63 (10 Aug 2021 11:04) (120/54 - 132/63)  BP(mean): --  RR: 18 (10 Aug 2021 11:04) (17 - 18)  SpO2: 95% (10 Aug 2021 11:04) (92% - 100%)    PHYSICAL EXAMINATION:  GENERAL: NAD, well built  HEAD:  Atraumatic, Normocephalic  EYES:  conjunctiva and sclera clear  NECK: Supple, No JVD, Normal thyroid  CHEST/LUNG: Clear to auscultation. Clear to percussion bilaterally; No rales, rhonchi, wheezing, or rubs  HEART: Regular rate and rhythm; No murmurs, rubs, or gallops  ABDOMEN: Soft, Nontender, Nondistended; Bowel sounds present  NERVOUS SYSTEM:  Alert & Oriented X3,    EXTREMITIES:  2+ Peripheral Pulses, No clubbing, cyanosis, or edema  SKIN: warm dry                          13.7   11.35 )-----------( 238      ( 10 Aug 2021 07:21 )             41.1     08-09    139  |  104  |  26<H>  ----------------------------<  294<H>  4.9   |  25  |  1.15    Ca    9.4      09 Aug 2021 08:47        CARDIAC MARKERS ( 10 Aug 2021 00:16 )  3.350 ng/mL / x     / x     / x     / x      CARDIAC MARKERS ( 09 Aug 2021 20:16 )  4.910 ng/mL / x     / x     / x     / x      CARDIAC MARKERS ( 09 Aug 2021 08:47 )  5.360 ng/mL / x     / 95 U/L / x     / 17.2 ng/mL  CARDIAC MARKERS ( 09 Aug 2021 01:04 )  5.370 ng/mL / x     / 81 U/L / x     / 17.3 ng/mL  CARDIAC MARKERS ( 08 Aug 2021 20:23 )  3.590 ng/mL / x     / x     / x     / x          PTT - ( 10 Aug 2021 14:14 )  PTT:51.9 sec    CAPILLARY BLOOD GLUCOSE      RADIOLOGY & ADDITIONAL TESTS:                        INTERVAL HPI/OVERNIGHT EVENTS:  T(C): 37 (08-10-21 @ 11:04), Max: 37.8 (08-09-21 @ 19:23)  HR: 76 (08-10-21 @ 11:04) (74 - 92)  BP: 132/63 (08-10-21 @ 11:04) (120/54 - 132/63)  RR: 18 (08-10-21 @ 11:04) (17 - 18)  SpO2: 95% (08-10-21 @ 11:04) (92% - 100%)  Wt(kg): --  I&O's Summary    09 Aug 2021 07:01  -  10 Aug 2021 07:00  --------------------------------------------------------  IN: 126 mL / OUT: 0 mL / NET: 126 mL        REVIEW OF SYSTEMS: denies fever, chills, SOB, palpitations, chest pain, abdominal pain, nausea, vomitting, diarrhea, constipation, dizziness    MEDICATIONS  (STANDING):  albuterol/ipratropium for Nebulization 3 milliLiter(s) Nebulizer every 6 hours  aspirin enteric coated 81 milliGRAM(s) Oral daily  azithromycin  IVPB 500 milliGRAM(s) IV Intermittent every 24 hours  budesonide  80 MICROgram(s)/formoterol 4.5 MICROgram(s) Inhaler 2 Puff(s) Inhalation two times a day  cefTRIAXone   IVPB 1000 milliGRAM(s) IV Intermittent every 24 hours  clopidogrel Tablet 75 milliGRAM(s) Oral daily  heparin  Infusion.  Unit(s)/Hr (9.5 mL/Hr) IV Continuous <Continuous>  insulin glargine Injectable (LANTUS) 22 Unit(s) SubCutaneous at bedtime  insulin lispro (ADMELOG) corrective regimen sliding scale   SubCutaneous at bedtime  insulin lispro (ADMELOG) corrective regimen sliding scale   SubCutaneous Before meals and at bedtime  insulin lispro Injectable (ADMELOG) 10 Unit(s) SubCutaneous three times a day before meals  isosorbide   mononitrate ER Tablet (IMDUR) 30 milliGRAM(s) Oral daily  lisinopril 40 milliGRAM(s) Oral daily  metoprolol tartrate 25 milliGRAM(s) Oral two times a day  nicotine -  14 mG/24Hr(s) Patch 1 patch Transdermal daily  pantoprazole    Tablet 40 milliGRAM(s) Oral before breakfast  predniSONE   Tablet 50 milliGRAM(s) Oral once  predniSONE   Tablet 50 milliGRAM(s) Oral once    MEDICATIONS  (PRN):  ALBUTerol    90 MICROgram(s) HFA Inhaler 2 Puff(s) Inhalation every 6 hours PRN Shortness of Breath and/or Wheezing      PHYSICAL EXAM:  GENERAL: NAD, well-groomed, well-developed  HEAD:  Atraumatic, Normocephalic  EYES: EOMI, PERRLA, conjunctiva and sclera clear  ENMT: No tonsillar erythema, exudates, or enlargement; Moist mucous membranes, Good dentition, No lesions  NECK: Supple, No JVD, Normal thyroid  NERVOUS SYSTEM:  Alert & Oriented X3, Good concentration; Motor Strength 5/5 B/L upper and lower extremities; DTRs 2+ intact and symmetric  CHEST/LUNG: Clear to percussion bilaterally; No rales, rhonchi, wheezing, or rubs  HEART: Regular rate and rhythm; No murmurs, rubs, or gallops  ABDOMEN: Soft, Nontender, Nondistended; Bowel sounds present  EXTREMITIES:  2+ Peripheral Pulses, No clubbing, cyanosis, or edema  LYMPH: No lymphadenopathy noted  SKIN: No rashes or lesions  LABS:                        13.7   11.35 )-----------( 238      ( 10 Aug 2021 07:21 )             41.1     08-09    139  |  104  |  26<H>  ----------------------------<  294<H>  4.9   |  25  |  1.15    Ca    9.4      09 Aug 2021 08:47      PTT - ( 10 Aug 2021 07:21 )  PTT:40.2 sec    CAPILLARY BLOOD GLUCOSE      POCT Blood Glucose.: 397 mg/dL (10 Aug 2021 11:53)  POCT Blood Glucose.: 431 mg/dL (10 Aug 2021 08:01)  POCT Blood Glucose.: 435 mg/dL (10 Aug 2021 05:20)  POCT Blood Glucose.: 378 mg/dL (09 Aug 2021 21:00)  POCT Blood Glucose.: 260 mg/dL (09 Aug 2021 16:39)  POCT Blood Glucose.: 303 mg/dL (09 Aug 2021 14:04)      ABG - ( 09 Aug 2021 11:34 )  pH, Arterial: 7.45  pH, Blood: x     /  pCO2: 31    /  pO2: 62    / HCO3: 22    / Base Excess: -1.6  /  SaO2: 94

## 2021-08-10 NOTE — CONSULT NOTE ADULT - ASSESSMENT
Patient is a 61y old  Male who presents wit Male with medical hx significant for HTN, HLD, DM, CAD with triple vessel bypass, PAD s/o Right LE stent, and Active smoker,  presents to the ER for evaluation of chest tightness and shortness of breath. Initially Patient attributed it to his hx of Anxiety. He and his wife attended a gathering with about 30 people in close proximity , both are vaccinated. No hx of sick contacts, fevers, leg swelling, palpitations. In the ER, the Cxray shows evidence of increased markings in RLL, and CT Angio shows moderate emphysema, pulm HTN and elevated Trop 0.973-> 2.040, no ischemic changes. He is S/p Full dose Lovenox  and then heparin drip with the plan to transfer to [facility] for cardiac cath. Today, The ID consult requested after blood culture came back positive, to assist with further evaluation and antibiotic management.     # SIRS( Tachycardia  + Leukocytosis ) - resolved  # NSTEMI  # Bacterenmia- Coagulase  Negative Staph  would recommend:    1. Repeat Blood cultures X 2 to document clearing the  blood stream   2. Follow up Final Blood cultures  3. Management of NSTEMI as per Primary/Cardiology team     d/w House staff     will follow the patient with you and make further recommendation based on the clinical course and Lab results  Thank you for the opportunity to participate in Mr. STOCKTON's care      Attending Attestation:    Spent more than 65 minutes on total encounter, more than 50 % of the visit was spent counseling and/or coordinating care by the Attending physician.         Patient is a 61y old  Male who presents wit Male with medical hx significant for HTN, HLD, DM, CAD with triple vessel bypass, PAD s/o Right LE stent, and Active smoker,  presents to the ER for evaluation of chest tightness and shortness of breath. Initially Patient attributed it to his hx of Anxiety. He and his wife attended a gathering with about 30 people in close proximity , both are vaccinated. No hx of sick contacts, fevers, leg swelling, palpitations. In the ER, the Cxray shows evidence of increased markings in RLL, and CT Angio shows moderate emphysema, pulm HTN and elevated Trop 0.973-> 2.040, no ischemic changes. He is S/p Full dose Lovenox  and then heparin drip with the plan to transfer to [facility] for cardiac cath. Today, The ID consult requested after blood culture came back positive, to assist with further evaluation and antibiotic management.     # SIRS( Tachycardia  + Leukocytosis ) - resolved  # NSTEMI  # Bacterenmia- Coagulase  Negative Staph- most likely a contaminant     would recommend:    1. Repeat Blood cultures X 2 to document clearing the  blood stream   2. Follow up Final Blood cultures  3. Management of NSTEMI as per Primary/Cardiology team   4. Supplemental oxygenation and bronchodilator as needed    d/w House staff and patient     will follow the patient with you and make further recommendation based on the clinical course and Lab results  Thank you for the opportunity to participate in Mr. STOCKTON's care      Attending Attestation:    Spent more than 65 minutes on total encounter, more than 50 % of the visit was spent counseling and/or coordinating care by the Attending physician.

## 2021-08-10 NOTE — PROGRESS NOTE ADULT - SUBJECTIVE AND OBJECTIVE BOX
Time of Visit:  Patient seen and examined.     MEDICATIONS  (STANDING):  albuterol/ipratropium for Nebulization 3 milliLiter(s) Nebulizer every 6 hours  aspirin enteric coated 81 milliGRAM(s) Oral daily  azithromycin  IVPB 500 milliGRAM(s) IV Intermittent every 24 hours  budesonide  80 MICROgram(s)/formoterol 4.5 MICROgram(s) Inhaler 2 Puff(s) Inhalation two times a day  cefTRIAXone   IVPB 1000 milliGRAM(s) IV Intermittent every 24 hours  clopidogrel Tablet 75 milliGRAM(s) Oral daily  heparin  Infusion.  Unit(s)/Hr (9.5 mL/Hr) IV Continuous <Continuous>  insulin glargine Injectable (LANTUS) 22 Unit(s) SubCutaneous at bedtime  insulin lispro (ADMELOG) corrective regimen sliding scale   SubCutaneous at bedtime  insulin lispro (ADMELOG) corrective regimen sliding scale   SubCutaneous Before meals and at bedtime  insulin lispro Injectable (ADMELOG) 10 Unit(s) SubCutaneous three times a day before meals  isosorbide   mononitrate ER Tablet (IMDUR) 30 milliGRAM(s) Oral daily  lisinopril 40 milliGRAM(s) Oral daily  metoprolol tartrate 25 milliGRAM(s) Oral two times a day  nicotine -  14 mG/24Hr(s) Patch 1 patch Transdermal daily  pantoprazole    Tablet 40 milliGRAM(s) Oral before breakfast  predniSONE   Tablet 50 milliGRAM(s) Oral once      MEDICATIONS  (PRN):  ALBUTerol    90 MICROgram(s) HFA Inhaler 2 Puff(s) Inhalation every 6 hours PRN Shortness of Breath and/or Wheezing       Medications up to date at time of exam.      PHYSICAL EXAMINATION:  Patient has no new complaints.  GENERAL: The patient is a well-developed, well-nourished, in no apparent distress.     Vital Signs Last 24 Hrs  T(C): 36.8 (10 Aug 2021 15:48), Max: 37.8 (09 Aug 2021 19:23)  T(F): 98.3 (10 Aug 2021 15:48), Max: 100.1 (09 Aug 2021 19:23)  HR: 83 (10 Aug 2021 15:48) (74 - 92)  BP: 120/47 (10 Aug 2021 15:48) (120/47 - 132/63)  BP(mean): --  RR: 18 (10 Aug 2021 15:48) (17 - 18)  SpO2: 97% (10 Aug 2021 15:48) (92% - 100%)   (if applicable)    Chest Tube (if applicable)    HEENT: Head is normocephalic and atraumatic. Extraocular muscles are intact. Mucous membranes are moist.     NECK: Supple, no palpable adenopathy.    LUNGS: Clear to auscultation, no wheezing, rales, or rhonchi.    HEART: Regular rate and rhythm without murmur.    ABDOMEN: Soft, nontender, and nondistended.  No hepatosplenomegaly is noted.    : No painful voiding, no pelvic pain    EXTREMITIES: Without any cyanosis, clubbing, rash, lesions or edema.    NEUROLOGIC: Awake, alert, oriented, grossly intact    SKIN: Warm, dry, good turgor.      LABS:                        13.7   11.35 )-----------( 238      ( 10 Aug 2021 07:21 )             41.1     08-09    139  |  104  |  26<H>  ----------------------------<  294<H>  4.9   |  25  |  1.15    Ca    9.4      09 Aug 2021 08:47      PTT - ( 10 Aug 2021 14:14 )  PTT:51.9 sec    ABG - ( 09 Aug 2021 11:34 )  pH, Arterial: 7.45  pH, Blood: x     /  pCO2: 31    /  pO2: 62    / HCO3: 22    / Base Excess: -1.6  /  SaO2: 94                CARDIAC MARKERS ( 10 Aug 2021 00:16 )  3.350 ng/mL / x     / x     / x     / x      CARDIAC MARKERS ( 09 Aug 2021 20:16 )  4.910 ng/mL / x     / x     / x     / x      CARDIAC MARKERS ( 09 Aug 2021 08:47 )  5.360 ng/mL / x     / 95 U/L / x     / 17.2 ng/mL  CARDIAC MARKERS ( 09 Aug 2021 01:04 )  5.370 ng/mL / x     / 81 U/L / x     / 17.3 ng/mL  CARDIAC MARKERS ( 08 Aug 2021 20:23 )  3.590 ng/mL / x     / x     / x     / x            Serum Pro-Brain Natriuretic Peptide: 3195 pg/mL (08-08-21 @ 07:21)      Procalcitonin, Serum: 0.11 ng/mL (08-09-21 @ 03:44)      MICROBIOLOGY: (if applicable)    RADIOLOGY & ADDITIONAL STUDIES:  EKG:   CXR:  ECHO:    IMPRESSION: 61y Male PAST MEDICAL & SURGICAL HISTORY:  DM (Diabetes Mellitus)    HTN (Hypertension)    Hyperlipidemia    Coronary Atherosclerosis of Native Coronary Artery    CAD (coronary artery disease)    Smoker    BPH (benign prostatic hyperplasia)    Anxiety    PAD (peripheral artery disease)    HTN (hypertension)    History of Tonsillectomy    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 ago    S/P CABG x 3  2014 with Dr Oshea     p/w       Impression: This is a  60 Y/O male presented to ED with shortness of breath since early this morning. He had an episode of shortness of breath on Tuesday morning. However, today morning, he woke up around 4 am saying "I can't breath" prompting ED admission. Pt also associated with cough and minimal sputum production (clear or white in color, not blood tinged or streaked). Pt complains of chest tightness, not chest pain. Pt and pt's wife attended a gathering with about 30 people in close proximity yesterday, both pt and pt's wife are vaccinated. Admitted with Acute Hypoxic Respiratory Failure secondary to found on CXR with RLL opacity and on CT Chest no PE , Small bilateral pleural effusions. Moderate Emphysema and Moderate pHTN. Negative Covid 19 PCR on 08-08-21.  + NSTEMI     Suggestion:  O2 saturation 88% room air at rest. With Oxygen supplementation with O2 saturation 94%. Continue Oxygen supplementation 6LNC .   Continue Azithromycin and Ceftriaxone IVPB Daily.  Continue Duoneb via nebulization Q 6 Hours. Do Not give at the same time with Duoneb nebulization the PRN Albuterol 2 Puffs Q 6 Hours.    Continue Budesonide 2 Puffs Twice daily.   Continue Solumedrol 40 mg Twice daily.   DVT/ GI prophylactic .   Reinforced the importance of smoking cessation and on Nicotine patch.       Continue ASA, ACE- and Beta -  continue tele monitoring   cards f/u for further cardiac work up

## 2021-08-11 ENCOUNTER — INPATIENT (INPATIENT)
Facility: HOSPITAL | Age: 61
LOS: 1 days | Discharge: NURSING, REHAB OR LONG-TERM CARE | End: 2021-08-13
Attending: INTERNAL MEDICINE | Admitting: INTERNAL MEDICINE
Payer: MEDICARE

## 2021-08-11 VITALS
DIASTOLIC BLOOD PRESSURE: 60 MMHG | SYSTOLIC BLOOD PRESSURE: 121 MMHG | TEMPERATURE: 98 F | RESPIRATION RATE: 16 BRPM | HEIGHT: 70 IN | WEIGHT: 184.31 LBS | OXYGEN SATURATION: 95 % | HEART RATE: 80 BPM

## 2021-08-11 VITALS
TEMPERATURE: 98 F | DIASTOLIC BLOOD PRESSURE: 53 MMHG | RESPIRATION RATE: 18 BRPM | HEART RATE: 76 BPM | OXYGEN SATURATION: 95 % | SYSTOLIC BLOOD PRESSURE: 132 MMHG

## 2021-08-11 DIAGNOSIS — Z95.1 PRESENCE OF AORTOCORONARY BYPASS GRAFT: Chronic | ICD-10-CM

## 2021-08-11 DIAGNOSIS — Z95.820 PERIPHERAL VASCULAR ANGIOPLASTY STATUS WITH IMPLANTS AND GRAFTS: Chronic | ICD-10-CM

## 2021-08-11 DIAGNOSIS — E11.9 TYPE 2 DIABETES MELLITUS WITHOUT COMPLICATIONS: ICD-10-CM

## 2021-08-11 DIAGNOSIS — I21.4 NON-ST ELEVATION (NSTEMI) MYOCARDIAL INFARCTION: ICD-10-CM

## 2021-08-11 DIAGNOSIS — J44.1 CHRONIC OBSTRUCTIVE PULMONARY DISEASE WITH (ACUTE) EXACERBATION: ICD-10-CM

## 2021-08-11 DIAGNOSIS — I10 ESSENTIAL (PRIMARY) HYPERTENSION: ICD-10-CM

## 2021-08-11 DIAGNOSIS — R07.9 CHEST PAIN, UNSPECIFIED: ICD-10-CM

## 2021-08-11 DIAGNOSIS — J18.9 PNEUMONIA, UNSPECIFIED ORGANISM: ICD-10-CM

## 2021-08-11 LAB
ANION GAP SERPL CALC-SCNC: 16 MMOL/L — HIGH (ref 7–14)
APTT BLD: 93.6 SEC — HIGH (ref 27.5–35.5)
B-OH-BUTYR SERPL-SCNC: <0 MMOL/L — SIGNIFICANT CHANGE UP (ref 0–0.4)
BASE EXCESS BLDV CALC-SCNC: 0 MMOL/L — SIGNIFICANT CHANGE UP (ref -2–3)
BLOOD GAS VENOUS COMPREHENSIVE RESULT: SIGNIFICANT CHANGE UP
BUN SERPL-MCNC: 25 MG/DL — HIGH (ref 7–23)
CALCIUM SERPL-MCNC: 9.2 MG/DL — SIGNIFICANT CHANGE UP (ref 8.4–10.5)
CHLORIDE BLDV-SCNC: 100 MMOL/L — SIGNIFICANT CHANGE UP (ref 96–108)
CHLORIDE SERPL-SCNC: 100 MMOL/L — SIGNIFICANT CHANGE UP (ref 98–107)
CO2 BLDV-SCNC: 26 MMOL/L — SIGNIFICANT CHANGE UP (ref 22–26)
CO2 SERPL-SCNC: 20 MMOL/L — LOW (ref 22–31)
CREAT SERPL-MCNC: 1 MG/DL — SIGNIFICANT CHANGE UP (ref 0.5–1.3)
GAS PNL BLDV: 133 MMOL/L — LOW (ref 136–145)
GLUCOSE BLDC GLUCOMTR-MCNC: 430 MG/DL — HIGH (ref 70–99)
GLUCOSE BLDV-MCNC: 437 MG/DL — HIGH (ref 70–99)
GLUCOSE SERPL-MCNC: 429 MG/DL — HIGH (ref 70–99)
HCO3 BLDV-SCNC: 25 MMOL/L — SIGNIFICANT CHANGE UP (ref 22–29)
HCT VFR BLDA CALC: 38 % — LOW (ref 39–51)
HGB BLD CALC-MCNC: 12.8 G/DL — LOW (ref 13–17)
LACTATE BLDV-MCNC: 3.6 MMOL/L — HIGH (ref 0.5–2)
PCO2 BLDV: 40 MMHG — LOW (ref 42–55)
PH BLDV: 7.4 — SIGNIFICANT CHANGE UP (ref 7.32–7.43)
PO2 BLDV: 38 MMHG — SIGNIFICANT CHANGE UP
POTASSIUM BLDV-SCNC: 4 MMOL/L — SIGNIFICANT CHANGE UP (ref 3.5–5.1)
POTASSIUM SERPL-MCNC: 3.8 MMOL/L — SIGNIFICANT CHANGE UP (ref 3.5–5.3)
POTASSIUM SERPL-SCNC: 3.8 MMOL/L — SIGNIFICANT CHANGE UP (ref 3.5–5.3)
SAO2 % BLDV: 68 % — SIGNIFICANT CHANGE UP
SODIUM SERPL-SCNC: 136 MMOL/L — SIGNIFICANT CHANGE UP (ref 135–145)

## 2021-08-11 PROCEDURE — 85027 COMPLETE CBC AUTOMATED: CPT

## 2021-08-11 PROCEDURE — 36415 COLL VENOUS BLD VENIPUNCTURE: CPT

## 2021-08-11 PROCEDURE — 86803 HEPATITIS C AB TEST: CPT

## 2021-08-11 PROCEDURE — 93306 TTE W/DOPPLER COMPLETE: CPT

## 2021-08-11 PROCEDURE — 83880 ASSAY OF NATRIURETIC PEPTIDE: CPT

## 2021-08-11 PROCEDURE — 85730 THROMBOPLASTIN TIME PARTIAL: CPT

## 2021-08-11 PROCEDURE — 85379 FIBRIN DEGRADATION QUANT: CPT

## 2021-08-11 PROCEDURE — 83036 HEMOGLOBIN GLYCOSYLATED A1C: CPT

## 2021-08-11 PROCEDURE — 87150 DNA/RNA AMPLIFIED PROBE: CPT

## 2021-08-11 PROCEDURE — 71045 X-RAY EXAM CHEST 1 VIEW: CPT

## 2021-08-11 PROCEDURE — 93010 ELECTROCARDIOGRAM REPORT: CPT

## 2021-08-11 PROCEDURE — 94640 AIRWAY INHALATION TREATMENT: CPT

## 2021-08-11 PROCEDURE — 84145 PROCALCITONIN (PCT): CPT

## 2021-08-11 PROCEDURE — 99291 CRITICAL CARE FIRST HOUR: CPT

## 2021-08-11 PROCEDURE — 85025 COMPLETE CBC W/AUTO DIFF WBC: CPT

## 2021-08-11 PROCEDURE — 94760 N-INVAS EAR/PLS OXIMETRY 1: CPT

## 2021-08-11 PROCEDURE — 93005 ELECTROCARDIOGRAM TRACING: CPT

## 2021-08-11 PROCEDURE — 86769 SARS-COV-2 COVID-19 ANTIBODY: CPT

## 2021-08-11 PROCEDURE — 80307 DRUG TEST PRSMV CHEM ANLYZR: CPT

## 2021-08-11 PROCEDURE — 82962 GLUCOSE BLOOD TEST: CPT

## 2021-08-11 PROCEDURE — 82550 ASSAY OF CK (CPK): CPT

## 2021-08-11 PROCEDURE — G1004: CPT

## 2021-08-11 PROCEDURE — 87040 BLOOD CULTURE FOR BACTERIA: CPT

## 2021-08-11 PROCEDURE — 82553 CREATINE MB FRACTION: CPT

## 2021-08-11 PROCEDURE — 80048 BASIC METABOLIC PNL TOTAL CA: CPT

## 2021-08-11 PROCEDURE — 84484 ASSAY OF TROPONIN QUANT: CPT

## 2021-08-11 PROCEDURE — 71275 CT ANGIOGRAPHY CHEST: CPT | Mod: MG

## 2021-08-11 PROCEDURE — 80053 COMPREHEN METABOLIC PANEL: CPT

## 2021-08-11 PROCEDURE — 87635 SARS-COV-2 COVID-19 AMP PRB: CPT

## 2021-08-11 PROCEDURE — 82803 BLOOD GASES ANY COMBINATION: CPT

## 2021-08-11 PROCEDURE — 87077 CULTURE AEROBIC IDENTIFY: CPT

## 2021-08-11 RX ORDER — GLUCAGON INJECTION, SOLUTION 0.5 MG/.1ML
1 INJECTION, SOLUTION SUBCUTANEOUS ONCE
Refills: 0 | Status: DISCONTINUED | OUTPATIENT
Start: 2021-08-11 | End: 2021-08-13

## 2021-08-11 RX ORDER — LISINOPRIL 2.5 MG/1
1 TABLET ORAL
Qty: 0 | Refills: 0 | DISCHARGE

## 2021-08-11 RX ORDER — CEFTRIAXONE 500 MG/1
1000 INJECTION, POWDER, FOR SOLUTION INTRAMUSCULAR; INTRAVENOUS EVERY 24 HOURS
Refills: 0 | Status: COMPLETED | OUTPATIENT
Start: 2021-08-11 | End: 2021-08-12

## 2021-08-11 RX ORDER — INSULIN LISPRO 100/ML
10 VIAL (ML) SUBCUTANEOUS
Refills: 0 | Status: DISCONTINUED | OUTPATIENT
Start: 2021-08-11 | End: 2021-08-11

## 2021-08-11 RX ORDER — AZITHROMYCIN 500 MG/1
500 TABLET, FILM COATED ORAL EVERY 24 HOURS
Refills: 0 | Status: DISCONTINUED | OUTPATIENT
Start: 2021-08-11 | End: 2021-08-12

## 2021-08-11 RX ORDER — NICOTINE POLACRILEX 2 MG
1 GUM BUCCAL DAILY
Refills: 0 | Status: DISCONTINUED | OUTPATIENT
Start: 2021-08-11 | End: 2021-08-13

## 2021-08-11 RX ORDER — AMLODIPINE BESYLATE 2.5 MG/1
1 TABLET ORAL
Qty: 0 | Refills: 0 | DISCHARGE

## 2021-08-11 RX ORDER — DEXTROSE 50 % IN WATER 50 %
15 SYRINGE (ML) INTRAVENOUS ONCE
Refills: 0 | Status: DISCONTINUED | OUTPATIENT
Start: 2021-08-11 | End: 2021-08-13

## 2021-08-11 RX ORDER — INSULIN LISPRO 100/ML
VIAL (ML) SUBCUTANEOUS AT BEDTIME
Refills: 0 | Status: DISCONTINUED | OUTPATIENT
Start: 2021-08-11 | End: 2021-08-12

## 2021-08-11 RX ORDER — INSULIN LISPRO 100/ML
5 VIAL (ML) SUBCUTANEOUS
Qty: 0 | Refills: 0 | DISCHARGE

## 2021-08-11 RX ORDER — INSULIN GLARGINE 100 [IU]/ML
22 INJECTION, SOLUTION SUBCUTANEOUS AT BEDTIME
Refills: 0 | Status: DISCONTINUED | OUTPATIENT
Start: 2021-08-11 | End: 2021-08-11

## 2021-08-11 RX ORDER — ATORVASTATIN CALCIUM 80 MG/1
40 TABLET, FILM COATED ORAL AT BEDTIME
Refills: 0 | Status: DISCONTINUED | OUTPATIENT
Start: 2021-08-11 | End: 2021-08-13

## 2021-08-11 RX ORDER — LISINOPRIL 2.5 MG/1
40 TABLET ORAL DAILY
Refills: 0 | Status: DISCONTINUED | OUTPATIENT
Start: 2021-08-11 | End: 2021-08-13

## 2021-08-11 RX ORDER — ASPIRIN/CALCIUM CARB/MAGNESIUM 324 MG
1 TABLET ORAL
Qty: 0 | Refills: 0 | DISCHARGE

## 2021-08-11 RX ORDER — HYDROCORTISONE 20 MG
100 TABLET ORAL ONCE
Refills: 0 | Status: COMPLETED | OUTPATIENT
Start: 2021-08-11 | End: 2021-08-11

## 2021-08-11 RX ORDER — ASCORBIC ACID 60 MG
1 TABLET,CHEWABLE ORAL
Qty: 0 | Refills: 0 | DISCHARGE

## 2021-08-11 RX ORDER — SODIUM CHLORIDE 9 MG/ML
3 INJECTION INTRAMUSCULAR; INTRAVENOUS; SUBCUTANEOUS EVERY 8 HOURS
Refills: 0 | Status: DISCONTINUED | OUTPATIENT
Start: 2021-08-11 | End: 2021-08-13

## 2021-08-11 RX ORDER — NICOTINE POLACRILEX 2 MG
2 GUM BUCCAL
Refills: 0 | Status: DISCONTINUED | OUTPATIENT
Start: 2021-08-11 | End: 2021-08-13

## 2021-08-11 RX ORDER — INSULIN GLARGINE 100 [IU]/ML
20 INJECTION, SOLUTION SUBCUTANEOUS AT BEDTIME
Refills: 0 | Status: DISCONTINUED | OUTPATIENT
Start: 2021-08-11 | End: 2021-08-12

## 2021-08-11 RX ORDER — SODIUM CHLORIDE 9 MG/ML
1000 INJECTION, SOLUTION INTRAVENOUS
Refills: 0 | Status: DISCONTINUED | OUTPATIENT
Start: 2021-08-11 | End: 2021-08-13

## 2021-08-11 RX ORDER — INSULIN LISPRO 100/ML
15 VIAL (ML) SUBCUTANEOUS
Refills: 0 | Status: DISCONTINUED | OUTPATIENT
Start: 2021-08-11 | End: 2021-08-11

## 2021-08-11 RX ORDER — MEPERIDINE HYDROCHLORIDE 50 MG/ML
12.5 INJECTION INTRAMUSCULAR; INTRAVENOUS; SUBCUTANEOUS ONCE
Refills: 0 | Status: DISCONTINUED | OUTPATIENT
Start: 2021-08-11 | End: 2021-08-11

## 2021-08-11 RX ORDER — INSULIN GLARGINE 100 [IU]/ML
18 INJECTION, SOLUTION SUBCUTANEOUS
Qty: 0 | Refills: 0 | DISCHARGE

## 2021-08-11 RX ORDER — IPRATROPIUM/ALBUTEROL SULFATE 18-103MCG
3 AEROSOL WITH ADAPTER (GRAM) INHALATION EVERY 6 HOURS
Refills: 0 | Status: DISCONTINUED | OUTPATIENT
Start: 2021-08-11 | End: 2021-08-12

## 2021-08-11 RX ORDER — HEPARIN SODIUM 5000 [USP'U]/ML
5000 INJECTION INTRAVENOUS; SUBCUTANEOUS EVERY 12 HOURS
Refills: 0 | Status: DISCONTINUED | OUTPATIENT
Start: 2021-08-12 | End: 2021-08-13

## 2021-08-11 RX ORDER — DEXTROSE 50 % IN WATER 50 %
12.5 SYRINGE (ML) INTRAVENOUS ONCE
Refills: 0 | Status: DISCONTINUED | OUTPATIENT
Start: 2021-08-11 | End: 2021-08-13

## 2021-08-11 RX ORDER — CHOLECALCIFEROL (VITAMIN D3) 125 MCG
1 CAPSULE ORAL
Qty: 0 | Refills: 0 | DISCHARGE

## 2021-08-11 RX ORDER — INSULIN GLARGINE 100 [IU]/ML
30 INJECTION, SOLUTION SUBCUTANEOUS AT BEDTIME
Refills: 0 | Status: DISCONTINUED | OUTPATIENT
Start: 2021-08-11 | End: 2021-08-11

## 2021-08-11 RX ORDER — ISOSORBIDE MONONITRATE 60 MG/1
1 TABLET, EXTENDED RELEASE ORAL
Qty: 0 | Refills: 0 | DISCHARGE

## 2021-08-11 RX ORDER — DEXTROSE 50 % IN WATER 50 %
25 SYRINGE (ML) INTRAVENOUS ONCE
Refills: 0 | Status: DISCONTINUED | OUTPATIENT
Start: 2021-08-11 | End: 2021-08-13

## 2021-08-11 RX ORDER — METOPROLOL TARTRATE 50 MG
1 TABLET ORAL
Qty: 0 | Refills: 0 | DISCHARGE

## 2021-08-11 RX ORDER — ISOSORBIDE MONONITRATE 60 MG/1
60 TABLET, EXTENDED RELEASE ORAL DAILY
Refills: 0 | Status: DISCONTINUED | OUTPATIENT
Start: 2021-08-11 | End: 2021-08-13

## 2021-08-11 RX ORDER — PANTOPRAZOLE SODIUM 20 MG/1
40 TABLET, DELAYED RELEASE ORAL
Refills: 0 | Status: DISCONTINUED | OUTPATIENT
Start: 2021-08-11 | End: 2021-08-13

## 2021-08-11 RX ORDER — INSULIN LISPRO 100/ML
VIAL (ML) SUBCUTANEOUS AT BEDTIME
Refills: 0 | Status: DISCONTINUED | OUTPATIENT
Start: 2021-08-11 | End: 2021-08-11

## 2021-08-11 RX ORDER — AMLODIPINE BESYLATE 2.5 MG/1
10 TABLET ORAL DAILY
Refills: 0 | Status: DISCONTINUED | OUTPATIENT
Start: 2021-08-11 | End: 2021-08-13

## 2021-08-11 RX ORDER — ALBUTEROL 90 UG/1
2 AEROSOL, METERED ORAL EVERY 6 HOURS
Refills: 0 | Status: DISCONTINUED | OUTPATIENT
Start: 2021-08-11 | End: 2021-08-11

## 2021-08-11 RX ORDER — BUDESONIDE AND FORMOTEROL FUMARATE DIHYDRATE 160; 4.5 UG/1; UG/1
2 AEROSOL RESPIRATORY (INHALATION)
Refills: 0 | Status: DISCONTINUED | OUTPATIENT
Start: 2021-08-11 | End: 2021-08-13

## 2021-08-11 RX ORDER — INSULIN LISPRO 100/ML
VIAL (ML) SUBCUTANEOUS
Refills: 0 | Status: DISCONTINUED | OUTPATIENT
Start: 2021-08-11 | End: 2021-08-12

## 2021-08-11 RX ORDER — INSULIN LISPRO 100/ML
10 VIAL (ML) SUBCUTANEOUS
Refills: 0 | Status: DISCONTINUED | OUTPATIENT
Start: 2021-08-11 | End: 2021-08-12

## 2021-08-11 RX ORDER — INSULIN LISPRO 100/ML
VIAL (ML) SUBCUTANEOUS
Refills: 0 | Status: DISCONTINUED | OUTPATIENT
Start: 2021-08-11 | End: 2021-08-11

## 2021-08-11 RX ORDER — ASPIRIN/CALCIUM CARB/MAGNESIUM 324 MG
81 TABLET ORAL DAILY
Refills: 0 | Status: DISCONTINUED | OUTPATIENT
Start: 2021-08-11 | End: 2021-08-13

## 2021-08-11 RX ORDER — CLOPIDOGREL BISULFATE 75 MG/1
75 TABLET, FILM COATED ORAL DAILY
Refills: 0 | Status: DISCONTINUED | OUTPATIENT
Start: 2021-08-11 | End: 2021-08-13

## 2021-08-11 RX ORDER — ERGOCALCIFEROL 1.25 MG/1
1 CAPSULE ORAL
Qty: 0 | Refills: 0 | DISCHARGE

## 2021-08-11 RX ORDER — METOPROLOL TARTRATE 50 MG
100 TABLET ORAL DAILY
Refills: 0 | Status: DISCONTINUED | OUTPATIENT
Start: 2021-08-11 | End: 2021-08-13

## 2021-08-11 RX ORDER — FUROSEMIDE 40 MG
20 TABLET ORAL EVERY 12 HOURS
Refills: 0 | Status: DISCONTINUED | OUTPATIENT
Start: 2021-08-11 | End: 2021-08-13

## 2021-08-11 RX ORDER — CLOPIDOGREL BISULFATE 75 MG/1
1 TABLET, FILM COATED ORAL
Qty: 0 | Refills: 0 | DISCHARGE

## 2021-08-11 RX ADMIN — Medication 50 MILLIGRAM(S): at 00:54

## 2021-08-11 RX ADMIN — BUDESONIDE AND FORMOTEROL FUMARATE DIHYDRATE 2 PUFF(S): 160; 4.5 AEROSOL RESPIRATORY (INHALATION) at 23:14

## 2021-08-11 RX ADMIN — ATORVASTATIN CALCIUM 40 MILLIGRAM(S): 80 TABLET, FILM COATED ORAL at 21:44

## 2021-08-11 RX ADMIN — Medication 10 UNIT(S): at 17:04

## 2021-08-11 RX ADMIN — Medication 25 MILLIGRAM(S): at 05:25

## 2021-08-11 RX ADMIN — HEPARIN SODIUM 1150 UNIT(S)/HR: 5000 INJECTION INTRAVENOUS; SUBCUTANEOUS at 04:43

## 2021-08-11 RX ADMIN — HEPARIN SODIUM 0 UNIT(S)/HR: 5000 INJECTION INTRAVENOUS; SUBCUTANEOUS at 03:28

## 2021-08-11 RX ADMIN — Medication 1 PATCH: at 18:58

## 2021-08-11 RX ADMIN — Medication 3 MILLILITER(S): at 04:49

## 2021-08-11 RX ADMIN — Medication 3 MILLILITER(S): at 11:26

## 2021-08-11 RX ADMIN — SODIUM CHLORIDE 3 MILLILITER(S): 9 INJECTION INTRAMUSCULAR; INTRAVENOUS; SUBCUTANEOUS at 21:45

## 2021-08-11 RX ADMIN — INSULIN GLARGINE 20 UNIT(S): 100 INJECTION, SOLUTION SUBCUTANEOUS at 21:43

## 2021-08-11 RX ADMIN — LISINOPRIL 40 MILLIGRAM(S): 2.5 TABLET ORAL at 05:25

## 2021-08-11 RX ADMIN — Medication 1 PATCH: at 14:36

## 2021-08-11 RX ADMIN — Medication 5: at 11:51

## 2021-08-11 RX ADMIN — Medication 50 MILLIGRAM(S): at 06:47

## 2021-08-11 RX ADMIN — SODIUM CHLORIDE 3 MILLILITER(S): 9 INJECTION INTRAMUSCULAR; INTRAVENOUS; SUBCUTANEOUS at 14:37

## 2021-08-11 RX ADMIN — Medication 20 MILLIGRAM(S): at 21:44

## 2021-08-11 RX ADMIN — CLOPIDOGREL BISULFATE 75 MILLIGRAM(S): 75 TABLET, FILM COATED ORAL at 13:18

## 2021-08-11 RX ADMIN — Medication 12: at 06:54

## 2021-08-11 RX ADMIN — Medication 12: at 17:05

## 2021-08-11 RX ADMIN — Medication 81 MILLIGRAM(S): at 13:18

## 2021-08-11 RX ADMIN — AZITHROMYCIN 255 MILLIGRAM(S): 500 TABLET, FILM COATED ORAL at 11:29

## 2021-08-11 RX ADMIN — ISOSORBIDE MONONITRATE 60 MILLIGRAM(S): 60 TABLET, EXTENDED RELEASE ORAL at 14:36

## 2021-08-11 RX ADMIN — Medication 100 MILLIGRAM(S): at 09:49

## 2021-08-11 RX ADMIN — PANTOPRAZOLE SODIUM 40 MILLIGRAM(S): 20 TABLET, DELAYED RELEASE ORAL at 06:39

## 2021-08-11 RX ADMIN — CEFTRIAXONE 100 MILLIGRAM(S): 500 INJECTION, POWDER, FOR SOLUTION INTRAMUSCULAR; INTRAVENOUS at 11:27

## 2021-08-11 RX ADMIN — Medication 50 MILLIGRAM(S): at 07:00

## 2021-08-11 NOTE — PROVIDER CONTACT NOTE (OTHER) - ASSESSMENT
A&Ox4. without any symptoms of hyperglycemia. Breathing comfortable on room air. denies any pain or SOB. no s/s distress noted.

## 2021-08-11 NOTE — CONSULT NOTE ADULT - SUBJECTIVE AND OBJECTIVE BOX
Chart reviewed, Full consult to follow. Patient is a 61y old  Male who presents with a chief complaint of Transferred for cardiac cath (11 Aug 2021 11:53)      HPI:    62 y/o M w/ PMH of HTN, HLD, DM, CAD s/p CABG + stents, PAD s/p RLE stent + LLE POBA and Active smoker presented to [facility] ED for complaint of chest tightness and shortness of breath. Initially Patient attributed it to his hx of Anxiety. He and his wife attended a gathering with about 30 people in close proximity , both are vaccinated. No hx of sick contacts, fevers, leg swelling, palpitations.     In the ER, the Cxray shows evidence of increased markings in RLL and CT Angio shows moderate emphysema + pulm HTN and interstitial edema. Pt started on IV Ceftriaxone + Azithromycin and Blood cultures showed Gram + cocci in clusters. Pt was seen by ID, and repeat blood cultures sent.     Pt found to have elevated Troponins which are now downtrending. He is s/p Full dose Lovenox and then heparin drip. Pt also is being treated for a COPD exacerbation with duonebs + steroids. Of note patient has an IV contrast allergy and was pre-medicated with 3 doses of PO prednisone and IV Benadryl. Pt now transferred to [facility] for cardiac catheterization. (11 Aug 2021 08:36)            REVIEW OF SYSTEMS:    CONSTITUTIONAL: No fever, weight loss, or fatigue  EYES: No eye pain, visual disturbances, or discharge  ENMT:  No sore throat  NECK: No pain or stiffness  RESPIRATORY: No cough, wheezing, chills or hemoptysis; No shortness of breath  CARDIOVASCULAR: No chest pain, palpitations, dizziness, or leg swelling  GASTROINTESTINAL: No abdominal or epigastric pain. No nausea, vomiting, or hematemesis; No diarrhea or constipation. No melena or hematochezia.  GENITOURINARY: No dysuria, frequency, hematuria, or incontinence  NEUROLOGICAL: No headaches, memory loss, loss of strength, numbness, or tremors  SKIN: No itching, burning, rashes, or lesions   LYMPH NODES: No enlarged glands  MUSCULOSKELETAL: No joint pain or swelling; No muscle, back, or extremity pain      PAST MEDICAL & SURGICAL HISTORY:  DM (Diabetes Mellitus)    HTN (Hypertension)    Hyperlipidemia    Coronary Atherosclerosis of Native Coronary Artery    CAD (coronary artery disease)    Smoker    BPH (benign prostatic hyperplasia)    Anxiety    PAD (peripheral artery disease)    HTN (hypertension)    History of Tonsillectomy    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 ago    S/P CABG x 3   with Dr Oshea    S/P peripheral artery angioplasty with stent placement        Allergies    iodinated radiocontrast agents (Rhinitis)  penicillin (Short breath; Rash)  penicillins (Anaphylaxis)    Intolerances        FAMILY HISTORY:  Family history of acute myocardial infarction  father  massive MI at 52yo        SOCIAL HISTORY:        MEDICATIONS  (STANDING):  albuterol/ipratropium for Nebulization 3 milliLiter(s) Nebulizer every 6 hours  amLODIPine   Tablet 10 milliGRAM(s) Oral daily  aspirin enteric coated 81 milliGRAM(s) Oral daily  atorvastatin 40 milliGRAM(s) Oral at bedtime  azithromycin  IVPB 500 milliGRAM(s) IV Intermittent every 24 hours  budesonide  80 MICROgram(s)/formoterol 4.5 MICROgram(s) Inhaler 2 Puff(s) Inhalation two times a day  cefTRIAXone   IVPB 1000 milliGRAM(s) IV Intermittent every 24 hours  clopidogrel Tablet 75 milliGRAM(s) Oral daily  dextrose 40% Gel 15 Gram(s) Oral once  dextrose 5%. 1000 milliLiter(s) (50 mL/Hr) IV Continuous <Continuous>  dextrose 5%. 1000 milliLiter(s) (100 mL/Hr) IV Continuous <Continuous>  dextrose 50% Injectable 25 Gram(s) IV Push once  dextrose 50% Injectable 12.5 Gram(s) IV Push once  dextrose 50% Injectable 25 Gram(s) IV Push once  furosemide   Injectable 20 milliGRAM(s) IV Push every 12 hours  glucagon  Injectable 1 milliGRAM(s) IntraMuscular once  insulin glargine Injectable (LANTUS) 22 Unit(s) SubCutaneous at bedtime  insulin lispro (ADMELOG) corrective regimen sliding scale   SubCutaneous three times a day before meals  insulin lispro (ADMELOG) corrective regimen sliding scale   SubCutaneous at bedtime  insulin lispro Injectable (ADMELOG) 10 Unit(s) SubCutaneous three times a day before meals  isosorbide   mononitrate ER Tablet (IMDUR) 60 milliGRAM(s) Oral daily  lisinopril 40 milliGRAM(s) Oral daily  metoprolol succinate  milliGRAM(s) Oral daily  nicotine -  14 mG/24Hr(s) Patch 1 Patch Transdermal daily  pantoprazole    Tablet 40 milliGRAM(s) Oral before breakfast  sodium chloride 0.9% lock flush 3 milliLiter(s) IV Push every 8 hours    MEDICATIONS  (PRN):  nicotine  Polacrilex Gum 2 milliGRAM(s) Oral every 2 hours PRN breakthrough cravings      Vital Signs Last 24 Hrs  T(C): 36.8 (11 Aug 2021 06:43), Max: 36.8 (10 Aug 2021 15:48)  T(F): 98.2 (11 Aug 2021 06:43), Max: 98.3 (10 Aug 2021 15:48)  HR: 76 (11 Aug 2021 06:43) (73 - 83)  BP: 132/53 (11 Aug 2021 06:43) (102/65 - 147/99)  BP(mean): --  RR: 18 (11 Aug 2021 06:43) (18 - 18)  SpO2: 95% (11 Aug 2021 06:43) (94% - 100%)    PHYSICAL EXAM:    GENERAL: NAD, well-groomed  HEAD:  Atraumatic, Normocephalic  EYES: EOMI, PERRLA, conjunctiva and sclera clear  ENMT: No tonsillar erythema, exudates, or enlargement; Moist mucous membranes  NECK: Supple, No JVD  CHEST/LUNG: Clear to percussion bilaterally; No rales, rhonchi, wheezing, or rubs  HEART: Regular rate and rhythm; No murmurs, rubs, or gallops  ABDOMEN: Soft, Nontender, Nondistended; Bowel sounds present  EXTREMITIES:  2+ Peripheral Pulses, No clubbing, cyanosis, or edema  LYMPH: No lymphadenopathy noted  SKIN: No rashes or lesions    LABS:  CBC Full  -  ( 10 Aug 2021 07:21 )  WBC Count : 11.35 K/uL  RBC Count : 4.47 M/uL  Hemoglobin : 13.7 g/dL  Hematocrit : 41.1 %  Platelet Count - Automated : 238 K/uL  Mean Cell Volume : 91.9 fl  Mean Cell Hemoglobin : 30.6 pg  Mean Cell Hemoglobin Concentration : 33.3 gm/dL  Auto Neutrophil # : x  Auto Lymphocyte # : x  Auto Monocyte # : x  Auto Eosinophil # : x  Auto Basophil # : x  Auto Neutrophil % : x  Auto Lymphocyte % : x  Auto Monocyte % : x  Auto Eosinophil % : x  Auto Basophil % : x          MICROBIOLOGY:    Culture - Blood (21 @ 06:35)   Specimen Source: .Blood Blood-Peripheral   Culture Results:   No growth to date.     Culture - Blood (21 @ 06:35)   - Coagulase negative Staphylococcus: Detec   Gram Stain:   Growth in aerobic bottle: Gram Positive Cocci in Clusters   Specimen Source: .Blood Blood-Peripheral   Organism: Blood Culture PCR   Culture Results:   Growth in aerobic bottle: Staphylococcus haemolyticus   Coag Negative Staphylococcus   Single set isolate, possible contaminant. Contact   Microbiology if susceptibility testing clinically   indicated.   ***Blood Panel PCR results on this specimen are available   approximately 3 hours after the Gram stain result.***   Gram stain, PCR, and/or culture results may not always   correspond due to difference in methodologies.   ************************************************************   This PCR assay was performed by multiplex PCR. This   Assay tests for 66 bacterial and resistance gene targets.   Please refer to the [facility] [employer] test directory   at [email or web address] for details.   Organism Identification: Blood Culture PCR   Method Type: PCR     COVID-19 Hamilton Domain Antibody (21 @ 13:59)   COVID-19 Hamilton Domain Antibody Result: >250.00: Roche ECLIA Total AB (JOEY)   NOTE: This result index represents a total antibody measurement, which   includes IgG, IgA and IgM.   Measures Receptor Binding Domain of the Hamilton Protein   Negative <= 0.79 U/mL   Positive >= 0.80 U/mL U/mL   COVID-19 Hamilton Domain AB Interp: Positive: This test has been authorized for emergency use by the FDA. [facility]   [employer] has validated this test to be accurate.   Results from antibody testing should not be used to inform infection   status.   A positive result is consistent with vaccination, prior infection, or   rarely be due to past or present infection with non-SARS-CoV-2   coronavirus strains, such as coronavirus HKU1, NL63, OC43, A3 or 229E.   A negative result does not rule out SARS-CoV-2 infection, particularly in   those who have been in recent contact with the virus.     COVID-19 PCR . (21 @ 07:21)   COVID-19 PCR: NotDetec: EUA/IVD   You can help in the fight against COVID-19. [email or web address] may contact   you to see if you are interested in voluntarily participating in one of   our clinical trials.   This test has been validated by [employer] to be accurate;   though it has not been FDA cleared/approved by the usual pathway   As with all laboratory test, results should be correlated with clinical   findings.             RADIOLOGY:    < from: CT Angio Chest PE Protocol w/ IV Cont (21 @ 13:23) >  FINDINGS:    LUNGS AND AIRWAYS: Patent central airways.  Moderately severe emphysema. Moderately extensive right lower lobe atelectasis. Subsegmental left lower lobe atelectasis. Interlobular septal thickening which may be secondary to pulmonary interstitial edema  PLEURA: Small bilateral pleural effusions.  MEDIASTINUM AND TREY: AP window lymph node 19 x 11 mm. Several additional small lymph nodes.  VESSELS: No pulmonary embolus. Main pulmonary artery dilated to 3.6 cm suggesting pulmonary arterial hypertension.  HEART: Heart size is borderline. No pericardial effusion. Coronary artery calcification.  CHEST WALL AND LOWER NECK: Within normal limits.  VISUALIZED UPPER ABDOMEN: 12 mmirregular hypodensity posterior liver dome.  BONES: Within normal limits.    IMPRESSION:  No pulmonary embolus.  Moderately severe emphysema.  Enlarged main pulmonary artery suggesting pulmonary arterial hypertension.  Appearance suggesting pulmonary interstitial edema    < end of copied text >        < from: Xray Chest 1 View- PORTABLE-Urgent (21 @ 06:37) >  EXAM:  XR CHEST PORTABLE URGENT 1V                            PROCEDURE DATE:  2021          INTERPRETATION:  CLINICAL STATEMENT: Chest pain.    TECHNIQUE: AP view of the chest.    COMPARISON: None available.    FINDINGS/  IMPRESSION:  Sternotomy wires noted.    Increased interstitial lung markings with nonspecific superimposed mild airspace opacities right lung base. No pleural effusion    Heart size cannot be accurately assessed in this projection, but appear enlarged.    < end of copied text >             Patient is a 61y old  Male who presents with a chief complaint of Transferred for cardiac cath (11 Aug 2021 11:53)      HPI:    60 y/o M w/ PMH of HTN, HLD, DM, CAD s/p CABG + stents, PAD s/p RLE stent + LLE POBA and Active smoker presented to [facility] ED for complaint of chest tightness and shortness of breath. Initially Patient attributed it to his hx of Anxiety. He and his wife attended a gathering with about 30 people in close proximity , both are vaccinated. No hx of sick contacts, fevers, leg swelling, palpitations.     In the ER, the Cxray shows evidence of increased markings in RLL and CT Angio shows moderate emphysema + pulm HTN and interstitial edema. Pt started on IV Ceftriaxone + Azithromycin and Blood cultures showed Gram + cocci in clusters. Pt was seen by ID, and repeat blood cultures sent.     Pt found to have elevated Troponins which are now downtrending. He is s/p Full dose Lovenox and then heparin drip. Pt also is being treated for a COPD exacerbation with duonebs + steroids. Of note patient has an IV contrast allergy and was pre-medicated with 3 doses of PO prednisone and IV Benadryl. Pt now transferred to [facility] for cardiac catheterization. (11 Aug 2021 08:36)    s/p cardiac cath on .  ID consulted for further abx managment.  Pt denies recent fever/chills/worsening cough from his baseline/abd pain/diarrhea/dysuria/rash/joint pain.          REVIEW OF SYSTEMS:    CONSTITUTIONAL: No fever, weight loss, or fatigue  EYES: No eye pain, visual disturbances, or discharge  ENMT:  No sore throat  NECK: No pain or stiffness  RESPIRATORY: No cough, wheezing, chills or hemoptysis; No shortness of breath  CARDIOVASCULAR: No chest pain, palpitations, dizziness, or leg swelling  GASTROINTESTINAL: No abdominal or epigastric pain. No nausea, vomiting, or hematemesis; No diarrhea or constipation. No melena or hematochezia.  GENITOURINARY: No dysuria, frequency, hematuria, or incontinence  NEUROLOGICAL: No headaches, memory loss, loss of strength, numbness, or tremors  SKIN: No itching, burning, rashes, or lesions   LYMPH NODES: No enlarged glands  MUSCULOSKELETAL: No joint pain or swelling; No muscle, back, or extremity pain      PAST MEDICAL & SURGICAL HISTORY:  DM (Diabetes Mellitus)    HTN (Hypertension)    Hyperlipidemia    Coronary Atherosclerosis of Native Coronary Artery    CAD (coronary artery disease)    Smoker    BPH (benign prostatic hyperplasia)    Anxiety    PAD (peripheral artery disease)    HTN (hypertension)    History of Tonsillectomy    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 ago    S/P CABG x 3   with Dr Oshea    S/P peripheral artery angioplasty with stent placement        Allergies    iodinated radiocontrast agents (Rhinitis)  penicillin (Short breath; Rash)  penicillins (Anaphylaxis)    Intolerances        FAMILY HISTORY:  Family history of acute myocardial infarction  father  massive MI at 50yo        SOCIAL HISTORY:        MEDICATIONS  (STANDING):  albuterol/ipratropium for Nebulization 3 milliLiter(s) Nebulizer every 6 hours  amLODIPine   Tablet 10 milliGRAM(s) Oral daily  aspirin enteric coated 81 milliGRAM(s) Oral daily  atorvastatin 40 milliGRAM(s) Oral at bedtime  azithromycin  IVPB 500 milliGRAM(s) IV Intermittent every 24 hours  budesonide  80 MICROgram(s)/formoterol 4.5 MICROgram(s) Inhaler 2 Puff(s) Inhalation two times a day  cefTRIAXone   IVPB 1000 milliGRAM(s) IV Intermittent every 24 hours  clopidogrel Tablet 75 milliGRAM(s) Oral daily  dextrose 40% Gel 15 Gram(s) Oral once  dextrose 5%. 1000 milliLiter(s) (50 mL/Hr) IV Continuous <Continuous>  dextrose 5%. 1000 milliLiter(s) (100 mL/Hr) IV Continuous <Continuous>  dextrose 50% Injectable 25 Gram(s) IV Push once  dextrose 50% Injectable 12.5 Gram(s) IV Push once  dextrose 50% Injectable 25 Gram(s) IV Push once  furosemide   Injectable 20 milliGRAM(s) IV Push every 12 hours  glucagon  Injectable 1 milliGRAM(s) IntraMuscular once  insulin glargine Injectable (LANTUS) 22 Unit(s) SubCutaneous at bedtime  insulin lispro (ADMELOG) corrective regimen sliding scale   SubCutaneous three times a day before meals  insulin lispro (ADMELOG) corrective regimen sliding scale   SubCutaneous at bedtime  insulin lispro Injectable (ADMELOG) 10 Unit(s) SubCutaneous three times a day before meals  isosorbide   mononitrate ER Tablet (IMDUR) 60 milliGRAM(s) Oral daily  lisinopril 40 milliGRAM(s) Oral daily  metoprolol succinate  milliGRAM(s) Oral daily  nicotine -  14 mG/24Hr(s) Patch 1 Patch Transdermal daily  pantoprazole    Tablet 40 milliGRAM(s) Oral before breakfast  sodium chloride 0.9% lock flush 3 milliLiter(s) IV Push every 8 hours    MEDICATIONS  (PRN):  nicotine  Polacrilex Gum 2 milliGRAM(s) Oral every 2 hours PRN breakthrough cravings      Vital Signs Last 24 Hrs  T(C): 36.8 (11 Aug 2021 06:43), Max: 36.8 (10 Aug 2021 15:48)  T(F): 98.2 (11 Aug 2021 06:43), Max: 98.3 (10 Aug 2021 15:48)  HR: 76 (11 Aug 2021 06:43) (73 - 83)  BP: 132/53 (11 Aug 2021 06:43) (102/65 - 147/99)  BP(mean): --  RR: 18 (11 Aug 2021 06:43) (18 - 18)  SpO2: 95% (11 Aug 2021 06:43) (94% - 100%)    PHYSICAL EXAM:    GENERAL: NAD, well-groomed  HEAD:  Atraumatic, Normocephalic  EYES: EOMI, PERRLA, conjunctiva and sclera clear  ENMT: No tonsillar erythema, exudates, or enlargement; Moist mucous membranes  NECK: Supple, No JVD  CHEST/LUNG: Clear to percussion bilaterally; No rales, rhonchi, wheezing, or rubs  HEART: Regular rate and rhythm; No murmurs, rubs, or gallops  ABDOMEN: Soft, Nontender, Nondistended; Bowel sounds present  EXTREMITIES:  2+ Peripheral Pulses, No clubbing, cyanosis, or edema  LYMPH: No lymphadenopathy noted  SKIN: No rashes or lesions    LABS:  CBC Full  -  ( 10 Aug 2021 07:21 )  WBC Count : 11.35 K/uL  RBC Count : 4.47 M/uL  Hemoglobin : 13.7 g/dL  Hematocrit : 41.1 %  Platelet Count - Automated : 238 K/uL  Mean Cell Volume : 91.9 fl  Mean Cell Hemoglobin : 30.6 pg  Mean Cell Hemoglobin Concentration : 33.3 gm/dL  Auto Neutrophil # : x  Auto Lymphocyte # : x  Auto Monocyte # : x  Auto Eosinophil # : x  Auto Basophil # : x  Auto Neutrophil % : x  Auto Lymphocyte % : x  Auto Monocyte % : x  Auto Eosinophil % : x  Auto Basophil % : x          MICROBIOLOGY:    Culture - Blood (21 @ 06:35)   Specimen Source: .Blood Blood-Peripheral   Culture Results:   No growth to date.     Culture - Blood (21 @ 06:35)   - Coagulase negative Staphylococcus: Detec   Gram Stain:   Growth in aerobic bottle: Gram Positive Cocci in Clusters   Specimen Source: .Blood Blood-Peripheral   Organism: Blood Culture PCR   Culture Results:   Growth in aerobic bottle: Staphylococcus haemolyticus   Coag Negative Staphylococcus   Single set isolate, possible contaminant. Contact   Microbiology if susceptibility testing clinically   indicated.   ***Blood Panel PCR results on this specimen are available   approximately 3 hours after the Gram stain result.***   Gram stain, PCR, and/or culture results may not always   correspond due to difference in methodologies.   ************************************************************   This PCR assay was performed by multiplex PCR. This   Assay tests for 66 bacterial and resistance gene targets.   Please refer to the [facility] [employer] test directory   at [email or web address] for details.   Organism Identification: Blood Culture PCR   Method Type: PCR     COVID-19 Hamilton Domain Antibody (21 @ 13:59)   COVID-19 Hamilton Domain Antibody Result: >250.00: Roche ECLIA Total AB (JOEY)   NOTE: This result index represents a total antibody measurement, which   includes IgG, IgA and IgM.   Measures Receptor Binding Domain of the Hamilton Protein   Negative <= 0.79 U/mL   Positive >= 0.80 U/mL U/mL   COVID-19 Hamilton Domain AB Interp: Positive: This test has been authorized for emergency use by the FDA. [employer] has validated this test to be accurate.   Results from antibody testing should not be used to inform infection   status.   A positive result is consistent with vaccination, prior infection, or   rarely be due to past or present infection with non-SARS-CoV-2   coronavirus strains, such as coronavirus HKU1, NL63, OC43, A3 or 229E.   A negative result does not rule out SARS-CoV-2 infection, particularly in   those who have been in recent contact with the virus.     COVID-19 PCR . (21 @ 07:21)   COVID-19 PCR: NotDetec: EUA/IVD   You can help in the fight against COVID-19. [employer] may contact   you to see if you are interested in voluntarily participating in one of   our clinical trials.   This test has been validated by [employer] to be accurate;   though it has not been FDA cleared/approved by the usual pathway   As with all laboratory test, results should be correlated with clinical   findings.             RADIOLOGY:    < from: CT Angio Chest PE Protocol w/ IV Cont (21 @ 13:23) >  FINDINGS:    LUNGS AND AIRWAYS: Patent central airways.  Moderately severe emphysema. Moderately extensive right lower lobe atelectasis. Subsegmental left lower lobe atelectasis. Interlobular septal thickening which may be secondary to pulmonary interstitial edema  PLEURA: Small bilateral pleural effusions.  MEDIASTINUM AND TREY: AP window lymph node 19 x 11 mm. Several additional small lymph nodes.  VESSELS: No pulmonary embolus. Main pulmonary artery dilated to 3.6 cm suggesting pulmonary arterial hypertension.  HEART: Heart size is borderline. No pericardial effusion. Coronary artery calcification.  CHEST WALL AND LOWER NECK: Within normal limits.  VISUALIZED UPPER ABDOMEN: 12 mmirregular hypodensity posterior liver dome.  BONES: Within normal limits.    IMPRESSION:  No pulmonary embolus.  Moderately severe emphysema.  Enlarged main pulmonary artery suggesting pulmonary arterial hypertension.  Appearance suggesting pulmonary interstitial edema    < end of copied text >        < from: Xray Chest 1 View- PORTABLE-Urgent (21 @ 06:37) >  EXAM:  XR CHEST PORTABLE URGENT 1V                            PROCEDURE DATE:  2021          INTERPRETATION:  CLINICAL STATEMENT: Chest pain.    TECHNIQUE: AP view of the chest.    COMPARISON: None available.    FINDINGS/  IMPRESSION:  Sternotomy wires noted.    Increased interstitial lung markings with nonspecific superimposed mild airspace opacities right lung base. No pleural effusion    Heart size cannot be accurately assessed in this projection, but appear enlarged.    < end of copied text >

## 2021-08-11 NOTE — TRANSFER ACCEPTANCE NOTE - PROBLEM SELECTOR PLAN 2
-c/w duoneb q6  -Will need symbicort and steroid taper for home  -F/u with pulm  -May need desat study to determine 02 for home if needed.  -Supplemental O2 via [place]

## 2021-08-11 NOTE — TRANSFER ACCEPTANCE NOTE - NEGATIVE NEUROLOGICAL SYMPTOMS
no transient paralysis/no weakness/no paresthesias/no generalized seizures/no focal seizures/no syncope/no tremors/no vertigo/no loss of sensation/no difficulty walking/no headache/no loss of consciousness

## 2021-08-11 NOTE — CONSULT NOTE ADULT - ASSESSMENT
62 y/o Male with medical hx significant for HTN, HLD, DM, CAD with triple vessel bypass, and active smoker presenting to the ED due to shortness of breath since early this morning, admitted for Acute Hypoxic Resp Failure 2/2 Pulm edema vs underlying Pneumonia

## 2021-08-11 NOTE — TRANSFER ACCEPTANCE NOTE - HISTORY OF PRESENT ILLNESS
60 y/o M w/ PMH of HTN, HLD, DM, CAD s/p CABG + stents, PAD s/p RLE stent + LLE POBA and Active smoker presented to [facility] ED for complaint of chest tightness and shortness of breath. Initially Patient attributed it to his hx of Anxiety. He and his wife attended a gathering with about 30 people in close proximity , both are vaccinated. No hx of sick contacts, fevers, leg swelling, palpitations. In the ER, the Cxray shows evidence of increased markings in RLL and CT Angio shows moderate emphysema + pulm HTN. Pt started on IV Ceftriaxone + Azithromycin and Blood cultures showed Gram + cocci in clusters. ID is following and repeat blood culture is pending. Pt found to have elevated Troponins which are now downtrending. He is s/p Full dose Lovenox and then heparin drip. Pt also is being treated for a COPD exacerbation with duonebs + steroids. Of note patient has an IV contrast allergy and was pre-medicated with 3 doses of PO prednisone and IV Benadryl. Pt now transferred to [facility] for cardiac catheretization.

## 2021-08-11 NOTE — TRANSFER ACCEPTANCE NOTE - ASSESSMENT
62 y/o M w/ PMH of HTN, HLD, DM, CAD s/p CABG + stents, PAD s/p RLE stent + LLE POBA and Active smoker presented to [facility] ED for complaint of chest tightness and shortness of breath    +NSTEMI  +COPD exacerbation  +RLL PNA + Bacteremia

## 2021-08-11 NOTE — CONSULT NOTE ADULT - ASSESSMENT
60 y/o M w/ PMH of HTN, HLD, DM, CAD s/p CABG + stents, PAD s/p RLE stent + LLE POBA and Active smoker presented to [facility] ED for complaint of chest tightness and shortness of breath. Initially Patient attributed it to his hx of Anxiety. He and his wife attended a gathering with about 30 people in close proximity , both are vaccinated. No hx of sick contacts, fevers, leg swelling, palpitations.     In the ER, the Cxray shows evidence of increased markings in RLL and CT Angio shows moderate emphysema + pulm HTN and interstitial edema. Pt started on IV Ceftriaxone + Azithromycin and Blood cultures showed Gram + cocci in clusters. Pt was seen by ID, and repeat blood cultures sent.     Pt found to have elevated Troponins which are now downtrending. He is s/p Full dose Lovenox and then heparin drip. Pt also is being treated for a COPD exacerbation with duonebs + steroids. Of note patient has an IV contrast allergy and was pre-medicated with 3 doses of PO prednisone and IV Benadryl. Pt now transferred to [facility] for cardiac catheterization. (11 Aug 2021 08:36)    s/p cardiac cath on 8/11.  ID consulted for further abx managment.  Pt denies recent fever/chills/worsening cough from his baseline/abd pain/diarrhea/dysuria/rash/joint pain.      CoNS bacteremia:    - This is mostl likely contaminant.  f/u repeat bcx to ensure clearance  - Will hold off on vancomycin.       SOB:    - h/o active smoking, a/w acute hypoxic resp failure due to pulmonary edema in the setting of NSTEMI  - CT chest reviewed - changes s/o emphysema, pulm HTN and edema.  No consolidations reported to suggest pna.  Pt states cough at baseline.  On empiric abx, day #4.  Recommend d/c abx in next 24 hrs.   - Monitor wbc and temp curve.  - f/u repeat bcx    NSTEMI:    - transferred to [facility] for NSTEMI, s/p cardiac cath      COPD exacerbation:    - Pulm recs appreciated, cont home meds  - Duonebs  - Smoking cessation      Will follow,    Kareen Mukherjee  [phone]

## 2021-08-11 NOTE — PROVIDER CONTACT NOTE (OTHER) - BACKGROUND
61 year old male admitted for chest pain and admitted to floor from cardiac cath lab. PMH of type 2 diabetes, CAD, HTN and COPD.

## 2021-08-11 NOTE — TRANSFER ACCEPTANCE NOTE - PROBLEM SELECTOR PLAN 3
- ID consulted to rule out true bacteremia  - Azithro for 7 days  - Ceftriaxone 5 days  - repeat Bcx pending from [facility]

## 2021-08-11 NOTE — CONSULT NOTE ADULT - SUBJECTIVE AND OBJECTIVE BOX
-- @ 10:46    Patient is a 61y old  Male who presents with a chief complaint of CP + SOB (11 Aug 2021 08:36)      HPI:  60 y/o M w/ PMH of HTN, HLD, DM, CAD s/p CABG + stents, PAD s/p RLE stent + LLE POBA and Active smoker presented to [facility] ED for complaint of chest tightness and shortness of breath. Initially Patient attributed it to his hx of Anxiety. He and his wife attended a gathering with about 30 people in close proximity , both are vaccinated. No hx of sick contacts, fevers, leg swelling, palpitations. In the ER, the Cxray shows evidence of increased markings in RLL and CT Angio shows moderate emphysema + pulm HTN. Pt started on IV Ceftriaxone + Azithromycin and Blood cultures showed Gram + cocci in clusters. ID is following and repeat blood culture is pending. Pt found to have elevated Troponins which are now downtrending. He is s/p Full dose Lovenox and then heparin drip. Pt also is being treated for a COPD exacerbation with duonebs + steroids. Of note patient has an IV contrast allergy and was pre-medicated with 3 doses of PO prednisone and IV Benadryl. Pt now transferred to [facility] for cardiac catheretization. (11 Aug 2021 08:36)      he says he is an active smoker:  he smoked till 3 days ago:  he has copd : b and he has tried multiple inhalers before but have not been using it now:   he has no cough or phlegm : he never had covid and he took covid vaccination       ?FOLLOWING PRESENT  [x ] Hx of PE/DVT, [y ] Hx COPD, [ x] Hx of Asthma, [y ] Hx of Hospitalization, [ x]  Hx of BiPAP/CPAP use, [ x] Hx of DANIEL    Allergies    iodinated radiocontrast agents (Rhinitis)  penicillin (Short breath; Rash)  penicillins (Anaphylaxis)    Intolerances        PAST MEDICAL & SURGICAL HISTORY:  DM (Diabetes Mellitus)    HTN (Hypertension)    Hyperlipidemia    Coronary Atherosclerosis of Native Coronary Artery    CAD (coronary artery disease)    Smoker    BPH (benign prostatic hyperplasia)    Anxiety    PAD (peripheral artery disease)    HTN (hypertension)    History of Tonsillectomy    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 ago    S/P CABG x 3   with Dr Graver    S/P peripheral artery angioplasty with stent placement        FAMILY HISTORY:  Family history of acute myocardial infarction  father  massive MI at 50yo        Social History: [  45 yrs: active] TOBACCO                  [ x ] ETOH                                 [  x] IVDA/DRUGS    REVIEW OF SYSTEMS      General:	x    Skin/Breast:x  	  Ophthalmologic:x  	  ENMT:	  x  Respiratory and Thorax:  sob, cough, mild phelgm   	  Cardiovascular:	  x  Gastrointestinal:	x    Genitourinary:	x    Musculoskeletal:	x    Neurological:	x    Psychiatric:	x    Hematology/Lymphatics:	x    Endocrine:	x    Allergic/Immunologic:	x    MEDICATIONS  (STANDING):  albuterol/ipratropium for Nebulization 3 milliLiter(s) Nebulizer every 6 hours  amLODIPine   Tablet 10 milliGRAM(s) Oral daily  aspirin enteric coated 81 milliGRAM(s) Oral daily  atorvastatin 40 milliGRAM(s) Oral at bedtime  budesonide  80 MICROgram(s)/formoterol 4.5 MICROgram(s) Inhaler 2 Puff(s) Inhalation two times a day  clopidogrel Tablet 75 milliGRAM(s) Oral daily  dextrose 40% Gel 15 Gram(s) Oral once  dextrose 5%. 1000 milliLiter(s) (50 mL/Hr) IV Continuous <Continuous>  dextrose 5%. 1000 milliLiter(s) (100 mL/Hr) IV Continuous <Continuous>  dextrose 50% Injectable 25 Gram(s) IV Push once  dextrose 50% Injectable 12.5 Gram(s) IV Push once  dextrose 50% Injectable 25 Gram(s) IV Push once  furosemide   Injectable 20 milliGRAM(s) IV Push every 12 hours  glucagon  Injectable 1 milliGRAM(s) IntraMuscular once  insulin glargine Injectable (LANTUS) 22 Unit(s) SubCutaneous at bedtime  insulin lispro (ADMELOG) corrective regimen sliding scale   SubCutaneous three times a day before meals  insulin lispro (ADMELOG) corrective regimen sliding scale   SubCutaneous at bedtime  insulin lispro Injectable (ADMELOG) 10 Unit(s) SubCutaneous three times a day before meals  isosorbide   mononitrate ER Tablet (IMDUR) 60 milliGRAM(s) Oral daily  lisinopril 40 milliGRAM(s) Oral daily  metoprolol succinate  milliGRAM(s) Oral daily  nicotine -  14 mG/24Hr(s) Patch 1 Patch Transdermal daily  pantoprazole    Tablet 40 milliGRAM(s) Oral before breakfast  sodium chloride 0.9% lock flush 3 milliLiter(s) IV Push every 8 hours    MEDICATIONS  (PRN):  nicotine  Polacrilex Gum 2 milliGRAM(s) Oral every 2 hours PRN breakthrough cravings       Vital Signs Last 24 Hrs  T(C): 36.8 (11 Aug 2021 06:43), Max: 37 (10 Aug 2021 11:04)  T(F): 98.2 (11 Aug 2021 06:43), Max: 98.6 (10 Aug 2021 11:04)  HR: 76 (11 Aug 2021 06:43) (73 - 83)  BP: 132/53 (11 Aug 2021 06:43) (102/65 - 147/99)  BP(mean): --  RR: 18 (11 Aug 2021 06:43) (18 - 18)  SpO2: 95% (11 Aug 2021 06:43) (94% - 100%)Orthostatic VS          I&O's Summary      Physical Exam:   GENERAL: NAD, well-groomed, well-developed  HEENT: DAVE/   Atraumatic, Normocephalic  ENMT: No tonsillar erythema, exudates, or enlargement; Moist mucous membranes, Good dentition, No lesions  NECK: Supple, No JVD, Normal thyroid  CHEST/LUNG: no wheezing  CVS: Regular rate and rhythm; No murmurs, rubs, or gallops  GI: : Soft, Nontender, Nondistended; Bowel sounds present  NERVOUS SYSTEM:  Alert & Oriented X3  EXTREMITIES:  - edema  LYMPH: No lymphadenopathy noted  SKIN: No rashes or lesions  ENDOCRINOLOGY: No Thyromegaly  PSYCH: Appropriate    Labs:  ABG - ( 09 Aug 2021 11:34 )  pH, Arterial: 7.45  pH, Blood: x     /  pCO2: 31    /  pO2: 62    / HCO3: 22    / Base Excess: -1.6  /  SaO2: 94              COVID-19 PCR: NotDetec (08 Aug 2021 07:21)    CARDIAC MARKERS ( 10 Aug 2021 00:16 )  3.350 ng/mL / x     / x     / x     / x      CARDIAC MARKERS ( 09 Aug 2021 20:16 )  4.910 ng/mL / x     / x     / x     / x                                13.7   11.35 )-----------( 238      ( 10 Aug 2021 07:21 )             41.1                         13.2   8.55  )-----------( 209      ( 10 Aug 2021 00:15 )             38.3                         14.7   15.53 )-----------( 259      ( 09 Aug 2021 08:47 )             44.3                         15.0   15.31 )-----------( 251      ( 08 Aug 2021 07:21 )             44.5     08-09    139  |  104  |  26<H>  ----------------------------<  294<H>  4.9   |  25  |  1.15  08-08    138  |  105  |  20<H>  ----------------------------<  300<H>  4.5   |  25  |  1.03      TPro  7.1  /  Alb  3.6  /  TBili  0.6  /  DBili  x   /  AST  38  /  ALT  44  /  AlkPhos  84  08    CAPILLARY BLOOD GLUCOSE      POCT Blood Glucose.: 430 mg/dL (11 Aug 2021 06:49)  POCT Blood Glucose.: 331 mg/dL (10 Aug 2021 20:46)  POCT Blood Glucose.: 477 mg/dL (10 Aug 2021 16:30)  POCT Blood Glucose.: 397 mg/dL (10 Aug 2021 11:53)      PTT - ( 11 Aug 2021 03:08 )  PTT:93.6 sec    Culture - Blood (collected 09 Aug 2021 06:35)  Source: .Blood Blood-Peripheral  Preliminary Report (10 Aug 2021 07:02):    No growth to date.    Culture - Blood (collected 09 Aug 2021 06:35)  Source: .Blood Blood-Peripheral  Gram Stain (09 Aug 2021 22:20):    Growth in aerobic bottle: Gram Positive Cocci in Clusters  Final Report (10 Aug 2021 20:37):    Growth in aerobic bottle: Staphylococcus haemolyticus    Coag Negative Staphylococcus    Single set isolate, possible contaminant. Contact    Microbiology if susceptibility testing clinically    indicated.    ***Blood Panel PCR results on this specimen are available    approximately 3 hours after the Gram stain result.***    Gram stain, PCR, and/or culture results may not always    correspond due to difference in methodologies.    ************************************************************    This PCR assay was performed by multiplex PCR. This    Assay tests for 66 bacterial and resistance gene targets.    Please refer to the [facility] test directory    at [email or web address] for details.  Organism: Blood Culture PCR (10 Aug 2021 20:37)  Organism: Blood Culture PCR (10 Aug 2021 20:37)      D DImer  Procalcitonin, Serum: 0.11 ng/mL ( @ 03:44)  D-Dimer Assay, Quantitative: 288 ng/mL DDU ( @ 07:21)      Studies  Chest X-RAY  CT SCAN Chest   CT Abdomen  Venous Dopplers: LE:   Others        < from: CT Angio Chest PE Protocol w/ IV Cont (21 @ 13:23) >  PROCEDURE DATE:  2021          INTERPRETATION:  CLINICAL INFORMATION: 61-year-old man with shortness of breath and substernal chest pain. History of CABG and smoking.    COMPARISON: None.    CONTRAST/COMPLICATIONS:  IV Contrast: Omnipaque 350  50 cc administered   50 cc discarded  Oral Contrast: NONE  Complications: None reported at time of study completion    PROCEDURE:  CT Angiography of the Chest.  Sagittal and coronal reformats were performed as well as 3D (MIP) reconstructions.    FINDINGS:    LUNGS AND AIRWAYS: Patent central airways.  Moderately severe emphysema. Moderately extensive right lower lobe atelectasis. Subsegmental left lower lobe atelectasis. Interlobular septal thickening which may be secondary to pulmonary interstitial edema  PLEURA: Small bilateral pleural effusions.  MEDIASTINUM AND TREY: AP window lymph node 19 x 11 mm. Several additional small lymph nodes.  VESSELS: No pulmonary embolus. Main pulmonary artery dilated to 3.6 cm suggesting pulmonary arterial hypertension.  HEART: Heart size is borderline. No pericardial effusion. Coronary artery calcification.  CHEST WALL AND LOWER NECK: Within normal limits.  VISUALIZED UPPER ABDOMEN: 12 mmirregular hypodensity posterior liver dome.  BONES: Within normal limits.    IMPRESSION:  No pulmonary embolus.  Moderately severe emphysema.  Enlarged main pulmonary artery suggesting pulmonary arterial hypertension.  Appearance suggesting pulmonaryinterstitial edema        --- End of Report ---            VANIA ARAYA MD; Attending Radiologist  This document has been electronically signed. Aug  8 2021  2:02PM    < end of copied text >

## 2021-08-11 NOTE — TRANSFER ACCEPTANCE NOTE - PROBLEM SELECTOR PLAN 1
Continue ASA + Plavix + Statin + BB  [facility] today  Pt received pre-medication for IV contrast allergy  Monitor on telemetry

## 2021-08-11 NOTE — TRANSFER ACCEPTANCE NOTE - NSICDXPASTMEDICALHX_GEN_ALL_CORE_FT
PAST MEDICAL HISTORY:  Anxiety     BPH (benign prostatic hyperplasia)     CAD (coronary artery disease)     Coronary Atherosclerosis of Native Coronary Artery     DM (Diabetes Mellitus)     HTN (Hypertension)     HTN (hypertension)     Hyperlipidemia     PAD (peripheral artery disease)     Smoker

## 2021-08-11 NOTE — CHART NOTE - NSCHARTNOTEFT_GEN_A_CORE
Urgent consult request received for uncontrolled DM2, requested by Dr. Price.     Briefly, Pt is a 62 y/o M w/ PMH of HTN, HLD, DM, CAD s/p CABG + stents, PAD s/p RLE stent + LLE POBA and Active smoker who presented to [facility] ED for complaint of chest tightness and shortness of breath. Pt started on treatment for COPD exacerbation w/ steroids and PNA w/ IV Ceftriaxone + Azithromycin there. Later found to have +Blood cultures w/ Gram + cocci in clusters and elevated troponins, transferred over to [facility] for cardiac catheterization. Prior to transfer, patient received IV steroids for COPD ex (at least 1 dose of hydrocortisone 100mg IV given 8/11 AM per review of chart) as well as 3 doses of po prednisone for IV Contrast Allergy Pre medication.   Endocrine consulted for hyperglycemia & uncontrolled DM Type 2 per discussion w/ ACP.     FS in 400s.   A1c 7.7%   Ph 7.40 on VBG drawn recently   No AG or low bicarb on AM BMP; GFR 80-92   Weight 83.6kg     Now s/p LHC per discussion w/ ACP Carly and no further doses of steroids ordered at this time or planned to be given. Not planned to be NPO. Currently ordered for Carb Consistent Diet     CAPILLARY BLOOD GLUCOSE  POCT Blood Glucose.: 421 mg/dL (11 Aug 2021 18:09)  POCT Blood Glucose.: 469 mg/dL (11 Aug 2021 18:07)  POCT Blood Glucose.: 426 mg/dL (11 Aug 2021 16:43) (10A +12A correction)  POCT Blood Glucose.: 402 mg/dL (11 Aug 2021 16:42)   POCT Blood Glucose.: 355 mg/dL (11 Aug 2021 10:53) (5A correction)   POCT Blood Glucose.: 430 mg/dL (11 Aug 2021 06:49)  POCT Blood Glucose.: 331 mg/dL (10 Aug 2021 20:46)    Home Medications:  HumaLOG 100 units/mL subcutaneous solution: 5 unit(s) subcutaneous 3 times a day (with meals) and sliding scale (11 Aug 2021 08:40)  Toujeo SoloStar 300 units/mL subcutaneous solution: 18 unit(s) subcutaneous once a day (at bedtime) (11 Aug 2021 08:40)    Total home daily dose of insulin per provided home DM regimen/med rec only 33 units   Currently ordered for Lantus 30 SC QHS + Admelog 15 units TIDAC = TDD of 75 units SC daily w/ moderate premeal correction & low bedtime correction   Hyperglycemia likely secondary to IV hydrocortisone + PO Prednisone x 3 doses that patient received prior to transfer to [facility]   Given patient is not planned to be continued on IV or PO steroids at this time and given patient has already received 27 units of Admelog per review of MAR summary, would advise:     - Discontinue Lantus 30 SC QHS & Admleog 15 units SC TIDAC   - Lantus 20 units SC STAT   - Admelog 10 units SC TIDAC/Premeal HOLD IF NPO/NOT TOLERATING PO INTAKE   - Admelog Moderate Correction Scale Premeal & Moderate Correction Scale Bedtime   - FS BG Monitoring Premeal/TIDAC & Bedtime   - Hypoglycemia protocol  - Full Endocrine Consult to follow tomorrow     Recommendations discussed with Primary Team ANEL Carroll (pager [phone]).     Missy Apple DO   Endocrinology Fellow   Pager [phone]  Please call [phone] after hours and on weekends/holidays.
Status post Cath, Right femoral site without bleeding or hematoma.  Dressing is intact.  Positive Pulses.  Will continue to monitor.                                                                                                                                                  ZION Francois, RPA-C [id number]

## 2021-08-11 NOTE — TRANSFER ACCEPTANCE NOTE - NSICDXPASTSURGICALHX_GEN_ALL_CORE_FT
PAST SURGICAL HISTORY:  excision of Benign Tumor of Skin- right eye brow- > 2 yrs. ago     History of Tonsillectomy     S/P CABG x 3 2014 with Dr Oshea    S/P peripheral artery angioplasty with stent placement

## 2021-08-12 DIAGNOSIS — E78.5 HYPERLIPIDEMIA, UNSPECIFIED: ICD-10-CM

## 2021-08-12 DIAGNOSIS — E10.65 TYPE 1 DIABETES MELLITUS WITH HYPERGLYCEMIA: ICD-10-CM

## 2021-08-12 DIAGNOSIS — E10.69 TYPE 1 DIABETES MELLITUS WITH OTHER SPECIFIED COMPLICATION: ICD-10-CM

## 2021-08-12 LAB
ANION GAP SERPL CALC-SCNC: 16 MMOL/L — HIGH (ref 7–14)
BUN SERPL-MCNC: 24 MG/DL — HIGH (ref 7–23)
CALCIUM SERPL-MCNC: 8.9 MG/DL — SIGNIFICANT CHANGE UP (ref 8.4–10.5)
CHLORIDE SERPL-SCNC: 102 MMOL/L — SIGNIFICANT CHANGE UP (ref 98–107)
CO2 SERPL-SCNC: 22 MMOL/L — SIGNIFICANT CHANGE UP (ref 22–31)
CREAT SERPL-MCNC: 0.88 MG/DL — SIGNIFICANT CHANGE UP (ref 0.5–1.3)
GLUCOSE SERPL-MCNC: 179 MG/DL — HIGH (ref 70–99)
GRAM STN FLD: SIGNIFICANT CHANGE UP
HCT VFR BLD CALC: 36.6 % — LOW (ref 39–50)
HGB BLD-MCNC: 12.5 G/DL — LOW (ref 13–17)
MAGNESIUM SERPL-MCNC: 2.2 MG/DL — SIGNIFICANT CHANGE UP (ref 1.6–2.6)
MCHC RBC-ENTMCNC: 31 PG — SIGNIFICANT CHANGE UP (ref 27–34)
MCHC RBC-ENTMCNC: 34.2 GM/DL — SIGNIFICANT CHANGE UP (ref 32–36)
MCV RBC AUTO: 90.8 FL — SIGNIFICANT CHANGE UP (ref 80–100)
NRBC # BLD: 0 /100 WBCS — SIGNIFICANT CHANGE UP
NRBC # FLD: 0 K/UL — SIGNIFICANT CHANGE UP
PHOSPHATE SERPL-MCNC: 4.9 MG/DL — HIGH (ref 2.5–4.5)
PLATELET # BLD AUTO: 229 K/UL — SIGNIFICANT CHANGE UP (ref 150–400)
POTASSIUM SERPL-MCNC: 3.7 MMOL/L — SIGNIFICANT CHANGE UP (ref 3.5–5.3)
POTASSIUM SERPL-SCNC: 3.7 MMOL/L — SIGNIFICANT CHANGE UP (ref 3.5–5.3)
RBC # BLD: 4.03 M/UL — LOW (ref 4.2–5.8)
RBC # FLD: 13.2 % — SIGNIFICANT CHANGE UP (ref 10.3–14.5)
SODIUM SERPL-SCNC: 140 MMOL/L — SIGNIFICANT CHANGE UP (ref 135–145)
WBC # BLD: 12.96 K/UL — HIGH (ref 3.8–10.5)
WBC # FLD AUTO: 12.96 K/UL — HIGH (ref 3.8–10.5)

## 2021-08-12 PROCEDURE — 99223 1ST HOSP IP/OBS HIGH 75: CPT | Mod: GC

## 2021-08-12 RX ORDER — IPRATROPIUM/ALBUTEROL SULFATE 18-103MCG
3 AEROSOL WITH ADAPTER (GRAM) INHALATION EVERY 6 HOURS
Refills: 0 | Status: DISCONTINUED | OUTPATIENT
Start: 2021-08-12 | End: 2021-08-13

## 2021-08-12 RX ORDER — INSULIN LISPRO 100/ML
8 VIAL (ML) SUBCUTANEOUS
Refills: 0 | Status: DISCONTINUED | OUTPATIENT
Start: 2021-08-12 | End: 2021-08-13

## 2021-08-12 RX ORDER — INSULIN LISPRO 100/ML
VIAL (ML) SUBCUTANEOUS
Refills: 0 | Status: DISCONTINUED | OUTPATIENT
Start: 2021-08-12 | End: 2021-08-13

## 2021-08-12 RX ORDER — INSULIN GLARGINE 100 [IU]/ML
18 INJECTION, SOLUTION SUBCUTANEOUS AT BEDTIME
Refills: 0 | Status: DISCONTINUED | OUTPATIENT
Start: 2021-08-12 | End: 2021-08-13

## 2021-08-12 RX ORDER — INSULIN LISPRO 100/ML
VIAL (ML) SUBCUTANEOUS AT BEDTIME
Refills: 0 | Status: DISCONTINUED | OUTPATIENT
Start: 2021-08-12 | End: 2021-08-13

## 2021-08-12 RX ADMIN — Medication 1 PATCH: at 11:49

## 2021-08-12 RX ADMIN — SODIUM CHLORIDE 3 MILLILITER(S): 9 INJECTION INTRAMUSCULAR; INTRAVENOUS; SUBCUTANEOUS at 08:00

## 2021-08-12 RX ADMIN — AZITHROMYCIN 255 MILLIGRAM(S): 500 TABLET, FILM COATED ORAL at 12:39

## 2021-08-12 RX ADMIN — Medication 3 MILLILITER(S): at 10:32

## 2021-08-12 RX ADMIN — Medication 20 MILLIGRAM(S): at 22:03

## 2021-08-12 RX ADMIN — LISINOPRIL 40 MILLIGRAM(S): 2.5 TABLET ORAL at 05:48

## 2021-08-12 RX ADMIN — BUDESONIDE AND FORMOTEROL FUMARATE DIHYDRATE 2 PUFF(S): 160; 4.5 AEROSOL RESPIRATORY (INHALATION) at 22:02

## 2021-08-12 RX ADMIN — SODIUM CHLORIDE 3 MILLILITER(S): 9 INJECTION INTRAMUSCULAR; INTRAVENOUS; SUBCUTANEOUS at 22:04

## 2021-08-12 RX ADMIN — Medication 81 MILLIGRAM(S): at 12:17

## 2021-08-12 RX ADMIN — CEFTRIAXONE 100 MILLIGRAM(S): 500 INJECTION, POWDER, FOR SOLUTION INTRAMUSCULAR; INTRAVENOUS at 11:50

## 2021-08-12 RX ADMIN — SODIUM CHLORIDE 3 MILLILITER(S): 9 INJECTION INTRAMUSCULAR; INTRAVENOUS; SUBCUTANEOUS at 16:06

## 2021-08-12 RX ADMIN — Medication 1 PATCH: at 19:43

## 2021-08-12 RX ADMIN — HEPARIN SODIUM 5000 UNIT(S): 5000 INJECTION INTRAVENOUS; SUBCUTANEOUS at 05:48

## 2021-08-12 RX ADMIN — ISOSORBIDE MONONITRATE 60 MILLIGRAM(S): 60 TABLET, EXTENDED RELEASE ORAL at 11:53

## 2021-08-12 RX ADMIN — Medication 2: at 07:57

## 2021-08-12 RX ADMIN — Medication 100 MILLIGRAM(S): at 05:48

## 2021-08-12 RX ADMIN — Medication 10 UNIT(S): at 11:51

## 2021-08-12 RX ADMIN — AMLODIPINE BESYLATE 10 MILLIGRAM(S): 2.5 TABLET ORAL at 05:48

## 2021-08-12 RX ADMIN — HEPARIN SODIUM 5000 UNIT(S): 5000 INJECTION INTRAVENOUS; SUBCUTANEOUS at 17:14

## 2021-08-12 RX ADMIN — BUDESONIDE AND FORMOTEROL FUMARATE DIHYDRATE 2 PUFF(S): 160; 4.5 AEROSOL RESPIRATORY (INHALATION) at 09:50

## 2021-08-12 RX ADMIN — PANTOPRAZOLE SODIUM 40 MILLIGRAM(S): 20 TABLET, DELAYED RELEASE ORAL at 05:47

## 2021-08-12 RX ADMIN — Medication 20 MILLIGRAM(S): at 09:52

## 2021-08-12 RX ADMIN — Medication 1 PATCH: at 07:00

## 2021-08-12 RX ADMIN — INSULIN GLARGINE 18 UNIT(S): 100 INJECTION, SOLUTION SUBCUTANEOUS at 22:01

## 2021-08-12 RX ADMIN — ATORVASTATIN CALCIUM 40 MILLIGRAM(S): 80 TABLET, FILM COATED ORAL at 22:02

## 2021-08-12 RX ADMIN — CLOPIDOGREL BISULFATE 75 MILLIGRAM(S): 75 TABLET, FILM COATED ORAL at 11:53

## 2021-08-12 NOTE — CONSULT NOTE ADULT - SUBJECTIVE AND OBJECTIVE BOX
HPI:  62 y/o M w/ PMH of HTN, HLD, DM1, CAD s/p CABG + stents, PAD s/p stents, who presented to [facility] for chest pain. Patient initially treated for COPD exacerbation with steroids. However he was noted to have elevated troponin and was transferred to [facility] for cardiac catheterization which he underwent 21. Patient noted to have hyperglycemia 2/2 to receiving steroids. Endocrine consulted for further management.     Endocrine/Interval History:  Patient diagnosed with DM1 when he was 18 years old.  Currently on Toujeo 18-21 units qhs and Humalog 8 units AC meals.  Compliant with regimen  Fingersticks ~140-150 at home  No neuropathy  + Detached retina  Patient follows with Endocrinologist Dr. Mims    On admission at [facility] patient received steroids given concerns for COPD exacerbation--He received Solumedrol.  On  patient received hydrocortisone 100mg IV.  In addition, he received 3 doses of prednisone 50 for pretreatment of contrast allergy-- Last dose received on  at 7:00am  Patient subsequently noted to have elevated BG ~400. Basal/Bolus regimen started and adjusted with significant improvement in BG levels.  BG ~140. Patient tolerating PO. No n/v/d, abdominal pain.      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 ago  S/P CABG x 3   with Dr Oshea  S/P peripheral artery angioplasty with stent placement    FAMILY HISTORY:  Family history of acute myocardial infarction  father  massive MI at 52yo    Social History:  Drinks EtOH occasionally  No illicit drug use  Currently uses tobacco    Outpatient Medication:  amLODIPine 10 mg oral tablet: 1 tab(s) orally once a day  Aspir 81 oral delayed release tablet: 1 tab(s) orally once a day  clopidogrel 75 mg oral tablet: 1 tab(s) orally once a day  HumaLOG 100 units/mL subcutaneous solution: 5 unit(s) subcutaneous 3 times a day (with meals) and sliding scale  isosorbide mononitrate 60 mg oral tablet, extended release: 1 tab(s) orally once a day (in the morning)  lisinopril 40 mg oral tablet: 1 tab(s) orally once a day  metoprolol succinate 100 mg oral tablet, extended release: 1 tab(s) orally once a day  pravastatin 40 mg oral tablet: 1 tab(s) orally once a day  Toujeo SoloStar 300 units/mL subcutaneous solution: 18 unit(s) subcutaneous once a day (at bedtime)  Vitamin C 1000 mg oral tablet: 1 tab(s) orally once a day  Vitamin D2 1.25 mg (50,000 intl units) oral capsule: 1 cap(s) orally once a week    MEDICATIONS  (STANDING):  amLODIPine   Tablet 10 milliGRAM(s) Oral daily  aspirin enteric coated 81 milliGRAM(s) Oral daily  atorvastatin 40 milliGRAM(s) Oral at bedtime  budesonide  80 MICROgram(s)/formoterol 4.5 MICROgram(s) Inhaler 2 Puff(s) Inhalation two times a day  clopidogrel Tablet 75 milliGRAM(s) Oral daily  dextrose 40% Gel 15 Gram(s) Oral once  dextrose 5%. 1000 milliLiter(s) (50 mL/Hr) IV Continuous <Continuous>  dextrose 5%. 1000 milliLiter(s) (100 mL/Hr) IV Continuous <Continuous>  dextrose 50% Injectable 25 Gram(s) IV Push once  dextrose 50% Injectable 12.5 Gram(s) IV Push once  dextrose 50% Injectable 25 Gram(s) IV Push once  furosemide   Injectable 20 milliGRAM(s) IV Push every 12 hours  glucagon  Injectable 1 milliGRAM(s) IntraMuscular once  heparin   Injectable 5000 Unit(s) SubCutaneous every 12 hours  insulin glargine Injectable (LANTUS) 20 Unit(s) SubCutaneous at bedtime  insulin lispro (ADMELOG) corrective regimen sliding scale   SubCutaneous three times a day before meals  insulin lispro (ADMELOG) corrective regimen sliding scale   SubCutaneous at bedtime  insulin lispro Injectable (ADMELOG) 10 Unit(s) SubCutaneous three times a day before meals  isosorbide   mononitrate ER Tablet (IMDUR) 60 milliGRAM(s) Oral daily  lisinopril 40 milliGRAM(s) Oral daily  metoprolol succinate  milliGRAM(s) Oral daily  nicotine -  14 mG/24Hr(s) Patch 1 Patch Transdermal daily  pantoprazole    Tablet 40 milliGRAM(s) Oral before breakfast  sodium chloride 0.9% lock flush 3 milliLiter(s) IV Push every 8 hours    MEDICATIONS  (PRN):  albuterol/ipratropium for Nebulization 3 milliLiter(s) Nebulizer every 6 hours PRN Shortness of Breath and/or Wheezing  nicotine  Polacrilex Gum 2 milliGRAM(s) Oral every 2 hours PRN breakthrough cravings    Allergies  iodinated radiocontrast agents (Rhinitis)  penicillin (Short breath; Rash)  penicillins (Anaphylaxis)    Review of Systems:  General: no fevers, chills, poor appetite, or weight loss  HEENT: no blurry vision, no headache  CV: no chest pain, no palpitations  Pulm: no SOB, no cough  GI: no n/v/d, no abdominal pain  : no dysuria, no hematuria  Skin: no rash or ulcers  Endocrine: no polyuria, polydipsia  Neuro: no tremors  Psych: no depressed mood    Physical exam  VITALS: T(C): 36.7 (21 @ 11:32)  T(F): 98 (21 @ 11:32), Max: 98.4 (21 @ 05:51)  HR: 75 (21 @ 11:32) (63 - 75)  BP: 139/70 (21 @ 11:32) (115/58 - 139/70)  RR:  (16 - 16)  SpO2:  (93% - 100%)  Wt(kg): 83  General: NAD, well-developed  Eyes: no proptosis, anicteric  HEENT: atraumatic, normocephalic, MMM  Thyroid: normal size, no palpable nodules  CV: normal rate, regular rhythm, + peripheral edema  Pulm: CTA in anterior lung fields, no wheezes, rales, rhonchi  Abd: +BS, soft and non-tender to palpation  Ext: warm, dry, 2+ radial pulse  Skin: no rash, ulcers  Neuro: A&Ox3, no gross deficits appreciated  Psych: reactive affect, euthymic mood    POCT Blood Glucose.: 122 mg/dL (21 @ 16:03)  POCT Blood Glucose.: 140 mg/dL (21 @ 11:26)  POCT Blood Glucose.: 184 mg/dL (21 @ 07:54)  POCT Blood Glucose.: 216 mg/dL (21 @ 21:20)  POCT Blood Glucose.: 421 mg/dL (21 @ 18:09)  POCT Blood Glucose.: 469 mg/dL (21 @ 18:07)  POCT Blood Glucose.: 426 mg/dL (21 @ 16:43)  POCT Blood Glucose.: 402 mg/dL (21 @ 16:42)  POCT Blood Glucose.: 355 mg/dL (21 @ 10:53)  POCT Blood Glucose.: 430 mg/dL (21 @ 06:49)  POCT Blood Glucose.: 331 mg/dL (08-10-21 @ 20:46)  POCT Blood Glucose.: 477 mg/dL (08-10-21 @ 16:30)  POCT Blood Glucose.: 397 mg/dL (08-10-21 @ 11:53)  POCT Blood Glucose.: 431 mg/dL (08-10-21 @ 08:01)  POCT Blood Glucose.: 435 mg/dL (08-10-21 @ 05:20)  POCT Blood Glucose.: 378 mg/dL (21 @ 21:00)                            12.5   12.96 )-----------( 229      ( 12 Aug 2021 07:23 )             36.6       08-12    140  |  102  |  24<H>  ----------------------------<  179<H>  3.7   |  22  |  0.88    EGFR if : 106  EGFR if non : 91    Ca    8.9      08-12  Mg     2.20     08-12  Phos  4.9     08-12    HbA1c 7.7

## 2021-08-12 NOTE — CONSULT NOTE ADULT - ATTENDING COMMENTS
Type 1 DM complicated by steroid induced severe hyperglycemia.  Now off steroid, glucose improvement noted.  Agree with basal bolus insulin plan as outlined.  Endocrine team consulted for uncontrolled diabetes. Patient is high risk with high level decision making due to uncontrolled diabetes which places patient at high risk for cardiovascular and cerebrovascular events. Patient with lability of glucose requiring close monitoring and insulin adjustments.    Joe Lester MD  Division of Endocrinology  Pager: [phone]    If after 6PM or before 9AM, or on weekends/holidays, please call endocrine answering service for assistance ([phone]).  For nonurgent matters email [email or web address] for assistance.

## 2021-08-12 NOTE — CONSULT NOTE ADULT - ASSESSMENT
60 y/o M w/ PMH of HTN, HLD, DM1, CAD s/p CABG + stents, PAD s/p stents, who presented to [facility] for chest pain. Patient initially treated for COPD exacerbation with steroids. However he was noted to have elevated troponin and was transferred to [facility] for cardiac catheterization which he underwent 8/11/21. Patient noted to have hyperglycemia 2/2 to receiving steroids. Endocrine consulted for further management.     Problem 1: DM1 c/b steroid induced hyperglycemia  Home Regimen: Toujeo 18-21 units qhs and Humalog 8 units AC meals.  On 8/11 patient received hydrocortisone 100mg IV.  In addition, he received 3 doses of prednisone 50 for pretreatment of contrast allergy-- Last dose received on 8/11 at 7:00am  No further steroids ordered at this time.  - Lantus qhs***  - Lispro AC meals (Hold if patient is NPO)***  - Low dose correctional scale before meals and low dose qhs  - Monitor FS before meals and qhs (Inpatient Goal 100-180 mg/dl)  - Consistent Carb Diet    Discharge Plan:   - Patient can be discharged on a basal/bolus regimen when medically ready- dose TBD  - Patient can f/u with Endocrinologist Dr. Mims    Problem 2: HTN  - Currently well controlled (Goal <130/80)  - Management per primary team    Problem 3: HLD  - Continue statin    Kalee Robert M.D  Endocrine Fellow  Pager #[phone]   62 y/o M w/ PMH of HTN, HLD, DM1, CAD s/p CABG + stents, PAD s/p stents, who presented to [facility] for chest pain. Patient initially treated for COPD exacerbation with steroids. However he was noted to have elevated troponin and was transferred to [facility] for cardiac catheterization which he underwent 8/11/21. Patient noted to have hyperglycemia 2/2 to receiving steroids. Endocrine consulted for further management.     Problem 1: DM1 c/b steroid induced hyperglycemia  Home Regimen: Toujeo 18-21 units qhs and Humalog 8 units AC meals.  On 8/11 patient received hydrocortisone 100mg IV.  In addition, he received 3 doses of prednisone 50 for pretreatment of contrast allergy-- Last dose received on 8/11 at 7:00am  No further steroids ordered at this time.  - Decrease Lantus to 18 units qhs  - Decrease Admelog to 8 units AC meals (Hold if patient is NPO)***  - Low dose correctional scale before meals and low dose qhs  - Monitor FS before meals and qhs (Inpatient Goal 100-180 mg/dl)  - Consistent Carb Diet    Discharge Plan:   - Patient can be discharged on a basal/bolus regimen when medically ready- dose TBD  - Patient can f/u with Endocrinologist Dr. Mims    Problem 2: HTN  - Currently well controlled (Goal <130/80)  - Management per primary team    Problem 3: HLD  - Continue statin    Kalee Robert M.D  Endocrine Fellow  Pager #[phone]   62 y/o M w/ PMH of HTN, HLD, DM1, CAD s/p CABG + stents, PAD s/p stents, who presented to [facility] for chest pain. Patient initially treated for COPD exacerbation with steroids. However he was noted to have elevated troponin and was transferred to [facility] for cardiac catheterization which he underwent 8/11/21. Patient noted to have hyperglycemia 2/2 to receiving steroids. Endocrine consulted for further management.     Problem 1: DM1 c/b steroid induced hyperglycemia  Home Regimen: Toujeo 18-21 units qhs and Humalog 8 units AC meals.  On 8/11 patient received hydrocortisone 100mg IV.  In addition, he received 3 doses of prednisone 50 for pretreatment of contrast allergy-- Last dose received on 8/11 at 7:00am  No further steroids ordered at this time.  - Decrease Lantus to 18 units qhs  - Decrease Admelog to 8 units AC meals (Hold if patient is NPO)  - Low dose correctional scale before meals and low dose qhs  - Monitor FS before meals and qhs (Inpatient Goal 100-180 mg/dl)  - Consistent Carb Diet    Discharge Plan:   - Patient can be discharged on a basal/bolus regimen when medically ready- dose TBD  - Patient can f/u with Endocrinologist Dr. Mims    Problem 2: HTN  - Currently well controlled (Goal <130/80)  - Management per primary team    Problem 3: HLD  - Continue statin    Kalee Robert M.D  Endocrine Fellow  Pager #[phone]

## 2021-08-13 ENCOUNTER — TRANSCRIPTION ENCOUNTER (OUTPATIENT)
Age: 61
End: 2021-08-13

## 2021-08-13 VITALS
DIASTOLIC BLOOD PRESSURE: 65 MMHG | HEART RATE: 67 BPM | SYSTOLIC BLOOD PRESSURE: 115 MMHG | OXYGEN SATURATION: 97 % | RESPIRATION RATE: 17 BRPM | TEMPERATURE: 98 F

## 2021-08-13 LAB
ANION GAP SERPL CALC-SCNC: 16 MMOL/L — HIGH (ref 7–14)
ANISOCYTOSIS BLD QL: SLIGHT — SIGNIFICANT CHANGE UP
BASOPHILS # BLD AUTO: 0 K/UL — SIGNIFICANT CHANGE UP (ref 0–0.2)
BASOPHILS NFR BLD AUTO: 0 % — SIGNIFICANT CHANGE UP (ref 0–2)
BUN SERPL-MCNC: 18 MG/DL — SIGNIFICANT CHANGE UP (ref 7–23)
CALCIUM SERPL-MCNC: 9.2 MG/DL — SIGNIFICANT CHANGE UP (ref 8.4–10.5)
CHLORIDE SERPL-SCNC: 102 MMOL/L — SIGNIFICANT CHANGE UP (ref 98–107)
CO2 SERPL-SCNC: 24 MMOL/L — SIGNIFICANT CHANGE UP (ref 22–31)
CREAT SERPL-MCNC: 0.88 MG/DL — SIGNIFICANT CHANGE UP (ref 0.5–1.3)
EOSINOPHIL # BLD AUTO: 0.12 K/UL — SIGNIFICANT CHANGE UP (ref 0–0.5)
EOSINOPHIL NFR BLD AUTO: 0.9 % — SIGNIFICANT CHANGE UP (ref 0–6)
GIANT PLATELETS BLD QL SMEAR: PRESENT — SIGNIFICANT CHANGE UP
GLUCOSE SERPL-MCNC: 61 MG/DL — LOW (ref 70–99)
HCT VFR BLD CALC: 43.2 % — SIGNIFICANT CHANGE UP (ref 39–50)
HGB BLD-MCNC: 14.7 G/DL — SIGNIFICANT CHANGE UP (ref 13–17)
IANC: 4.69 K/UL — SIGNIFICANT CHANGE UP (ref 1.5–8.5)
LYMPHOCYTES # BLD AUTO: 53.9 % — HIGH (ref 13–44)
LYMPHOCYTES # BLD AUTO: 6.89 K/UL — HIGH (ref 1–3.3)
MACROCYTES BLD QL: SLIGHT — SIGNIFICANT CHANGE UP
MAGNESIUM SERPL-MCNC: 2 MG/DL — SIGNIFICANT CHANGE UP (ref 1.6–2.6)
MCHC RBC-ENTMCNC: 31.1 PG — SIGNIFICANT CHANGE UP (ref 27–34)
MCHC RBC-ENTMCNC: 34 GM/DL — SIGNIFICANT CHANGE UP (ref 32–36)
MCV RBC AUTO: 91.3 FL — SIGNIFICANT CHANGE UP (ref 80–100)
MONOCYTES # BLD AUTO: 0.88 K/UL — SIGNIFICANT CHANGE UP (ref 0–0.9)
MONOCYTES NFR BLD AUTO: 6.9 % — SIGNIFICANT CHANGE UP (ref 2–14)
NEUTROPHILS # BLD AUTO: 3.34 K/UL — SIGNIFICANT CHANGE UP (ref 1.8–7.4)
NEUTROPHILS NFR BLD AUTO: 26.1 % — LOW (ref 43–77)
PHOSPHATE SERPL-MCNC: 4.3 MG/DL — SIGNIFICANT CHANGE UP (ref 2.5–4.5)
PLAT MORPH BLD: ABNORMAL
PLATELET # BLD AUTO: 263 K/UL — SIGNIFICANT CHANGE UP (ref 150–400)
PLATELET COUNT - ESTIMATE: NORMAL — SIGNIFICANT CHANGE UP
POLYCHROMASIA BLD QL SMEAR: SLIGHT — SIGNIFICANT CHANGE UP
POTASSIUM SERPL-MCNC: 3.3 MMOL/L — LOW (ref 3.5–5.3)
POTASSIUM SERPL-SCNC: 3.3 MMOL/L — LOW (ref 3.5–5.3)
RBC # BLD: 4.73 M/UL — SIGNIFICANT CHANGE UP (ref 4.2–5.8)
RBC # FLD: 13 % — SIGNIFICANT CHANGE UP (ref 10.3–14.5)
RBC BLD AUTO: ABNORMAL
SODIUM SERPL-SCNC: 142 MMOL/L — SIGNIFICANT CHANGE UP (ref 135–145)
VARIANT LYMPHS # BLD: 12.2 % — HIGH (ref 0–6)
WBC # BLD: 12.79 K/UL — HIGH (ref 3.8–10.5)
WBC # FLD AUTO: 12.79 K/UL — HIGH (ref 3.8–10.5)

## 2021-08-13 PROCEDURE — 99232 SBSQ HOSP IP/OBS MODERATE 35: CPT | Mod: GC

## 2021-08-13 RX ORDER — NICOTINE POLACRILEX 2 MG
1 GUM BUCCAL
Qty: 7 | Refills: 0
Start: 2021-08-13 | End: 2021-08-19

## 2021-08-13 RX ORDER — FUROSEMIDE 40 MG
1 TABLET ORAL
Qty: 60 | Refills: 0
Start: 2021-08-13 | End: 2021-09-11

## 2021-08-13 RX ORDER — FUROSEMIDE 40 MG
20 TABLET ORAL
Refills: 0 | Status: DISCONTINUED | OUTPATIENT
Start: 2021-08-13 | End: 2021-08-13

## 2021-08-13 RX ORDER — POTASSIUM CHLORIDE 20 MEQ
40 PACKET (EA) ORAL ONCE
Refills: 0 | Status: COMPLETED | OUTPATIENT
Start: 2021-08-13 | End: 2021-08-13

## 2021-08-13 RX ADMIN — Medication 8 UNIT(S): at 11:56

## 2021-08-13 RX ADMIN — HEPARIN SODIUM 5000 UNIT(S): 5000 INJECTION INTRAVENOUS; SUBCUTANEOUS at 05:26

## 2021-08-13 RX ADMIN — Medication 1 PATCH: at 11:58

## 2021-08-13 RX ADMIN — Medication 40 MILLIEQUIVALENT(S): at 11:54

## 2021-08-13 RX ADMIN — CLOPIDOGREL BISULFATE 75 MILLIGRAM(S): 75 TABLET, FILM COATED ORAL at 11:57

## 2021-08-13 RX ADMIN — SODIUM CHLORIDE 3 MILLILITER(S): 9 INJECTION INTRAMUSCULAR; INTRAVENOUS; SUBCUTANEOUS at 12:50

## 2021-08-13 RX ADMIN — Medication 81 MILLIGRAM(S): at 11:57

## 2021-08-13 RX ADMIN — ISOSORBIDE MONONITRATE 60 MILLIGRAM(S): 60 TABLET, EXTENDED RELEASE ORAL at 11:57

## 2021-08-13 RX ADMIN — Medication 8 UNIT(S): at 08:01

## 2021-08-13 RX ADMIN — SODIUM CHLORIDE 3 MILLILITER(S): 9 INJECTION INTRAMUSCULAR; INTRAVENOUS; SUBCUTANEOUS at 05:27

## 2021-08-13 RX ADMIN — AMLODIPINE BESYLATE 10 MILLIGRAM(S): 2.5 TABLET ORAL at 05:26

## 2021-08-13 RX ADMIN — Medication 20 MILLIGRAM(S): at 11:56

## 2021-08-13 RX ADMIN — Medication 100 MILLIGRAM(S): at 05:26

## 2021-08-13 RX ADMIN — BUDESONIDE AND FORMOTEROL FUMARATE DIHYDRATE 2 PUFF(S): 160; 4.5 AEROSOL RESPIRATORY (INHALATION) at 08:02

## 2021-08-13 RX ADMIN — Medication 1 PATCH: at 12:49

## 2021-08-13 RX ADMIN — LISINOPRIL 40 MILLIGRAM(S): 2.5 TABLET ORAL at 05:26

## 2021-08-13 RX ADMIN — Medication 1 PATCH: at 09:20

## 2021-08-13 RX ADMIN — PANTOPRAZOLE SODIUM 40 MILLIGRAM(S): 20 TABLET, DELAYED RELEASE ORAL at 05:26

## 2021-08-13 NOTE — DISCHARGE NOTE NURSING/CASE MANAGEMENT/SOCIAL WORK - NSDCPEWEB_GEN_ALL_CORE
[facility] for Tobacco Control website --- [email or web address] website --- [email or web address]

## 2021-08-13 NOTE — DISCHARGE NOTE NURSING/CASE MANAGEMENT/SOCIAL WORK - PATIENT PORTAL LINK FT
You can access the FollowMyHealth Patient Portal offered by [facility] by registering at the following website: [email or web address]. By joining [facility]’s FollowMyHealth portal, you will also be able to view your health information using other applications (apps) compatible with our system.

## 2021-08-13 NOTE — DISCHARGE NOTE PROVIDER - NSDCCPCAREPLAN_GEN_ALL_CORE_FT
PRINCIPAL DISCHARGE DIAGNOSIS  Diagnosis: NSTEMI (non-ST elevation myocardial infarction)  Assessment and Plan of Treatment: You were transferred to [facility] for cardiac catheterization. Echocardiogram showed moderate left ventricular dysfunction. You had a left heart catheterization which showed a blockage in one of the arteries of your heart, but your heart was still getting sufficient blood supply from another heart vessel. As per cardiology, you are to be treated medically with aspirin, plavix and atorvastatin. You were continued on IV lasix and transitioned to lasix by mouth. Please follow up with your primary care physician and cardiologist after discharge for continued monitoring and management.      SECONDARY DISCHARGE DIAGNOSES  Diagnosis: Positive blood cultures  Assessment and Plan of Treatment: You were found with positive blood cultures from 8/9, most likely a contaminant. Infectious disease was consulted. You completed course of IV antibiotics and repeat blood cultures were negative. Please follow up with your primary care physician after discharge for continued monitoring and management.

## 2021-08-13 NOTE — DISCHARGE NOTE PROVIDER - NSDCMRMEDTOKEN_GEN_ALL_CORE_FT
amLODIPine 10 mg oral tablet: 1 tab(s) orally once a day  Aspir 81 oral delayed release tablet: 1 tab(s) orally once a day  clopidogrel 75 mg oral tablet: 1 tab(s) orally once a day  furosemide 20 mg oral tablet: 1 tab(s) orally 2 times a day  HumaLOG 100 units/mL subcutaneous solution: 5 unit(s) subcutaneous 3 times a day (with meals) and sliding scale  isosorbide mononitrate 60 mg oral tablet, extended release: 1 tab(s) orally once a day (in the morning)  lisinopril 40 mg oral tablet: 1 tab(s) orally once a day  metoprolol succinate 100 mg oral tablet, extended release: 1 tab(s) orally once a day  pravastatin 40 mg oral tablet: 1 tab(s) orally once a day  Toujeo SoloStar 300 units/mL subcutaneous solution: 18 unit(s) subcutaneous once a day (at bedtime)  Vitamin C 1000 mg oral tablet: 1 tab(s) orally once a day  Vitamin D2 1.25 mg (50,000 intl units) oral capsule: 1 cap(s) orally once a week   amLODIPine 10 mg oral tablet: 1 tab(s) orally once a day  Aspir 81 oral delayed release tablet: 1 tab(s) orally once a day  clopidogrel 75 mg oral tablet: 1 tab(s) orally once a day  furosemide 20 mg oral tablet: 1 tab(s) orally 2 times a day  HumaLOG 100 units/mL subcutaneous solution: 5 unit(s) subcutaneous 3 times a day (with meals) and sliding scale  isosorbide mononitrate 60 mg oral tablet, extended release: 1 tab(s) orally once a day (in the morning)  lisinopril 40 mg oral tablet: 1 tab(s) orally once a day  metoprolol succinate 100 mg oral tablet, extended release: 1 tab(s) orally once a day  nicotine 14 mg/24 hr transdermal film, extended release: 1 patch transdermal once a day   pravastatin 40 mg oral tablet: 1 tab(s) orally once a day  Toujeo SoloStar 300 units/mL subcutaneous solution: 18 unit(s) subcutaneous once a day (at bedtime)  Vitamin C 1000 mg oral tablet: 1 tab(s) orally once a day  Vitamin D2 1.25 mg (50,000 intl units) oral capsule: 1 cap(s) orally once a week

## 2021-08-13 NOTE — DISCHARGE NOTE PROVIDER - CARE PROVIDER_API CALL
Nicolas Price  CARDIOVASCULAR DISEASE  [address] [place] [address]  Phone: [phone]  Fax: [phone]  Follow Up Time:

## 2021-08-13 NOTE — PROGRESS NOTE ADULT - ATTENDING COMMENTS
pt seen and examined agree with plan above
Type 1 DM complicated by steroid induced severe hyperglycemia.  Now off steroid, glucose improvement noted and even with hypoglycemia this AM.  Agree with basal bolus insulin plan as outlined. Temporarily to use lower doses given hypoglycemia, instructions provided.  Has outpatient endocrine follow up scheduled.    Joe Lester MD  Division of Endocrinology  Pager: [phone]    If after 6PM or before 9AM, or on weekends/holidays, please call endocrine answering service for assistance ([phone]).  For nonurgent matters email [email or web address] for assistance.

## 2021-08-13 NOTE — PROGRESS NOTE ADULT - PROBLEM SELECTOR PLAN 1
s/p cath: doing ok : DP is high : on diuretics    8/13:  seems to be doing ok : no SOB : no cough
s/p cath: doing ok : DP is high : on diuretics

## 2021-08-13 NOTE — DISCHARGE NOTE NURSING/CASE MANAGEMENT/SOCIAL WORK - NSDCPEEMAIL_GEN_ALL_CORE
[facility] for Tobacco Control email [email or web address]

## 2021-08-13 NOTE — PROGRESS NOTE ADULT - ASSESSMENT
62 y/o Male with medical hx significant for HTN, HLD, DM, CAD with triple vessel bypass, PAD s/o Right LE stent, and Active smoker presenting to the ED due to shortness of breath    1. NSTEMI  -echo with moderate segmental LV dysfunction  -s/p LHC with NSTEMI sec to SVG to D1 closing 100%, getting supply from [facility] will treat medically  -c/w asa, plavix and atorvastatin  -LVEDP 38 c.w IV lasix, monitor I&Os    2. HTN  -controlled  -c/w imdur, lisinopril, metoprolol and norvasc  -continue to monitor BP    3. Bacteremia  -BCx from 8/9 possible contaminant  -repeat cultures NGTD  -on IV abx  -f/u ID    4. DVT prophylaxis  -hep subq  
60 y/o M w/ PMH of HTN, HLD, DM, CAD s/p CABG + stents, PAD s/p RLE stent + LLE POBA and Active smoker presented to [facility] ED for complaint of chest tightness and shortness of breath. Initially Patient attributed it to his hx of Anxiety. He and his wife attended a gathering with about 30 people in close proximity , both are vaccinated. No hx of sick contacts, fevers, leg swelling, palpitations.     In the ER, the Cxray shows evidence of increased markings in RLL and CT Angio shows moderate emphysema + pulm HTN and interstitial edema. Pt started on IV Ceftriaxone + Azithromycin and Blood cultures showed Gram + cocci in clusters. Pt was seen by ID, and repeat blood cultures sent.     Pt found to have elevated Troponins which are now downtrending. He is s/p Full dose Lovenox and then heparin drip. Pt also is being treated for a COPD exacerbation with duonebs + steroids. Of note patient has an IV contrast allergy and was pre-medicated with 3 doses of PO prednisone and IV Benadryl. Pt now transferred to [facility] for cardiac catheterization. (11 Aug 2021 08:36)    s/p cardiac cath on 8/11.  ID consulted for further abx managment.  Pt denies recent fever/chills/worsening cough from his baseline/abd pain/diarrhea/dysuria/rash/joint pain.      CoNS bacteremia:    - This is most likely contaminant.  Repeat bcx from 8/11 ngtd  - No indication for  vancomycin.       SOB:    - h/o active smoking, a/w acute hypoxic resp failure due to pulmonary edema in the setting of NSTEMI  - CT chest reviewed - changes s/o emphysema, pulm HTN and edema.  No consolidations reported to suggest pna.  Pt states cough at baseline.  On empiric abx, day #5.  Recommend d/c abx on 8/12  - Monitor wbc and temp curve.  - f/u repeat bcx - ngtd    NSTEMI:    - transferred to [facility] for NSTEMI, s/p cardiac cath      COPD exacerbation:    - Pulm recs appreciated, cont home meds  - Duonebs  - Smoking cessation    *d/c abx and observe off.  Repeat bcx neg, s/p empiric course of abx, low suspicion for pna.     Will follow,    Kareen Mukherjee  [phone]
62 y/o M w/ PMH of HTN, HLD, DM1, CAD s/p CABG + stents, PAD s/p stents, who presented to [facility] for chest pain. Patient initially treated for COPD exacerbation with steroids. However he was noted to have elevated troponin and was transferred to [facility] for cardiac catheterization which he underwent 8/11/21. Patient noted to have hyperglycemia 2/2 to receiving steroids. Endocrine consulted for further management.     Problem 1: DM1 c/b steroid induced hyperglycemia  Home Regimen: Toujeo 18-21 units qhs and Humalog 8 units AC meals.  On 8/11 patient received hydrocortisone 100mg IV.  In addition, he received 3 doses of prednisone 50 for pretreatment of contrast allergy-- Last dose received on 8/11 at 7:00am  No further steroids ordered at this time.  - Decrease Lantus to 16 units qhs  - Decrease Admelog to 7 units AC meals (Hold if patient is NPO)  - Low dose correctional scale before meals and low dose qhs  - Monitor FS before meals and qhs (Inpatient Goal 100-180 mg/dl)  - Consistent Carb Diet    Discharge Plan:   - Patient can be discharged on a basal/bolus regimen when medically ready- Toujeo 16 units qhs and Humalog 7 units AC meals.  - Patient can f/u with Endocrinologist Dr. Mims    Problem 2: HTN  - Currently well controlled (Goal <130/80)  - Management per primary team    Problem 3: HLD  - Continue statin    Kalee Robert M.D  Endocrine Fellow  Pager #[phone]  
60 y/o Male with medical hx significant for HTN, HLD, DM, CAD with triple vessel bypass, and active smoker presenting to the ED due to shortness of breath since early this morning, admitted for Acute Hypoxic Resp Failure 2/2 Pulm edema vs underlying Pneumonia
62 y/o Male with medical hx significant for HTN, HLD, DM, CAD with triple vessel bypass, and active smoker presenting to the ED due to shortness of breath since early this morning, admitted for Acute Hypoxic Resp Failure 2/2 Pulm edema vs underlying Pneumonia

## 2021-08-13 NOTE — PROGRESS NOTE ADULT - PROBLEM SELECTOR PROBLEM 1
Type 1 diabetes mellitus with other manifestations
NSTEMI (non-ST elevation myocardial infarction)
NSTEMI (non-ST elevation myocardial infarction)

## 2021-08-13 NOTE — PROGRESS NOTE ADULT - PROBLEM SELECTOR PLAN 2
seems to be doing ok: no exacerbation:
seems to be doing ok: no exacerbation:  8/13: seems to be doing ok : no SOB :  on rooma ir;

## 2021-08-13 NOTE — DISCHARGE NOTE PROVIDER - HOSPITAL COURSE
60 y/o M w/ PMH of HTN, HLD, DM, CAD s/p CABG + stents, PAD s/p RLE stent + LLE POBA and Active smoker presented to [facility] ED for complaint of chest tightness and shortness of breath. Initially Patient attributed it to his hx of Anxiety. He and his wife attended a gathering with about 30 people in close proximity , both are vaccinated. No hx of sick contacts, fevers, leg swelling, palpitations. In the ER, the Cxray shows evidence of increased markings in RLL and CT Angio shows moderate emphysema + pulm HTN. Pt started on IV Ceftriaxone + Azithromycin and Blood cultures showed Gram + cocci in clusters. ID is following and repeat blood culture is pending. Pt found to have elevated Troponins which are now downtrending. He is s/p Full dose Lovenox and then heparin drip. Pt also is being treated for a COPD exacerbation with duonebs + steroids. Of note patient has an IV contrast allergy and was pre-medicated with 3 doses of PO prednisone and IV Benadryl. Pt now transferred to [facility] for cardiac catheretization.    Patient was transferred to [facility] for cardiac catheterization. TTE showed moderate segmental LV dysfunction. Patient had LHC with NSTEMI secondary to SVG to D1 closing 100%, getting supply from [facility]. Patient to be treated medically with ASA, plavix and atorvastatin. Patient was continued on IV lasix and transitioned to PO lasix. Patient was found with positive blood cultures from 8/9, most likely a contaminant. Infectious disease was consulted. Patient completed course of IV antibiotics and repeat blood cultures were negative.     On 8/13/2021 this case was reviewed with Dr. Price, the patient is medically stable and optimized for discharge. All medications were reviewed and prescriptions were sent to mutually agreed upon pharmacy.

## 2021-08-14 LAB
CULTURE RESULTS: SIGNIFICANT CHANGE UP
SPECIMEN SOURCE: SIGNIFICANT CHANGE UP

## 2021-08-16 LAB
CULTURE RESULTS: SIGNIFICANT CHANGE UP
SPECIMEN SOURCE: SIGNIFICANT CHANGE UP

## 2021-12-22 PROBLEM — I10 ESSENTIAL (PRIMARY) HYPERTENSION: Chronic | Status: ACTIVE | Noted: 2021-08-08

## 2022-01-24 NOTE — PROGRESS NOTE ADULT - SUBJECTIVE AND OBJECTIVE BOX
Date of Service: 08-13-21 @ 13:06    Patient is a 61y old  Male who presents with a chief complaint of Chest Pain (12 Aug 2021 17:17)      Any change in ROS: Doing ok : no SOB :   'no cough :       MEDICATIONS  (STANDING):  amLODIPine   Tablet 10 milliGRAM(s) Oral daily  aspirin enteric coated 81 milliGRAM(s) Oral daily  atorvastatin 40 milliGRAM(s) Oral at bedtime  budesonide  80 MICROgram(s)/formoterol 4.5 MICROgram(s) Inhaler 2 Puff(s) Inhalation two times a day  clopidogrel Tablet 75 milliGRAM(s) Oral daily  dextrose 40% Gel 15 Gram(s) Oral once  dextrose 5%. 1000 milliLiter(s) (50 mL/Hr) IV Continuous <Continuous>  dextrose 5%. 1000 milliLiter(s) (100 mL/Hr) IV Continuous <Continuous>  dextrose 50% Injectable 25 Gram(s) IV Push once  dextrose 50% Injectable 12.5 Gram(s) IV Push once  dextrose 50% Injectable 25 Gram(s) IV Push once  furosemide   Injectable 20 milliGRAM(s) IV Push every 12 hours  glucagon  Injectable 1 milliGRAM(s) IntraMuscular once  heparin   Injectable 5000 Unit(s) SubCutaneous every 12 hours  insulin glargine Injectable (LANTUS) 18 Unit(s) SubCutaneous at bedtime  insulin lispro (ADMELOG) corrective regimen sliding scale   SubCutaneous three times a day before meals  insulin lispro (ADMELOG) corrective regimen sliding scale   SubCutaneous at bedtime  insulin lispro Injectable (ADMELOG) 8 Unit(s) SubCutaneous three times a day before meals  isosorbide   mononitrate ER Tablet (IMDUR) 60 milliGRAM(s) Oral daily  lisinopril 40 milliGRAM(s) Oral daily  metoprolol succinate  milliGRAM(s) Oral daily  nicotine -  14 mG/24Hr(s) Patch 1 Patch Transdermal daily  pantoprazole    Tablet 40 milliGRAM(s) Oral before breakfast  sodium chloride 0.9% lock flush 3 milliLiter(s) IV Push every 8 hours    MEDICATIONS  (PRN):  albuterol/ipratropium for Nebulization 3 milliLiter(s) Nebulizer every 6 hours PRN Shortness of Breath and/or Wheezing  nicotine  Polacrilex Gum 2 milliGRAM(s) Oral every 2 hours PRN breakthrough cravings    Vital Signs Last 24 Hrs  T(C): 36.9 (13 Aug 2021 12:30), Max: 36.9 (13 Aug 2021 05:24)  T(F): 98.4 (13 Aug 2021 12:30), Max: 98.5 (13 Aug 2021 05:24)  HR: 67 (13 Aug 2021 12:30) (65 - 70)  BP: 115/65 (13 Aug 2021 12:30) (108/60 - 130/69)  BP(mean): --  RR: 17 (13 Aug 2021 12:30) (17 - 18)  SpO2: 97% (13 Aug 2021 12:30) (95% - 97%)    I&O's Summary    12 Aug 2021 07:01  -  13 Aug 2021 07:00  --------------------------------------------------------  IN: 690 mL / OUT: 840 mL / NET: -150 mL          Physical Exam:   GENERAL: NAD, well-groomed, well-developed  HEENT: DAVE/   Atraumatic, Normocephalic  ENMT: No tonsillar erythema, exudates, or enlargement; Moist mucous membranes, Good dentition, No lesions  NECK: Supple, No JVD, Normal thyroid  CHEST/LUNG: Clear to auscultaion+  CVS: Regular rate and rhythm; No murmurs, rubs, or gallops  GI: : Soft, Nontender, Nondistended; Bowel sounds present  NERVOUS SYSTEM:  Alert & Oriented X3  EXTREMITIES:  2+ Peripheral Pulses, No clubbing, cyanosis, or edema  LYMPH: No lymphadenopathy noted  SKIN: No rashes or lesions  ENDOCRINOLOGY: No Thyromegaly  PSYCH: Appropriate    Labs:  25                            14.7   12.79 )-----------( 263      ( 13 Aug 2021 07:16 )             43.2                         12.5   12.96 )-----------( 229      ( 12 Aug 2021 07:23 )             36.6                         13.7   11.35 )-----------( 238      ( 10 Aug 2021 07:21 )             41.1                         13.2   8.55  )-----------( 209      ( 10 Aug 2021 00:15 )             38.3     08-13    142  |  102  |  18  ----------------------------<  61<L>  3.3<L>   |  24  |  0.88  08-12    140  |  102  |  24<H>  ----------------------------<  179<H>  3.7   |  22  |  0.88  08-11    136  |  100  |  25<H>  ----------------------------<  429<H>  3.8   |  20<L>  |  1.00    Ca    9.2      13 Aug 2021 07:16  Ca    8.9      12 Aug 2021 07:23  Ca    9.2      11 Aug 2021 19:01  Phos  4.3     08-13  Phos  4.9     08-12  Mg     2.00     08-13  Mg     2.20     08-12      CAPILLARY BLOOD GLUCOSE      POCT Blood Glucose.: 102 mg/dL (13 Aug 2021 11:48)  POCT Blood Glucose.: 70 mg/dL (13 Aug 2021 07:40)  POCT Blood Glucose.: 132 mg/dL (12 Aug 2021 21:03)  POCT Blood Glucose.: 122 mg/dL (12 Aug 2021 16:03)            D-Dimer Assay, Quantitative: 288 ng/mL DDU (08-08 @ 07:21)        RECENT CULTURES:  08-11 @ 03:29 .Blood Blood         ra< from: CT Angio Chest PE Protocol w/ IV Cont (08.08.21 @ 13:23) >        INTERPRETATION:  CLINICAL INFORMATION: 61-year-old man with shortness of breath and substernal chest pain. History of CABG and smoking.    COMPARISON: None.    CONTRAST/COMPLICATIONS:  IV Contrast: Omnipaque 350  50 cc administered   50 cc discarded  Oral Contrast: NONE  Complications: None reported at time of study completion    PROCEDURE:  CT Angiography of the Chest.  Sagittal and coronal reformats were performed as well as 3D (MIP) reconstructions.    FINDINGS:    LUNGS AND AIRWAYS: Patent central airways.  Moderately severe emphysema. Moderately extensive right lower lobe atelectasis. Subsegmental left lower lobe atelectasis. Interlobular septal thickening which may be secondary to pulmonary interstitial edema  PLEURA: Small bilateral pleural effusions.  MEDIASTINUM AND TREY: AP window lymph node 19 x 11 mm. Several additional small lymph nodes.  VESSELS: No pulmonary embolus. Main pulmonary artery dilated to 3.6 cm suggesting pulmonary arterial hypertension.  HEART: Heart size is borderline. No pericardial effusion. Coronary artery calcification.  CHEST WALL AND LOWER NECK: Within normal limits.  VISUALIZED UPPER ABDOMEN: 12 mmirregular hypodensity posterior liver dome.  BONES: Within normal limits.    IMPRESSION:  No pulmonary embolus.  Moderately severe emphysema.  Enlarged main pulmonary artery suggesting pulmonary arterial hypertension.  Appearance suggesting pulmonaryinterstitial edema        --- End of Report ---            VANIA ARAYA MD; Attending Radiologist  This document has been electronically signed. Aug  8 2021  2:02PM    < end of copied text >         No growth to date.    08-09 @ 06:35 .Blood Blood-Peripheral   PCR    Growth in aerobic bottle: Gram Positive Cocci in Clusters    Blood Culture PCR  Blood Culture PCR     Growth in aerobic bottle: Staphylococcus haemolyticus  Coag Negative Staphylococcus  Single set isolate, possible contaminant. Contact  Microbiology if susceptibility testing clinically  indicated.  ***Blood Panel PCR results on this specimen are available  approximately 3 hours after the Gram stain result.***  Gram stain, PCR, and/or culture results may not always  correspond due to difference in methodologies.  ************************************************************  This PCR assay was performed by multiplex PCR. This  Assay tests for 66 bacterial and resistance gene targets.  Please refer to the [facility] [employer] [facility] test directory  at [email or web address] for details.          RESPIRATORY CULTURES:          Studies  Chest X-RAY  CT SCAN Chest   Venous Dopplers: LE:   CT Abdomen  Others              
Upon review of the In Basket request we Recv'd report with no DOS -  called this am for another copy with the DOS  closing this until it is recv'd then will scan into chart  Any additional questions or concerns should be emailed to the Practice Liaisons via [email or web address] email, please do not reply via In Basket      Thank you  Sherryle Breen, MA
Date of Service: 08-12-21 @ 14:32    Patient is a 61y old  Male who presents with a chief complaint of Transferred for cardiac cath (11 Aug 2021 11:53)      Any change in ROS: seems to be doing ok : no Sob: wife at bedside    MEDICATIONS  (STANDING):  albuterol/ipratropium for Nebulization 3 milliLiter(s) Nebulizer every 6 hours  amLODIPine   Tablet 10 milliGRAM(s) Oral daily  aspirin enteric coated 81 milliGRAM(s) Oral daily  atorvastatin 40 milliGRAM(s) Oral at bedtime  azithromycin  IVPB 500 milliGRAM(s) IV Intermittent every 24 hours  budesonide  80 MICROgram(s)/formoterol 4.5 MICROgram(s) Inhaler 2 Puff(s) Inhalation two times a day  clopidogrel Tablet 75 milliGRAM(s) Oral daily  dextrose 40% Gel 15 Gram(s) Oral once  dextrose 5%. 1000 milliLiter(s) (50 mL/Hr) IV Continuous <Continuous>  dextrose 5%. 1000 milliLiter(s) (100 mL/Hr) IV Continuous <Continuous>  dextrose 50% Injectable 25 Gram(s) IV Push once  dextrose 50% Injectable 12.5 Gram(s) IV Push once  dextrose 50% Injectable 25 Gram(s) IV Push once  furosemide   Injectable 20 milliGRAM(s) IV Push every 12 hours  glucagon  Injectable 1 milliGRAM(s) IntraMuscular once  heparin   Injectable 5000 Unit(s) SubCutaneous every 12 hours  insulin glargine Injectable (LANTUS) 20 Unit(s) SubCutaneous at bedtime  insulin lispro (ADMELOG) corrective regimen sliding scale   SubCutaneous three times a day before meals  insulin lispro (ADMELOG) corrective regimen sliding scale   SubCutaneous at bedtime  insulin lispro Injectable (ADMELOG) 10 Unit(s) SubCutaneous three times a day before meals  isosorbide   mononitrate ER Tablet (IMDUR) 60 milliGRAM(s) Oral daily  lisinopril 40 milliGRAM(s) Oral daily  metoprolol succinate  milliGRAM(s) Oral daily  nicotine -  14 mG/24Hr(s) Patch 1 Patch Transdermal daily  pantoprazole    Tablet 40 milliGRAM(s) Oral before breakfast  sodium chloride 0.9% lock flush 3 milliLiter(s) IV Push every 8 hours    MEDICATIONS  (PRN):  nicotine  Polacrilex Gum 2 milliGRAM(s) Oral every 2 hours PRN breakthrough cravings    Vital Signs Last 24 Hrs  T(C): 36.7 (12 Aug 2021 11:32), Max: 36.9 (12 Aug 2021 05:51)  T(F): 98 (12 Aug 2021 11:32), Max: 98.4 (12 Aug 2021 05:51)  HR: 75 (12 Aug 2021 11:32) (63 - 80)  BP: 139/70 (12 Aug 2021 11:32) (115/58 - 139/70)  BP(mean): --  RR: 16 (12 Aug 2021 11:32) (16 - 16)  SpO2: 95% (12 Aug 2021 11:32) (93% - 100%)    I&O's Summary    11 Aug 2021 07:01  -  12 Aug 2021 07:00  --------------------------------------------------------  IN: 150 mL / OUT: 300 mL / NET: -150 mL    12 Aug 2021 07:01  -  12 Aug 2021 14:32  --------------------------------------------------------  IN: 690 mL / OUT: 840 mL / NET: -150 mL          Physical Exam:   GENERAL: NAD, well-groomed, well-developed  HEENT: DAVE/   Atraumatic, Normocephalic  ENMT: No tonsillar erythema, exudates, or enlargement; Moist mucous membranes, Good dentition, No lesions  NECK: Supple, No JVD, Normal thyroid  CHEST/LUNG: Clear to auscultaion  CVS: Regular rate and rhythm; No murmurs, rubs, or gallops  GI: : Soft, Nontender, Nondistended; Bowel sounds present  NERVOUS SYSTEM:  Alert & Oriented X3,  EXTREMITIES:  2+ Peripheral Pulses, No clubbing, cyanosis, or edema  LYMPH: No lymphadenopathy noted  SKIN: No rashes or lesions  ENDOCRINOLOGY: No Thyromegaly  PSYCH: Appropriate    Labs:  25                            12.5   12.96 )-----------( 229      ( 12 Aug 2021 07:23 )             36.6                         13.7   11.35 )-----------( 238      ( 10 Aug 2021 07:21 )             41.1                         13.2   8.55  )-----------( 209      ( 10 Aug 2021 00:15 )             38.3                         14.7   15.53 )-----------( 259      ( 09 Aug 2021 08:47 )             44.3     08-12    140  |  102  |  24<H>  ----------------------------<  179<H>  3.7   |  22  |  0.88  08-11    136  |  100  |  25<H>  ----------------------------<  429<H>  3.8   |  20<L>  |  1.00  08-09    139  |  104  |  26<H>  ----------------------------<  294<H>  4.9   |  25  |  1.15    Ca    8.9      12 Aug 2021 07:23  Ca    9.2      11 Aug 2021 19:01  Phos  4.9     08-12  Mg     2.20     08-12      CAPILLARY BLOOD GLUCOSE      POCT Blood Glucose.: 140 mg/dL (12 Aug 2021 11:26)  POCT Blood Glucose.: 184 mg/dL (12 Aug 2021 07:54)  POCT Blood Glucose.: 216 mg/dL (11 Aug 2021 21:20)  POCT Blood Glucose.: 421 mg/dL (11 Aug 2021 18:09)  POCT Blood Glucose.: 469 mg/dL (11 Aug 2021 18:07)  POCT Blood Glucose.: 426 mg/dL (11 Aug 2021 16:43)  POCT Blood Glucose.: 402 mg/dL (11 Aug 2021 16:42)        PTT - ( 11 Aug 2021 03:08 )  PTT:93.6 sec    D-Dimer Assay, Quantitative: 288 ng/mL DDU (08-08 @ 07:21)  Procalcitonin, Serum: 0.11 ng/mL (08-09 @ 03:44)        RECENT CULTURES:  08-11 @ 03:29 .Blood Blood       r< from: CT Angio Chest PE Protocol w/ IV Cont (08.08.21 @ 13:23) >  IV Contrast: Omnipaque 350  50 cc administered   50 cc discarded  Oral Contrast: NONE  Complications: None reported at time of study completion    PROCEDURE:  CT Angiography of the Chest.  Sagittal and coronal reformats were performed as well as 3D (MIP) reconstructions.    FINDINGS:    LUNGS AND AIRWAYS: Patent central airways.  Moderately severe emphysema. Moderately extensive right lower lobe atelectasis. Subsegmental left lower lobe atelectasis. Interlobular septal thickening which may be secondary to pulmonary interstitial edema  PLEURA: Small bilateral pleural effusions.  MEDIASTINUM AND TREY: AP window lymph node 19 x 11 mm. Several additional small lymph nodes.  VESSELS: No pulmonary embolus. Main pulmonary artery dilated to 3.6 cm suggesting pulmonary arterial hypertension.  HEART: Heart size is borderline. No pericardial effusion. Coronary artery calcification.  CHEST WALL AND LOWER NECK: Within normal limits.  VISUALIZED UPPER ABDOMEN: 12 mmirregular hypodensity posterior liver dome.  BONES: Within normal limits.    IMPRESSION:  No pulmonary embolus.  Moderately severe emphysema.  Enlarged main pulmonary artery suggesting pulmonary arterial hypertension.  Appearance suggesting pulmonaryinterstitial edema        --- End of Report ---            VANIA ARAYA MD; Attending Radiologist  This document has been electronically signed. Aug  8 2021  2:02PM    < end of copied text >       < from: Transthoracic Echocardiogram (08.09.21 @ 07:12) >  ysfunction.The distal anterior wall,apex and  distal septum are hypokinetic EF=40-45%. Normal left  ventricular internal dimensions and wall thicknesses. Grade  II diastolic dysfunction.  Right Heart: Normal right atrium. Normal right ventricular  size with decreased RV systolic function(Tapse 1.6cm,tissue  doppler .08m/s). There is trace tricuspid regurgitation.  There is moderate pulmonic regurgitation.  Pericardium/PleuraNormal pericardium with no pericardial  effusion.  Hemodynamic: RA Pressure is 8 mm Hg. RV systolic pressure  is normal at  33 mm Hg.  ------------------------------------------------------------------------  CONCLUSIONS:  1. Mitral annular calcification. Mild mitral regurgitation.    2. Normal trileaflet aortic valve.  3. Aortic Root: 3.3 cm.  4. Mildly dilated left atrium.  LA volume index = 37 cc/m2.  5. Moderate segmental left ventricular systolic  dysfunction.The distal anterior wall,apex and distal septum  are hypokinetic EF=40-45%.  6. Grade II diastolic dysfunction.  7. Normal right atrium.  8. Normal right ventricular size with decreased RV systolic  function(Tapse 1.6cm,tissue doppler .08m/s).  9. RA Pressure is 8 mm Hg.  10. RV systolic pressure is normal at  33 mm Hg.  11. There is trace tricuspid regurgitation.  12. There is moderate pulmonic regurgitation.  13. Normal pericardium with no pericardial effusion.    < end of copied text >      No growth to date.    08-09 @ 06:35 .Blood Blood-Peripheral   PCR    Growth in aerobic bottle: Gram Positive Cocci in Clusters    Blood Culture PCR  Blood Culture PCR     Growth in aerobic bottle: Staphylococcus haemolyticus  Coag Negative Staphylococcus  Single set isolate, possible contaminant. Contact  Microbiology if susceptibility testing clinically  indicated.  ***Blood Panel PCR results on this specimen are available  approximately 3 hours after the Gram stain result.***  Gram stain, PCR, and/or culture results may not always  correspond due to difference in methodologies.  ************************************************************  This PCR assay was performed by multiplex PCR. This  Assay tests for 66 bacterial and resistance gene targets.  Please refer to the [facility] [employer] [facility] test directory  at [email or web address] for details.          RESPIRATORY CULTURES:          Studies  Chest X-RAY  CT SCAN Chest   Venous Dopplers: LE:   CT Abdomen  Others              
Infectious Diseases progress note:    Subjective: NAD, afebrile.  WBC 12.  Repeat bcx neg.      ROS:  CONSTITUTIONAL:  No fever, chills, rigors  CARDIOVASCULAR:  No chest pain or palpitations  RESPIRATORY:   No SOB, cough, dyspnea on exertion.  No wheezing  GASTROINTESTINAL:  No abd pain, N/V, diarrhea/constipation  EXTREMITIES:  No swelling or joint pain  GENITOURINARY:  No burning on urination, increased frequency or urgency.  No flank pain  NEUROLOGIC:  No HA, visual disturbances  SKIN: No rashes    Allergies    iodinated radiocontrast agents (Rhinitis)  penicillin (Short breath; Rash)  penicillins (Anaphylaxis)    Intolerances        ANTIBIOTICS/RELEVANT:  antimicrobials    immunologic:    OTHER:  albuterol/ipratropium for Nebulization 3 milliLiter(s) Nebulizer every 6 hours PRN  amLODIPine   Tablet 10 milliGRAM(s) Oral daily  aspirin enteric coated 81 milliGRAM(s) Oral daily  atorvastatin 40 milliGRAM(s) Oral at bedtime  budesonide  80 MICROgram(s)/formoterol 4.5 MICROgram(s) Inhaler 2 Puff(s) Inhalation two times a day  clopidogrel Tablet 75 milliGRAM(s) Oral daily  dextrose 40% Gel 15 Gram(s) Oral once  dextrose 5%. 1000 milliLiter(s) IV Continuous <Continuous>  dextrose 5%. 1000 milliLiter(s) IV Continuous <Continuous>  dextrose 50% Injectable 25 Gram(s) IV Push once  dextrose 50% Injectable 12.5 Gram(s) IV Push once  dextrose 50% Injectable 25 Gram(s) IV Push once  furosemide   Injectable 20 milliGRAM(s) IV Push every 12 hours  glucagon  Injectable 1 milliGRAM(s) IntraMuscular once  heparin   Injectable 5000 Unit(s) SubCutaneous every 12 hours  insulin glargine Injectable (LANTUS) 20 Unit(s) SubCutaneous at bedtime  insulin lispro (ADMELOG) corrective regimen sliding scale   SubCutaneous three times a day before meals  insulin lispro (ADMELOG) corrective regimen sliding scale   SubCutaneous at bedtime  insulin lispro Injectable (ADMELOG) 10 Unit(s) SubCutaneous three times a day before meals  isosorbide   mononitrate ER Tablet (IMDUR) 60 milliGRAM(s) Oral daily  lisinopril 40 milliGRAM(s) Oral daily  metoprolol succinate  milliGRAM(s) Oral daily  nicotine  Polacrilex Gum 2 milliGRAM(s) Oral every 2 hours PRN  nicotine -  14 mG/24Hr(s) Patch 1 Patch Transdermal daily  pantoprazole    Tablet 40 milliGRAM(s) Oral before breakfast  sodium chloride 0.9% lock flush 3 milliLiter(s) IV Push every 8 hours      Objective:  Vital Signs Last 24 Hrs  T(C): 36.7 (12 Aug 2021 11:32), Max: 36.9 (12 Aug 2021 05:51)  T(F): 98 (12 Aug 2021 11:32), Max: 98.4 (12 Aug 2021 05:51)  HR: 75 (12 Aug 2021 11:32) (63 - 75)  BP: 139/70 (12 Aug 2021 11:32) (115/58 - 139/70)  BP(mean): --  RR: 16 (12 Aug 2021 11:32) (16 - 16)  SpO2: 95% (12 Aug 2021 11:32) (93% - 100%)    PHYSICAL EXAM:  Constitutional:NAD  Eyes:DAVE, EOMI  Ear/Nose/Throat: no thrush, mucositis.  Moist mucous membranes	  Neck:no JVD, no lymphadenopathy, supple  Respiratory: CTA akanksha  Cardiovascular: S1S2 RRR, no murmurs  Gastrointestinal:soft, nontender,  nondistended (+) BS  Extremities:no e/e/c  Skin:  no rashes, open wounds or ulcerations        LABS:                        12.5   12.96 )-----------( 229      ( 12 Aug 2021 07:23 )             36.6     08-12    140  |  102  |  24<H>  ----------------------------<  179<H>  3.7   |  22  |  0.88    Ca    8.9      12 Aug 2021 07:23  Phos  4.9     08-12  Mg     2.20     08-12      PTT - ( 11 Aug 2021 03:08 )  PTT:93.6 sec      Procalcitonin, Serum: 0.11 (08-09 @ 03:44)                    MICROBIOLOGY:    Culture - Blood (08.11.21 @ 03:29)   Specimen Source: .Blood Blood   Culture Results:   No growth to date.     Culture - Blood (08.09.21 @ 06:35)   Gram Stain:   Growth in anaerobic bottle: Gram Positive Cocci in Clusters   Specimen Source: .Blood Blood-Peripheral   Culture Results:   Growth in anaerobic bottle: Gram Positive Cocci in Clusters       Culture - Blood (08.09.21 @ 06:35)   - Coagulase negative Staphylococcus: Detec   Gram Stain:   Growth in aerobic bottle: Gram Positive Cocci in Clusters   Specimen Source: .Blood Blood-Peripheral   Organism: Blood Culture PCR   Culture Results:   Growth in aerobic bottle: Staphylococcus haemolyticus   Coag Negative Staphylococcus   Single set isolate, possible contaminant. Contact   Microbiology if susceptibility testing clinically   indicated.   ***Blood Panel PCR results on this specimen are available   approximately 3 hours after the Gram stain result.***   Gram stain, PCR, and/or culture results may not always   correspond due to difference in methodologies.   ************************************************************   This PCR assay was performed by multiplex PCR. This   Assay tests for 66 bacterial and resistance gene targets.   Please refer to the [facility] test directory   at [email or web address] for details.   Organism Identification: Blood Culture PCR   Method Type: PCR         RADIOLOGY & ADDITIONAL STUDIES:    < from: CT Angio Chest PE Protocol w/ IV Cont (08.08.21 @ 13:23) >    EXAM:  CT ANGIO CHEST PULM [facility]                            PROCEDURE DATE:  08/08/2021          INTERPRETATION:  CLINICAL INFORMATION: 61-year-old man with shortness of breath and substernal chest pain. History of CABG and smoking.    COMPARISON: None.    CONTRAST/COMPLICATIONS:  IV Contrast: Omnipaque 350  50 cc administered   50 cc discarded  Oral Contrast: NONE  Complications: None reported at time of study completion    PROCEDURE:  CT Angiography of the Chest.  Sagittal and coronal reformats were performed as well as 3D (MIP) reconstructions.    FINDINGS:    LUNGS AND AIRWAYS: Patent central airways.  Moderately severe emphysema. Moderately extensive right lower lobe atelectasis. Subsegmental left lower lobe atelectasis. Interlobular septal thickening which may be secondary to pulmonary interstitial edema  PLEURA: Small bilateral pleural effusions.  MEDIASTINUM AND TREY: AP window lymph node 19 x 11 mm. Several additional small lymph nodes.  VESSELS: No pulmonary embolus. Main pulmonary artery dilated to 3.6 cm suggesting pulmonary arterial hypertension.  HEART: Heart size is borderline. No pericardial effusion. Coronary artery calcification.  CHEST WALL AND LOWER NECK: Within normal limits.  VISUALIZED UPPER ABDOMEN: 12 mmirregular hypodensity posterior liver dome.  BONES: Within normal limits.    IMPRESSION:  No pulmonary embolus.  Moderately severe emphysema.  Enlarged main pulmonary artery suggesting pulmonary arterial hypertension.  Appearance suggesting pulmonaryinterstitial edema    < end of copied text >      < from: Xray Chest 1 View- PORTABLE-Urgent (08.08.21 @ 06:37) >  FINDINGS/  IMPRESSION:  Sternotomy wires noted.    Increased interstitial lung markings with nonspecific superimposed mild airspace opacities right lung base. No pleural effusion    Heart size cannot be accurately assessed in this projection, but appear enlarged.    < end of copied text >    
Nicolas Price MD  Interventional Cardiology / Advance Heart Failure and Cardiac Transplant Specialist  [place] Office : [address] [place] [address] [place]. [address]  Tel:   [place] Office : [address] [facility] [place] [address]  Tel: [phone]  Cell : [phone]    Subjective/Overnight events: Pt is lying in bed comfortable not in distress, no chest pains no SOB no palpitations  	  MEDICATIONS:  amLODIPine   Tablet 10 milliGRAM(s) Oral daily  aspirin enteric coated 81 milliGRAM(s) Oral daily  clopidogrel Tablet 75 milliGRAM(s) Oral daily  furosemide   Injectable 20 milliGRAM(s) IV Push every 12 hours  heparin   Injectable 5000 Unit(s) SubCutaneous every 12 hours  isosorbide   mononitrate ER Tablet (IMDUR) 60 milliGRAM(s) Oral daily  lisinopril 40 milliGRAM(s) Oral daily  metoprolol succinate  milliGRAM(s) Oral daily    azithromycin  IVPB 500 milliGRAM(s) IV Intermittent every 24 hours    albuterol/ipratropium for Nebulization 3 milliLiter(s) Nebulizer every 6 hours PRN  budesonide  80 MICROgram(s)/formoterol 4.5 MICROgram(s) Inhaler 2 Puff(s) Inhalation two times a day      pantoprazole    Tablet 40 milliGRAM(s) Oral before breakfast    atorvastatin 40 milliGRAM(s) Oral at bedtime  dextrose 40% Gel 15 Gram(s) Oral once  dextrose 50% Injectable 25 Gram(s) IV Push once  dextrose 50% Injectable 12.5 Gram(s) IV Push once  dextrose 50% Injectable 25 Gram(s) IV Push once  glucagon  Injectable 1 milliGRAM(s) IntraMuscular once  insulin glargine Injectable (LANTUS) 20 Unit(s) SubCutaneous at bedtime  insulin lispro (ADMELOG) corrective regimen sliding scale   SubCutaneous three times a day before meals  insulin lispro (ADMELOG) corrective regimen sliding scale   SubCutaneous at bedtime  insulin lispro Injectable (ADMELOG) 10 Unit(s) SubCutaneous three times a day before meals    dextrose 5%. 1000 milliLiter(s) IV Continuous <Continuous>  dextrose 5%. 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 ago    S/P CABG x 3   with Dr Oshea    S/P peripheral artery angioplasty with stent placement        SOCIAL HISTORY: Substance Use (street drugs): ( x ) never used  (  ) other:    FAMILY HISTORY:  Family history of acute myocardial infarction  father  massive MI at 50yo        REVIEW OF SYSTEMS:  CONSTITUTIONAL: No fever, weight loss, or fatigue  EYES: No eye pain, visual disturbances, or discharge  ENMT:  No difficulty hearing, tinnitus, vertigo; No sinus or throat pain  BREASTS: No pain, masses, or nipple discharge  GASTROINTESTINAL: No abdominal or epigastric pain. No nausea, vomiting, or hematemesis; No diarrhea or constipation. No melena or hematochezia.  GENITOURINARY: No dysuria, frequency, hematuria, or incontinence  NEUROLOGICAL: No headaches, memory loss, loss of strength, numbness, or tremors  ENDOCRINE: No heat or cold intolerance; No hair loss  MUSCULOSKELETAL: No joint pain or swelling; No muscle, back, or extremity pain  PSYCHIATRIC: No depression, anxiety, mood swings, or difficulty sleeping  HEME/LYMPH: No easy bruising, or bleeding gums  All others negative    PHYSICAL EXAM:  T(C): 36.7 (21 @ 11:32), Max: 36.9 (21 @ 05:51)  HR: 75 (21 @ 11:32) (63 - 80)  BP: 139/70 (21 @ 11:32) (115/58 - 139/70)  RR: 16 (21 @ 11:32) (16 - 16)  SpO2: 95% (21 @ 11:32) (93% - 100%)  Wt(kg): --  I&O's Summary    11 Aug 2021 07:01  -  12 Aug 2021 07:00  --------------------------------------------------------  IN: 150 mL / OUT: 300 mL / NET: -150 mL    12 Aug 2021 07:01  -  12 Aug 2021 14:51  --------------------------------------------------------  IN: 690 mL / OUT: 840 mL / NET: -150 mL      Height (cm): 177.8 ( @ 16:21)  Weight (kg): 83.6 ( @ 16:21)  BMI (kg/m2): 26.4 ( @ :21)  BSA (m2): 2.02 ( @ :)    GENERAL: NAD  EYES: EOMI, PERRLA, conjunctiva and sclera clear  ENMT: No tonsillar erythema, exudates, or enlargement  Cardiovascular: Normal S1 S2, No JVD, No murmurs, No edema  Respiratory: Lungs clear to auscultation	  Gastrointestinal:  Soft, Non-tender, + BS	  Extremities: No edema                                      12.5   12.96 )-----------( 229      ( 12 Aug 2021 07:23 )             36.6     08-12    140  |  102  |  24<H>  ----------------------------<  179<H>  3.7   |  22  |  0.88    Ca    8.9      12 Aug 2021 07:23  Phos  4.9       Mg     2.20           proBNP:   Lipid Profile:   HgA1c:   TSH:     Consultant(s) Notes Reviewed:  [x ] YES  [ ] NO    Care Discussed with Consultants/Other Providers [ x] YES  [ ] NO    Imaging Personally Reviewed independently:  [x] YES  [ ] NO    All labs, radiologic studies, vitals, orders and medications list reviewed. Patient is seen and examined at bedside. Case discussed with medical team.        
HPI:  62 y/o M w/ PMH of HTN, HLD, DM1, CAD s/p CABG + stents, PAD s/p stents, who presented to [facility] for chest pain. Patient initially treated for COPD exacerbation with steroids. However he was noted to have elevated troponin and was transferred to [facility] for cardiac catheterization which he underwent 8/11/21. Patient noted to have hyperglycemia 2/2 to receiving steroids. Endocrine consulted for further management.     Interval History:  Patient noted to have low BG this morning 61-- reported increased fatigue this morning.  Hypoglycemia currently resolved. Patient tolerating PO.  No n/v/d, abdominal pain.  Patient will be discharged today per primary team.    MEDICATIONS  (STANDING):  amLODIPine   Tablet 10 milliGRAM(s) Oral daily  aspirin enteric coated 81 milliGRAM(s) Oral daily  atorvastatin 40 milliGRAM(s) Oral at bedtime  budesonide  80 MICROgram(s)/formoterol 4.5 MICROgram(s) Inhaler 2 Puff(s) Inhalation two times a day  clopidogrel Tablet 75 milliGRAM(s) Oral daily  dextrose 40% Gel 15 Gram(s) Oral once  dextrose 5%. 1000 milliLiter(s) (50 mL/Hr) IV Continuous <Continuous>  dextrose 5%. 1000 milliLiter(s) (100 mL/Hr) IV Continuous <Continuous>  dextrose 50% Injectable 25 Gram(s) IV Push once  dextrose 50% Injectable 12.5 Gram(s) IV Push once  dextrose 50% Injectable 25 Gram(s) IV Push once  furosemide    Tablet 20 milliGRAM(s) Oral two times a day  glucagon  Injectable 1 milliGRAM(s) IntraMuscular once  heparin   Injectable 5000 Unit(s) SubCutaneous every 12 hours  insulin glargine Injectable (LANTUS) 18 Unit(s) SubCutaneous at bedtime  insulin lispro (ADMELOG) corrective regimen sliding scale   SubCutaneous three times a day before meals  insulin lispro (ADMELOG) corrective regimen sliding scale   SubCutaneous at bedtime  insulin lispro Injectable (ADMELOG) 8 Unit(s) SubCutaneous three times a day before meals  isosorbide   mononitrate ER Tablet (IMDUR) 60 milliGRAM(s) Oral daily  lisinopril 40 milliGRAM(s) Oral daily  metoprolol succinate  milliGRAM(s) Oral daily  nicotine -  14 mG/24Hr(s) Patch 1 Patch Transdermal daily  pantoprazole    Tablet 40 milliGRAM(s) Oral before breakfast  sodium chloride 0.9% lock flush 3 milliLiter(s) IV Push every 8 hours    MEDICATIONS  (PRN):  albuterol/ipratropium for Nebulization 3 milliLiter(s) Nebulizer every 6 hours PRN Shortness of Breath and/or Wheezing  nicotine  Polacrilex Gum 2 milliGRAM(s) Oral every 2 hours PRN breakthrough cravings    Physical exam  Vital Signs Last 24 Hrs  T(C): 36.9 (13 Aug 2021 12:30), Max: 36.9 (13 Aug 2021 05:24)  T(F): 98.4 (13 Aug 2021 12:30), Max: 98.5 (13 Aug 2021 05:24)  HR: 67 (13 Aug 2021 12:30) (65 - 70)  BP: 115/65 (13 Aug 2021 12:30) (108/60 - 130/69)  RR: 17 (13 Aug 2021 12:30) (17 - 18)  SpO2: 97% (13 Aug 2021 12:30) (95% - 97%)  General: NAD, well-developed  CV: normal rate, regular rhythm, + peripheral edema  Pulm: CTA in anterior lung fields, no wheezes, rales, rhonchi  Abd: +BS, soft and non-tender to palpation  Neuro: A&Ox3, no gross deficits appreciated  Psych: reactive affect, euthymic mood    CAPILLARY BLOOD GLUCOSE      POCT Blood Glucose.: 102 mg/dL (13 Aug 2021 11:48)  POCT Blood Glucose.: 70 mg/dL (13 Aug 2021 07:40)  POCT Blood Glucose.: 132 mg/dL (12 Aug 2021 21:03)    08-13    142  |  102  |  18  ----------------------------<  61<L>  3.3<L>   |  24  |  0.88    Ca    9.2      13 Aug 2021 07:16  Phos  4.3     08-13  Mg     2.00     08-13      HbA1c 7.7

## 2022-03-11 ENCOUNTER — APPOINTMENT (OUTPATIENT)
Dept: GASTROENTEROLOGY | Facility: CLINIC | Age: 62
End: 2022-03-11
Payer: MEDICARE

## 2022-03-11 VITALS
BODY MASS INDEX: 25.46 KG/M2 | SYSTOLIC BLOOD PRESSURE: 106 MMHG | OXYGEN SATURATION: 97 % | HEIGHT: 68 IN | TEMPERATURE: 97 F | HEART RATE: 56 BPM | DIASTOLIC BLOOD PRESSURE: 58 MMHG | WEIGHT: 168 LBS

## 2022-03-11 DIAGNOSIS — I25.2 OLD MYOCARDIAL INFARCTION: ICD-10-CM

## 2022-03-11 DIAGNOSIS — F17.210 NICOTINE DEPENDENCE, CIGARETTES, UNCOMPLICATED: ICD-10-CM

## 2022-03-11 DIAGNOSIS — Z87.898 PERSONAL HISTORY OF OTHER SPECIFIED CONDITIONS: ICD-10-CM

## 2022-03-11 DIAGNOSIS — K21.9 GASTRO-ESOPHAGEAL REFLUX DISEASE W/OUT ESOPHAGITIS: ICD-10-CM

## 2022-03-11 PROCEDURE — 99204 OFFICE O/P NEW MOD 45 MIN: CPT

## 2022-03-11 RX ORDER — PRAVASTATIN SODIUM 40 MG/1
40 TABLET ORAL
Refills: 0 | Status: ACTIVE | COMMUNITY

## 2022-03-11 RX ORDER — INSULIN HUMAN 100 [IU]/ML
(70-30) 100 INJECTION, SUSPENSION SUBCUTANEOUS
Refills: 0 | Status: ACTIVE | COMMUNITY

## 2022-03-11 RX ORDER — NIFEDIPINE 60 MG
60 TABLET, EXTENDED RELEASE ORAL
Refills: 0 | Status: ACTIVE | COMMUNITY

## 2022-03-11 RX ORDER — ISOSORBIDE MONONITRATE 30 MG
30 TABLET, EXTENDED RELEASE 24 HR ORAL
Refills: 0 | Status: ACTIVE | COMMUNITY

## 2022-03-11 RX ORDER — BUSPIRONE HYDROCHLORIDE 10 MG/1
10 TABLET ORAL
Refills: 0 | Status: ACTIVE | COMMUNITY

## 2022-03-11 RX ORDER — FUROSEMIDE 20 MG/1
20 TABLET ORAL
Refills: 0 | Status: ACTIVE | COMMUNITY

## 2022-03-11 RX ORDER — POLYETHYLENE GLYCOL 3350 AND ELECTROLYTES WITH LEMON FLAVOR 236; 22.74; 6.74; 5.86; 2.97 G/4L; G/4L; G/4L; G/4L; G/4L
236 POWDER, FOR SOLUTION ORAL
Qty: 1 | Refills: 0 | Status: ACTIVE | COMMUNITY
Start: 2022-03-11 | End: 1900-01-01

## 2022-03-11 RX ORDER — CLOPIDOGREL BISULFATE 75 MG/1
75 TABLET, FILM COATED ORAL
Refills: 0 | Status: ACTIVE | COMMUNITY

## 2022-03-11 RX ORDER — AMITRIPTYLINE HYDROCHLORIDE 10 MG/1
10 TABLET, FILM COATED ORAL
Refills: 0 | Status: ACTIVE | COMMUNITY

## 2022-03-11 RX ORDER — ERGOCALCIFEROL (VITAMIN D2) 1250 MCG
50000 CAPSULE ORAL
Refills: 0 | Status: ACTIVE | COMMUNITY

## 2022-03-11 RX ORDER — LISINOPRIL 40 MG/1
40 TABLET ORAL
Refills: 0 | Status: ACTIVE | COMMUNITY

## 2022-03-11 RX ORDER — ASPIRIN 81 MG
81 TABLET, DELAYED RELEASE (ENTERIC COATED) ORAL
Refills: 0 | Status: ACTIVE | COMMUNITY

## 2022-03-11 RX ORDER — METOPROLOL TARTRATE 100 MG/1
100 TABLET, FILM COATED ORAL
Refills: 0 | Status: ACTIVE | COMMUNITY

## 2022-03-11 NOTE — HISTORY OF PRESENT ILLNESS
[None] : had no significant interval events [Nausea] : denies nausea [Vomiting] : denies vomiting [Diarrhea] : denies diarrhea [Constipation] : denies constipation [Yellow Skin Or Eyes (Jaundice)] : denies jaundice [Abdominal Pain] : denies abdominal pain [Abdominal Swelling] : denies abdominal swelling [Rectal Pain] : denies rectal pain [GERD] : gastroesophageal reflux disease [Heartburn] : heartburn [Wt Gain ___ Lbs] : no recent weight gain [Wt Loss ___ Lbs] : no recent weight loss [Hiatus Hernia] : no hiatus hernia [Peptic Ulcer Disease] : no peptic ulcer disease [Pancreatitis] : no pancreatitis [Cholelithiasis] : no cholelithiasis [Kidney Stone] : no kidney stone [Inflammatory Bowel Disease] : no inflammatory bowel disease [Irritable Bowel Syndrome] : no irritable bowel syndrome [Diverticulitis] : no diverticulitis [Alcohol Abuse] : no alcohol abuse [Abdominal Surgery] : no abdominal surgery [Malignancy] : no malignancy [Appendectomy] : no appendectomy [Cholecystectomy] : no cholecystectomy [de-identified] : The patient is a 61-year-old male with past medical history significant for hypertension, hypercholesterolemia, diabetes mellitus and coronary artery disease, s/p CABG x 3 in 2012 and peripheral vascular disease requiring stent placement who was referred to my office by Dr. Trever Snider for colon cancer screening. The patient also admits to having occasional gastroesophageal reflux disease. I was asked to render an opinion for consultation for the above complaints.   The patient states that he is feeling fine.  The patient denies any abdominal pain.  The patient denies any abdominal gas and bloating.  The patient denies any nausea or vomiting.  The patient complains of occasional gastroesophageal reflux disease but denies any dysphagia. The gastroesophageal reflux disease is worse after meals and late at night.  The patient denies taking medications for the gastroesophageal reflux disease. The patient denies any atypical chest pain, shortness of breath or palpitations.  The patient denies any diaphoresis. The patient admits to occasional episodes of diaphoresis.  The patient complains of constipation but denies any diarrhea.  The patient has 1 bowel movement every 2 to 3 days. The patient complains of a change in bowel habits.  The patient complains of a change in caliber of stool.   The patient denies having mucus discharge with the bowel movements.  The patient denies any bright red blood per rectum, melena or hematemesis.  The patient denies any rectal pain or rectal pruritus. The patient denies any weight loss or anorexia.  He denies any fevers or chills.  The patient denies any jaundice or pruritus.  The patient denies any back pain.  The patient denies having a recent upper endoscopy and colonoscopy performed by another gastroenterologist.  The patient denies any significant family history of GI problems.   [de-identified] : (+) smoking 1 PPD x 48 years, (-) ETOH, (-) IVDA\par

## 2022-03-11 NOTE — REVIEW OF SYSTEMS
[Eyesight Problems] : eyesight problems [Heartburn] : heartburn [Nocturia] : nocturia [Anxiety] : anxiety [Depression] : depression [Easy Bruising] : a tendency for easy bruising [Negative] : Heme/Lymph

## 2022-03-15 LAB — HEMOCCULT STL QL: NEGATIVE

## 2022-06-17 NOTE — PATIENT PROFILE ADULT. - TEACHING/LEARNING LEARNING PREFERENCES
JORGE L Espinoza Addendum-----This patient was signed out to me pending labs and CT imaging.  Lab/imaging test results d/w ED attending Dr. Peguero who is also following this patient.  CT left upper extremity radiology report: ".....IMPRESSION:  Left upper extremity abscess closely associated with the deltoid musculature.  A couple foci of subcutaneous gas along the inferior margin of the collection are most likely related to recent upper extremity injection. Close clinical follow-up is recommended to exclude a necrotizing soft tissue infection.".  Pt rec'd cefazolin/vanco in ED.  Plan to admit to medicine for further management; pt was just d/c'd 6/16 from [facility] for similar process in RUE with I&D via IR.  Case d/w covering hospitalist Dr. Sahryn English who advised also ordering clindamycin dose IV; Dr. English accepted pt for admission to her service.
skill demonstration/verbal instruction/written material

## 2022-06-20 ENCOUNTER — RX RENEWAL (OUTPATIENT)
Age: 62
End: 2022-06-20

## 2022-06-22 ENCOUNTER — APPOINTMENT (OUTPATIENT)
Dept: VASCULAR SURGERY | Facility: CLINIC | Age: 62
End: 2022-06-22
Payer: MEDICARE

## 2022-06-22 ENCOUNTER — NON-APPOINTMENT (OUTPATIENT)
Age: 62
End: 2022-06-22

## 2022-06-22 VITALS
BODY MASS INDEX: 25.05 KG/M2 | TEMPERATURE: 97.3 F | WEIGHT: 175 LBS | HEART RATE: 55 BPM | SYSTOLIC BLOOD PRESSURE: 111 MMHG | DIASTOLIC BLOOD PRESSURE: 68 MMHG | HEIGHT: 70 IN

## 2022-06-22 VITALS — HEART RATE: 57 BPM | DIASTOLIC BLOOD PRESSURE: 67 MMHG | SYSTOLIC BLOOD PRESSURE: 110 MMHG

## 2022-06-22 DIAGNOSIS — Z86.39 PERSONAL HISTORY OF OTHER ENDOCRINE, NUTRITIONAL AND METABOLIC DISEASE: ICD-10-CM

## 2022-06-22 DIAGNOSIS — Z80.0 FAMILY HISTORY OF MALIGNANT NEOPLASM OF DIGESTIVE ORGANS: ICD-10-CM

## 2022-06-22 DIAGNOSIS — F17.200 NICOTINE DEPENDENCE, UNSPECIFIED, UNCOMPLICATED: ICD-10-CM

## 2022-06-22 DIAGNOSIS — Z82.49 FAMILY HISTORY OF ISCHEMIC HEART DISEASE AND OTHER DISEASES OF THE CIRCULATORY SYSTEM: ICD-10-CM

## 2022-06-22 PROCEDURE — 93880 EXTRACRANIAL BILAT STUDY: CPT

## 2022-06-22 PROCEDURE — 99203 OFFICE O/P NEW LOW 30 MIN: CPT

## 2022-06-22 RX ORDER — ISOSORBIDE MONONITRATE 60 MG/1
60 TABLET, EXTENDED RELEASE ORAL
Qty: 30 | Refills: 0 | Status: ACTIVE | COMMUNITY
Start: 2022-05-09

## 2022-06-22 RX ORDER — BLOOD SUGAR DIAGNOSTIC
STRIP MISCELLANEOUS
Qty: 100 | Refills: 0 | Status: ACTIVE | COMMUNITY
Start: 2021-10-07

## 2022-06-22 RX ORDER — INSULIN LISPRO 100 [IU]/ML
(75-25) 100 INJECTION, SUSPENSION SUBCUTANEOUS
Qty: 15 | Refills: 0 | Status: ACTIVE | COMMUNITY
Start: 2021-10-07

## 2022-06-22 RX ORDER — GLUCAGON INJECTION, SOLUTION 1 MG/.2ML
1 INJECTION, SOLUTION SUBCUTANEOUS
Qty: 1 | Refills: 0 | Status: ACTIVE | COMMUNITY
Start: 2022-06-14

## 2022-06-22 RX ORDER — AMLODIPINE BESYLATE 10 MG/1
10 TABLET ORAL
Qty: 30 | Refills: 0 | Status: ACTIVE | COMMUNITY
Start: 2022-03-02

## 2022-06-22 RX ORDER — DIAZEPAM 10 MG/1
10 TABLET ORAL
Qty: 2 | Refills: 0 | Status: ACTIVE | COMMUNITY
Start: 2022-05-13

## 2022-06-22 RX ORDER — METOPROLOL SUCCINATE 100 MG/1
100 TABLET, EXTENDED RELEASE ORAL
Qty: 30 | Refills: 0 | Status: ACTIVE | COMMUNITY
Start: 2022-03-01

## 2022-06-22 RX ORDER — ISOPROPYL ALCOHOL 0.75 G/1
SWAB TOPICAL
Qty: 100 | Refills: 0 | Status: ACTIVE | COMMUNITY
Start: 2022-06-14

## 2022-06-22 RX ORDER — INSULIN GLARGINE 300 U/ML
300 INJECTION, SOLUTION SUBCUTANEOUS
Qty: 4 | Refills: 0 | Status: ACTIVE | COMMUNITY
Start: 2022-06-14

## 2022-06-22 RX ORDER — BLOOD-GLUCOSE METER
32G X 4 MM EACH MISCELLANEOUS
Qty: 100 | Refills: 0 | Status: ACTIVE | COMMUNITY
Start: 2022-06-14

## 2022-06-22 RX ORDER — NIFEDIPINE 30 MG/1
30 TABLET, FILM COATED, EXTENDED RELEASE ORAL
Qty: 30 | Refills: 0 | Status: ACTIVE | COMMUNITY
Start: 2022-03-01

## 2022-06-22 RX ORDER — NIFEDIPINE 30 MG/1
30 TABLET, EXTENDED RELEASE ORAL
Qty: 30 | Refills: 0 | Status: ACTIVE | COMMUNITY
Start: 2022-03-01

## 2022-06-22 NOTE — ASSESSMENT
[FreeTextEntry1] : Problem #1 carotid artery stenosis\par - <50% ICA stenosis bilaterally\par - continue ASA and statin\par - follow up in one year for surveillance duplex

## 2022-06-22 NOTE — HISTORY OF PRESENT ILLNESS
[FreeTextEntry1] : 61 year old male who presents for evaluation for bilateral carotid artery stenosis:\par \par Referral: Dr. Nunn\par \par Past medical history of CAD, HTN, HLD, and GERD without history of TIA, stroke, or amaurosis fugax. Does state that a recent MRI demonstrated small infarcts of indeterminate age. Also states that recently his balance and gait have been off. He denies any upper or lower extremity weakness. He denies chest pain, dizziness, or headache.

## 2022-06-22 NOTE — PHYSICAL EXAM
[Respiratory Effort] : normal respiratory effort [Normal Rate and Rhythm] : normal rate and rhythm [2+] : left 2+ [Abdomen Tenderness] : ~T ~M No abdominal tenderness [Skin Ulcer] : no ulcer [Alert] : alert [Oriented to Person] : oriented to person [Oriented to Place] : oriented to place [Oriented to Time] : oriented to time [Calm] : calm [de-identified] : appears well [de-identified] : normocephalic, atraumatic [de-identified] : supple

## 2022-08-08 ENCOUNTER — APPOINTMENT (OUTPATIENT)
Dept: GASTROENTEROLOGY | Facility: CLINIC | Age: 62
End: 2022-08-08

## 2022-08-08 VITALS
DIASTOLIC BLOOD PRESSURE: 51 MMHG | BODY MASS INDEX: 24.34 KG/M2 | SYSTOLIC BLOOD PRESSURE: 92 MMHG | HEIGHT: 70 IN | TEMPERATURE: 98.2 F | HEART RATE: 63 BPM | WEIGHT: 170 LBS | OXYGEN SATURATION: 96 %

## 2022-08-08 DIAGNOSIS — K59.00 CONSTIPATION, UNSPECIFIED: ICD-10-CM

## 2022-08-08 PROCEDURE — 99213 OFFICE O/P EST LOW 20 MIN: CPT

## 2022-08-08 RX ORDER — POLYETHYLENE GLYCOL 3350 AND ELECTROLYTES WITH LEMON FLAVOR 236; 22.74; 6.74; 5.86; 2.97 G/4L; G/4L; G/4L; G/4L; G/4L
236 POWDER, FOR SOLUTION ORAL
Qty: 1 | Refills: 0 | Status: ACTIVE | COMMUNITY
Start: 2022-08-08 | End: 1900-01-01

## 2022-08-08 NOTE — HISTORY OF PRESENT ILLNESS
[None] : had no significant interval events [Heartburn] : denies heartburn [Nausea] : denies nausea [Vomiting] : denies vomiting [Diarrhea] : denies diarrhea [Yellow Skin Or Eyes (Jaundice)] : denies jaundice [Abdominal Pain] : denies abdominal pain [Abdominal Swelling] : denies abdominal swelling [Rectal Pain] : denies rectal pain [Constipation] : constipation [Wt Gain ___ Lbs] : no recent weight gain [Wt Loss ___ Lbs] : no recent weight loss [GERD] : no gastroesophageal reflux disease [Hiatus Hernia] : no hiatus hernia [Peptic Ulcer Disease] : no peptic ulcer disease [Pancreatitis] : no pancreatitis [Cholelithiasis] : no cholelithiasis [Kidney Stone] : no kidney stone [Inflammatory Bowel Disease] : no inflammatory bowel disease [Irritable Bowel Syndrome] : no irritable bowel syndrome [Diverticulitis] : no diverticulitis [Alcohol Abuse] : no alcohol abuse [Malignancy] : no malignancy [Abdominal Surgery] : no abdominal surgery [Appendectomy] : no appendectomy [Cholecystectomy] : no cholecystectomy [de-identified] : The patient has a history significant for hypertension, hypercholesterolemia, diabetes mellitus and coronary artery disease, s/p CABG x 3 in 2012 and peripheral vascular disease. The patient states that he is feeling fine. The patient denies any abdominal pain.  The patient denies any abdominal gas and bloating.  The patient denies any nausea or vomiting.  The patient denies any gastroesophageal reflux disease or dysphagia.  The patient denies any atypical chest pain, shortness of breath or palpitations.  The patient denies any diaphoresis. The patient complains of occasional constipation but denies any diarrhea.  The patient has 1 bowel movement every 2 to 3 days. The patient complains of a change in bowel habits.  The patient complains of a change in caliber of stool.   The patient denies having mucus discharge with the bowel movements.  The patient denies any bright red blood per rectum, melena or hematemesis.  The patient denies any rectal pain or rectal pruritus.  The patient denies any weight loss or anorexia.  He denies any fevers or chills.  The patient denies any significant family history of GI problems.  [de-identified] : (+) smoking 1 PPD x 48 years, (-) ETOH, (-) IVDA\par \par

## 2022-08-10 NOTE — H&P CARDIOLOGY - PSYCHIATRIC
[FreeTextEntry1] : 40 year old female presents for follow up of several endocrine issues.\par Had surgery December 2021 hysteroscopy found polyp, now has Mirena IUD placed. Now no longer having periods.\par Difficulty with weight loss, weight same as last visit. Tried various diets on/off. In the past did well with keto diet.\par History of fatty liver.\par Denies heat/cold intolerance, constipation, diarrhea, or palpitations. Some chronic fatigue, not worse.\par \par Thyroid nodules:  long standing history of multiple thyroid nodules and dominant left thyroid nodules that has been biopsied in 2005 and 2009 and reported as benign. Had ultrasound July 2021 stable size of nodule.\par The patient denies dysphagia, dyspnea or dysphonia. Prior uptake and scan in 2014 showing functional nodule but TFTs showed picture of secondary hypothyroidism and patient was clinically complaining of weight gain and appeared to be hypothyroid and not hyperthyroid. She was initiated on levothyroxine with subsequent dose adjustments.\par Current regimen of levothyroxine -  88 mcg daily\par She takes in AM on empty stomach but missed last 2 days (missplaced it)\par Recent thyroid US with Dr. Tolliver, stable one year follow up recommended\par \par Prior elevated IGF-1, glucose tolerance checked and was unrevealing.\par Chronic irregular periods, possible mild PCOS, didn't have issues getting pregnant.\par Noted with prediabetes several years ago.\par History of Vit D deficiency. Prescribed the ergocalciferol 50,000 weekly which she reports she is now taking.\par Previously had seen a functional medicine doctor prescribed supplements, not taking these now.\par \par Obesity/preDM - Ozempic not covered\par tried metformin 500 BID but stopped both metformin and dairy for GI side effects - not sure if metformin implicated by symptoms improved after stopped both.\par started seeing a nutritionist\par \par House renovations, lost levothyroxine missed doses last 2 days\par \par 
Affect and characteristics of appearance, verbalizations, behaviors are appropriate

## 2022-08-19 NOTE — H&P CARDIOLOGY - NEUROLOGICAL
Follow up urine dip unremarkable   Plan for repeat urine culture at next visit negative detailed exam

## 2022-09-06 NOTE — ED ADULT NURSE NOTE - NS ED NURSE LEVEL OF CONSCIOUSNESS MENTAL STATUS
Mylene, daughter of Kandace Erazo would like a call back to discuss if there would need to be any changes to medication due to some behavioral changes. Please call    Awake/Alert/Cooperative

## 2022-09-27 NOTE — PROGRESS NOTE ADULT - PROBLEM SELECTOR PROBLEM 3
Regimen: Venofer + B12    Dr. Gildardo Gomez is supervising provider today.    Nursing Assessment: A focused nursing assessment  was performed and the patient reports the following: Nausea: NO  Vomiting: NO  Fever: NO  Chills: NO  Other signs of infection: NO  Bleeding: NO  Mucositis: NO  Diarrhea: NO  Constipation: NO  Anorexia: NO  Dysuria: NO  Urinary Bleeding: NO  Cough: NO  Shortness of Breath: NO  Fatigue/Weakness: NO  Numbness/Tingling: NO  Other Neuropathies: NO  Edema: NO  Rash: NO  Hand/Foot Syndrome: NO  Anxiety/Depression/Insomnia: NO  Pain: NO    Pre-Treatment: - Patient has valid pre-authorization  - VS completed  - Height and weight verified  - Premed orders are verified prior to administration  - Treatment parameters verified in patient protocol  - Patient is identified by first & last name, Date of birth that has been verified with the patient chairside.    Treatment: Refer to [facility] and MAR for line assessment and medication administration, Blood return confirmed before, during and after treatment administered, Infusion pump used for non-vesicant drugs and SubQ/IM Injection: See injection record for documentation    Post Treatment: Treatment tolerated well; no adverse reaction    Education: No new instructions needed    Next appointment scheduled: 2 days  Patient instructed to call the office with any questions or concerns.    Patient Discharged: patient discharged to home per self, ambulatory    
Acute respiratory failure with hypoxia
Acute respiratory failure with hypoxia

## 2022-10-13 ENCOUNTER — APPOINTMENT (OUTPATIENT)
Dept: GASTROENTEROLOGY | Facility: HOSPITAL | Age: 62
End: 2022-10-13

## 2022-10-19 ENCOUNTER — RX RENEWAL (OUTPATIENT)
Age: 62
End: 2022-10-19

## 2022-11-07 ENCOUNTER — APPOINTMENT (OUTPATIENT)
Dept: GASTROENTEROLOGY | Facility: CLINIC | Age: 62
End: 2022-11-07

## 2022-11-07 VITALS
HEART RATE: 66 BPM | SYSTOLIC BLOOD PRESSURE: 120 MMHG | DIASTOLIC BLOOD PRESSURE: 61 MMHG | TEMPERATURE: 97.1 F | HEIGHT: 70 IN | BODY MASS INDEX: 25.05 KG/M2 | WEIGHT: 175 LBS | OXYGEN SATURATION: 97 %

## 2022-11-07 DIAGNOSIS — Z12.11 ENCOUNTER FOR SCREENING FOR MALIGNANT NEOPLASM OF COLON: ICD-10-CM

## 2022-11-07 DIAGNOSIS — Z01.818 ENCOUNTER FOR OTHER PREPROCEDURAL EXAMINATION: ICD-10-CM

## 2022-11-07 PROCEDURE — 99213 OFFICE O/P EST LOW 20 MIN: CPT

## 2022-11-07 NOTE — ASSESSMENT
[FreeTextEntry1] : Colon Cancer screening: I recommend colonoscopy for colon cancer screening over the age of 45 to assess for colonic polyps. The patient was told of the risks and benefits of the procedure. The patient was told of the risks of perforation, emergency surgery, bleeding, infections and missed lesions. The patient is to be on a clear liquid diet the entire day prior to the procedure. The patient is to complete the entire prescribed bowel prep the day prior to the procedure as directed. The patient is to have a COVID 19 PCR nasopharyngeal swab performed at the approved sites 3 days prior to the procedure. The patient is told not to drive, drink alcohol, use recreational drugs, exercise, or work the day of the procedure. The patient was told of the need for an escort to accompany the patient home after the procedure. The patient is aware that the procedure may be cancelled if they fail to follow the directions. The patient agreed and will schedule for the procedure. The patient can take the antihypertensive medication with a sip of water one hour prior to the procedure. The patient is to hold the diabetic medication the day before and the morning of the procedure. The patient is to hold the blood thinner medication for 5 days prior to the procedure. The patient is to be n.p.o. after midnight and bowel prep was given. The patient is to return for the procedure after cardiac clearance.\par Colonoscopy: I recommend a colonoscopy to assess the symptoms and for colonic polyps. The patient was told of the risks and benefits of the procedure. The patient was told of the risks of perforation, emergency surgery, bleeding, infections and missed lesions. The patient is to be on a clear liquid diet the entire day prior to the procedure. The patient is to complete the entire prescribed bowel prep the day prior to the procedure as directed. The patient is to have a COVID 19 PCR nasopharyngeal swab performed at the approved sites 3 days prior to the procedure. The patient is told not to drive, drink alcohol, use recreational drugs, exercise, or work the day of the procedure. The patient was told of the need for an escort to accompany the patient home after the procedure. The patient is aware that the procedure may be cancelled if they fail to follow the directions. The patient agreed and will schedule for the procedure. The patient can take the antihypertensive medication with a sip of water one hour prior to the procedure. The patient is to hold the diabetic medication the day before and the morning of the procedure. The patient is to hold the blood thinner medication for 5 days prior to the procedure. The patient is to be n.p.o. after midnight and bowel prep was given. The patient is to return for the procedure after cardiac clearance.\par Follow-up: The patient is to follow-up in the office in 4 weeks to reassess the symptoms. The patient was told to call the office if any further problems. \par \par Dr. Nicolas Price Cardiologist: [phone]  [address] [place] [address]\par

## 2022-11-07 NOTE — HISTORY OF PRESENT ILLNESS
[FreeTextEntry1] : The patient has a history significant for hypertension, hypercholesterolemia, diabetes mellitus and coronary artery disease, s/p CABG x 3 in 2012 and peripheral vascular disease. The patient states that he is feeling fine. The patient denies any jaundice or pruritus.  The patient denies any chronic lower back pain. The patient denies any abdominal pain.  The patient denies any abdominal gas and bloating.  The patient denies any nausea or vomiting.  The patient denies any gastroesophageal reflux disease or dysphagia.  The patient denies any atypical chest pain, shortness of breath or palpitations.  The patient denies any diaphoresis. The patient denies any constipation or diarrhea.  The patient has 1 bowel movement a day. The patient denies a change in bowel habits.  The patient denies a change in caliber of stool.  The patient denies having mucus discharge with the bowel movements.  The patient denies any bright red blood per rectum, melena or hematemesis.  The patient denies any rectal pain or rectal pruritus.  The patient denies any weight loss or anorexia. He denies any fevers or chills.  The patient had  an upper endoscopy  performed at the office on September 28, 2010.  The findings revealed mild diffuse bile gastritis.  The pathology revealed unremarkable gastric body and duodenal mucosa, gastric antral mucosa with few chronic inflammatory cells with no evidence of intestinal metaplasia or active gastritis that was negative for Helicobacter pylori and esophageal mucosal squamous epithelium with focal basal cell hyperplasia, few intraepithelial lymphocytes and eosinophilis suggestive of reflux esophagitis.  The patient tolerated the procedure well. The patient denies any significant family history of GI problems. \par \par (+) smoking 1 PPD x 48 years, (-) ETOH, (-) IVDA\par \par  [de-identified] : The upper endoscopy  performed at the office on September 28, 2010revealed mild diffuse bile gastritis.  The pathology revealed unremarkable gastric body and duodenal mucosa, gastric antral mucosa with few chronic inflammatory cells with no evidence of intestinal metaplasia or active gastritis that was negative for Helicobacter pylori and esophageal mucosal squamous epithelium with focal basal cell hyperplasia, few intraepithelial lymphocytes and eosinophilis suggestive of reflux esophagitis.  The patient tolerated the procedure well. The patient denies any significant family history of GI problems.

## 2022-11-29 ENCOUNTER — RX RENEWAL (OUTPATIENT)
Age: 62
End: 2022-11-29

## 2023-01-27 NOTE — H&P CARDIOLOGY - NS PRO CESSATION MED ACCEPTED
Headache    A headache is pain or discomfort felt around the head or neck area. The specific cause of a headache may not be found as there are many types including tension headaches, migraine headaches, and cluster headaches. Watch your condition for any changes. Things you can do to manage your pain include taking over the counter and prescription medications as instructed by your health care provider, lying down in a dark quiet room, limiting stress, getting regular sleep, and refraining from alcohol and tobacco products. If headache persists you must follow up with your pmd and or neurologist for futher tests.     SEEK IMMEDIATE MEDICAL CARE IF YOU HAVE ANY OF THE FOLLOWING SYMPTOMS: fever, vomiting, stiff neck, loss of vision, problems with speech, muscle weakness, loss of balance, trouble walking, passing out, or confusion.     Concussion, Adult  A concussion is a brain injury from a direct hit (blow) to the head or body. This blow causes the brain to shake quickly back and forth inside the skull. This can damage brain cells and cause chemical changes in the brain. A concussion may also be known as a mild traumatic brain injury (TBI).    ImageConcussions are usually not life-threatening, but the effects of a concussion can be serious. If you have a concussion, you are more likely to experience concussion-like symptoms after a direct blow to the head in the future.    What are the causes?  This condition is caused by:    A direct blow to the head, such as from running into another player during a game, being hit in a fight, or hitting your head on a hard surface.  A jolt of the head or neck that causes the brain to move back and forth inside the skull, such as in a car crash.    What are the signs or symptoms?  The signs of a concussion can be hard to notice. Early on, they may be missed by you, family members, and health care providers. You may look fine but act or feel differently.    Symptoms are usually temporary, but they may last for days, weeks, or even longer. Some symptoms may appear right away but other symptoms may not show up for hours or days. Every head injury is different. Symptoms may include:    Headaches. This can include a feeling of pressure in the head.  Memory problems.  Trouble concentrating, organizing, or making decisions.  Slowness in thinking, acting or reacting, speaking, or reading.  Confusion.  Fatigue.  Changes in eating or sleeping patterns.  Problems with coordination or balance.  Nausea or vomiting.  Numbness or tingling.  Sensitivity to light or noise.  Vision or hearing problems.  Reduced sense of smell.  Irritability or mood changes.  Dizziness.  Lack of motivation.  Seeing or hearing things that other people do not see or hear (hallucinations).    How is this diagnosed?  This condition is diagnosed based on:    Your symptoms.  A description of your injury.    You may also have tests, including:    Imaging tests, such as a CT scan or MRI. These are done to look for signs of brain injury.  Neuropsychological tests. These measure your thinking, understanding, learning, and remembering abilities.    How is this treated?  This condition is treated with physical and mental rest and careful observation, usually at home. If the concussion is severe, you may need to stay home from work for a while. You may be referred to a concussion clinic or to other health care providers for management. It is important that you tell your health care provider if:    You are taking any medicines, including prescription medicines, over-the-counter medicines, and natural remedies. Some medicines, such as blood thinners (anticoagulants) and aspirin, may increase the chance of complications, such as bleeding.  You are taking or have taken alcohol or illegal drugs. Alcohol and certain other drugs may slow your recovery and can put you at risk of further injury.    How fast you will recover from a concussion depends on many factors, such as how severe your concussion is, what part of your brain was injured, how old you are, and how healthy you were before the concussion. Recovery can take time. It is important to wait to return to activity until a health care provider says it is safe to do that and your symptoms are completely gone.    Follow these instructions at home:  Activity     Limit activities that require a lot of thought or concentration. These may include:    Doing homework or job-related work.  Watching TV.  Working on the computer.  Playing memory games and puzzles.    Rest. Rest helps the brain to heal. Make sure you:    Get plenty of sleep at night. Avoid staying up late at night.  Keep the same bedtime hours on weekends and weekdays.  Rest during the day. Take naps or rest breaks when you feel tired.    Having another concussion before the first one has healed can be dangerous. Do not do high-risk activities that could cause a second concussion, such as riding a bicycle or playing sports.  Ask your health care provider when you can return to your normal activities, such as school, work, athletics, driving, riding a bicycle, or using heavy machinery. Your ability to react may be slower after a brain injury. Never do these activities if you are dizzy. Your health care provider will likely give you a plan for gradually returning to activities.  General instructions     Take over-the-counter and prescription medicines only as told by your health care provider.  Do not drink alcohol until your health care provider says you can.  If it is harder than usual to remember things, write them down.  If you are easily distracted, try to do one thing at a time. For example, do not try to watch TV while fixing dinner.  Talk with family members or close friends when making important decisions.  Watch your symptoms and tell others to do the same. Complications sometimes occur after a concussion. Older adults with a brain injury may have a higher risk of serious complications, such as a blood clot in the brain.  Tell your teachers, school nurse, school counselor, , , or  about your injury, symptoms, and restrictions. Tell them about what you can or cannot do. They should watch for:    Increased problems with attention or concentration.  Increased difficulty remembering or learning new information.  Increased time needed to complete tasks or assignments.  Increased irritability or decreased ability to cope with stress.  Increased symptoms.    Keep all follow-up visits as told by your health care provider. This is important.  How is this prevented?  It is very important to avoid another brain injury, especially as you recover. In rare cases, another injury can lead to permanent brain damage, brain swelling, or death. The risk of this is greatest during the first 7–10 days after a head injury. Avoid injuries by:    Wearing a seat belt when riding in a car.  Wearing a helmet when biking, skiing, skateboarding, skating, or doing similar activities.  Avoiding activities that could lead to a second concussion, such as contact or recreational sports, until your health care provider says it is okay.  Taking safety measures in your home, such as:    Removing clutter and tripping hazards from floors and stairways.  Using grab bars in bathrooms and handrails by stairs.  Placing non-slip mats on floors and in bathtubs.  Improving lighting in dim areas.      Contact a health care provider if:  Your symptoms get worse.  You have new symptoms.  You continue to have symptoms for more than 2 weeks.  Get help right away if:  You have severe or worsening headaches.  You have weakness or numbness in any part of your body.  Your coordination gets worse.  You vomit repeatedly.  You are sleepier.  The pupil of one eye is larger than the other.  You have convulsions or a seizure.  Your speech is slurred.  Your fatigue, confusion, or irritability gets worse.  You cannot recognize people or places.  You have neck pain.  It is difficult to wake you up.  You have unusual behavior changes.  You lose consciousness.  Summary  A concussion is a brain injury from a direct hit (blow) to the head or body.  A concussion may also be called a mild traumatic brain injury (TBI).  You may have imaging tests and neuropsychological tests to diagnose a concussion.  This condition is treated with physical and mental rest and careful observation.  Ask your health care provider when you can return to your normal activities, such as school, work, athletics, driving, riding a bicycle, or using heavy machinery. Follow safety instructions as told by your health care provider.  This information is not intended to replace advice given to you by your health care provider. Make sure you discuss any questions you have with your health care provider. order written

## 2023-02-16 ENCOUNTER — APPOINTMENT (OUTPATIENT)
Dept: GASTROENTEROLOGY | Facility: HOSPITAL | Age: 63
End: 2023-02-16

## 2023-03-20 ENCOUNTER — RX RENEWAL (OUTPATIENT)
Age: 63
End: 2023-03-20

## 2023-04-28 ENCOUNTER — RX RENEWAL (OUTPATIENT)
Age: 63
End: 2023-04-28

## 2023-04-28 RX ORDER — FAMOTIDINE 40 MG/1
40 TABLET, FILM COATED ORAL
Qty: 30 | Refills: 0 | Status: ACTIVE | COMMUNITY
Start: 2022-03-11 | End: 1900-01-01

## 2023-05-17 ENCOUNTER — RX RENEWAL (OUTPATIENT)
Age: 63
End: 2023-05-17

## 2023-07-19 ENCOUNTER — APPOINTMENT (OUTPATIENT)
Dept: VASCULAR SURGERY | Facility: CLINIC | Age: 63
End: 2023-07-19

## 2023-11-01 NOTE — ASSESSMENT
[FreeTextEntry1] : Constipation: The patient complains of constipation. I recommend a high-fiber diet. I recommend a trial of a probiotic such as Align once a day. I recommend a trial of Metamucil once a day for fiber supplementation. I recommend a trial of Miralax 1 packet once a day for the constipation.   The patient agreed and will followup to reassess the symptoms.  \par Colon Cancer screening: I recommend colonoscopy for colon cancer screening over the age of 45 to assess for colonic polyps. The patient was told of the risks and benefits of the procedure. The patient was told of the risks of perforation, emergency surgery, bleeding, infections and missed lesions. The patient is to be on a clear liquid diet the entire day prior to the procedure. The patient is to complete the entire prescribed bowel prep the day prior to the procedure as directed. The patient is to have a COVID 19 PCR nasopharyngeal swab performed at the approved sites 3 days prior to the procedure. The patient is told not to drive, drink alcohol, use recreational drugs, exercise, or work the day of the procedure. The patient was told of the need for an escort to accompany the patient home after the procedure. The patient is aware that the procedure may be cancelled if they fail to follow the directions. The patient agreed and will schedule for the procedure. The patient can take the antihypertensive medication with a sip of water one hour prior to the procedure. The patient is to hold the diabetic medication the day before and the morning of the procedure. The patient is to hold the blood thinner medication for 7 days prior to the procedure. The patient is to be n.p.o. after midnight and bowel prep was given. The patient is to return for the procedure.\par Colonoscopy: I recommend a colonoscopy to assess the symptoms and for colonic polyps. The patient was told of the risks and benefits of the procedure. The patient was told of the risks of perforation, emergency surgery, bleeding, infections and missed lesions. The patient is to be on a clear liquid diet the entire day prior to the procedure. The patient is to complete the entire prescribed bowel prep the day prior to the procedure as directed. The patient is to have a COVID 19 PCR nasopharyngeal swab performed at the approved sites 3 days prior to the procedure. The patient is told not to drive, drink alcohol, use recreational drugs, exercise, or work the day of the procedure. The patient was told of the need for an escort to accompany the patient home after the procedure. The patient is aware that the procedure may be cancelled if they fail to follow the directions. The patient agreed and will schedule for the procedure. The patient can take the antihypertensive medication with a sip of water one hour prior to the procedure. The patient is to hold the diabetic medication the day before and the morning of the procedure. The patient is to hold the blood thinner medication for 7 days prior to the procedure. The patient is to be n.p.o. after midnight and bowel prep was given. The patient is to return for the procedure after cardiac clearance.\par Follow-up: The patient is to follow-up in the office in 4 weeks to reassess the symptoms. The patient was told to call the office if any further problems. \par \par 
Attending Attestation (For Attendings USE Only)...

## 2024-10-01 ENCOUNTER — APPOINTMENT (OUTPATIENT)
Dept: VASCULAR SURGERY | Facility: CLINIC | Age: 64
End: 2024-10-01
Payer: MEDICARE

## 2024-10-01 VITALS
SYSTOLIC BLOOD PRESSURE: 150 MMHG | HEART RATE: 53 BPM | HEIGHT: 70 IN | TEMPERATURE: 98 F | BODY MASS INDEX: 23.62 KG/M2 | DIASTOLIC BLOOD PRESSURE: 63 MMHG | WEIGHT: 165 LBS

## 2024-10-01 DIAGNOSIS — I65.23 OCCLUSION AND STENOSIS OF BILATERAL CAROTID ARTERIES: ICD-10-CM

## 2024-10-01 PROCEDURE — 99213 OFFICE O/P EST LOW 20 MIN: CPT

## 2024-10-01 PROCEDURE — 93880 EXTRACRANIAL BILAT STUDY: CPT

## 2024-10-01 RX ORDER — DAPAGLIFLOZIN 10 MG/1
10 TABLET, FILM COATED ORAL
Refills: 0 | Status: ACTIVE | COMMUNITY

## 2024-10-01 RX ORDER — HYDRALAZINE HYDROCHLORIDE 25 MG/1
25 TABLET ORAL
Refills: 0 | Status: ACTIVE | COMMUNITY

## 2024-10-08 PROBLEM — I65.23 BILATERAL CAROTID ARTERY STENOSIS: Status: ACTIVE | Noted: 2024-10-08

## 2024-10-08 NOTE — HISTORY OF PRESENT ILLNESS
[FreeTextEntry1] : 61 year old male who presents for evaluation for bilateral carotid artery stenosis:\par  \par  Referral: Dr. Nunn\par  \par  Past medical history of CAD, HTN, HLD, and GERD without history of TIA, stroke, or amaurosis fugax. Does state that a recent MRI demonstrated small infarcts of indeterminate age. Also states that recently his balance and gait have been off. He denies any upper or lower extremity weakness. He denies chest pain, dizziness, or headache. [de-identified] : Denies any new problems.

## 2024-10-08 NOTE — PHYSICAL EXAM
[Respiratory Effort] : normal respiratory effort [Normal Rate and Rhythm] : normal rate and rhythm [2+] : left 2+ [Abdomen Tenderness] : ~T ~M No abdominal tenderness [Skin Ulcer] : no ulcer [Alert] : alert [Oriented to Person] : oriented to person [Oriented to Place] : oriented to place [Oriented to Time] : oriented to time [Calm] : calm [de-identified] : appears well [de-identified] : normocephalic, atraumatic [de-identified] : supple

## 2024-10-08 NOTE — ASSESSMENT
[FreeTextEntry1] : Problem #1 bilateral carotid artery stenosis <50% ICA stenosis bilaterally asymptomatic continue best medical therapy follow up in 6 months with interval duplex

## 2024-10-08 NOTE — PHYSICAL EXAM
[Respiratory Effort] : normal respiratory effort [Normal Rate and Rhythm] : normal rate and rhythm [2+] : left 2+ [Abdomen Tenderness] : ~T ~M No abdominal tenderness [Skin Ulcer] : no ulcer [Alert] : alert [Oriented to Person] : oriented to person [Oriented to Place] : oriented to place [Oriented to Time] : oriented to time [Calm] : calm [de-identified] : appears well [de-identified] : normocephalic, atraumatic [de-identified] : supple

## 2024-10-08 NOTE — HISTORY OF PRESENT ILLNESS
[FreeTextEntry1] : 61 year old male who presents for evaluation for bilateral carotid artery stenosis:\par  \par  Referral: Dr. Nunn\par  \par  Past medical history of CAD, HTN, HLD, and GERD without history of TIA, stroke, or amaurosis fugax. Does state that a recent MRI demonstrated small infarcts of indeterminate age. Also states that recently his balance and gait have been off. He denies any upper or lower extremity weakness. He denies chest pain, dizziness, or headache. [de-identified] : Denies any new problems.

## 2025-01-08 NOTE — PATIENT PROFILE ADULT. - AS SC BRADEN FRICTION
Duration Of Freeze Thaw-Cycle (Seconds): 8 Show Applicator Variable?: Yes Post-Care Instructions: I reviewed with the patient in detail post-care instructions. Patient may apply Vaseline or Aquaphor to crusted or scabbing areas.\\nPatient to return to clinic for evaluation if lesion is persistent after 6 weeks. Detail Level: Detailed Number Of Freeze-Thaw Cycles: 1 freeze-thaw cycle Render Note In Bullet Format When Appropriate: No Consent: The patient's verbal consent was obtained including but not limited to risks of crusting, scabbing, blistering, scarring, darker or lighter pigmentary change, recurrence, incomplete removal and infection. (3) no apparent problem

## 2025-04-01 ENCOUNTER — APPOINTMENT (OUTPATIENT)
Dept: VASCULAR SURGERY | Facility: CLINIC | Age: 65
End: 2025-04-01

## 2025-05-22 NOTE — H&P CARDIOLOGY - RS GEN PE MLT RESP DETAILS PC
Occupational Therapy    [facility]  Occupational Therapy Not Seen Note    DATE: 2025    NAME: Mary Bills  MRN: [id number]   : 1965      Patient not seen this date for Occupational Therapy due to:    Surgery/Procedure: Plan for heart cath today. OT will continue to follow and check back when appropriate.      Electronically signed by CATHLEEN Corona on 2025 at 9:51 AM    good air movement/clear to auscultation bilaterally/breath sounds equal/no chest wall tenderness/airway patent/respirations non-labored